# Patient Record
Sex: MALE | Race: WHITE | NOT HISPANIC OR LATINO | Employment: UNEMPLOYED | URBAN - METROPOLITAN AREA
[De-identification: names, ages, dates, MRNs, and addresses within clinical notes are randomized per-mention and may not be internally consistent; named-entity substitution may affect disease eponyms.]

---

## 2020-11-17 ENCOUNTER — HOSPITAL ENCOUNTER (OUTPATIENT)
Dept: RADIOLOGY | Facility: HOSPITAL | Age: 55
Discharge: HOME | End: 2020-11-17
Attending: FAMILY MEDICINE
Payer: MEDICAID

## 2020-11-17 ENCOUNTER — OFFICE VISIT (OUTPATIENT)
Dept: FAMILY MEDICINE | Facility: CLINIC | Age: 55
End: 2020-11-17
Payer: MEDICAID

## 2020-11-17 VITALS
SYSTOLIC BLOOD PRESSURE: 112 MMHG | HEIGHT: 71 IN | OXYGEN SATURATION: 97 % | WEIGHT: 315 LBS | HEART RATE: 80 BPM | BODY MASS INDEX: 44.1 KG/M2 | DIASTOLIC BLOOD PRESSURE: 70 MMHG | TEMPERATURE: 97 F

## 2020-11-17 DIAGNOSIS — M54.50 CHRONIC BILATERAL LOW BACK PAIN WITHOUT SCIATICA: Primary | ICD-10-CM

## 2020-11-17 DIAGNOSIS — Z72.0 CHEWS TOBACCO REGULARLY: ICD-10-CM

## 2020-11-17 DIAGNOSIS — G89.29 CHRONIC BILATERAL LOW BACK PAIN WITHOUT SCIATICA: Primary | ICD-10-CM

## 2020-11-17 DIAGNOSIS — M48.062 NEUROGENIC CLAUDICATION DUE TO LUMBAR SPINAL STENOSIS: ICD-10-CM

## 2020-11-17 DIAGNOSIS — Z12.11 COLON CANCER SCREENING: ICD-10-CM

## 2020-11-17 DIAGNOSIS — E66.01 MORBID OBESITY WITH BMI OF 50.0-59.9, ADULT: ICD-10-CM

## 2020-11-17 DIAGNOSIS — I10 ESSENTIAL HYPERTENSION: ICD-10-CM

## 2020-11-17 DIAGNOSIS — J32.9 CHRONIC CONGESTION OF PARANASAL SINUS: ICD-10-CM

## 2020-11-17 DIAGNOSIS — M48.07 SPINAL STENOSIS, LUMBOSACRAL REGION: ICD-10-CM

## 2020-11-17 DIAGNOSIS — M54.9 DORSALGIA, UNSPECIFIED: ICD-10-CM

## 2020-11-17 PROCEDURE — 99204 OFFICE O/P NEW MOD 45 MIN: CPT | Mod: PBBFAC,PO | Performed by: FAMILY MEDICINE

## 2020-11-17 PROCEDURE — 99999 PR PBB SHADOW E&M-NEW PATIENT-LVL IV: CPT | Mod: PBBFAC,,, | Performed by: FAMILY MEDICINE

## 2020-11-17 PROCEDURE — 72100 X-RAY EXAM L-S SPINE 2/3 VWS: CPT | Mod: TC,FY,PO

## 2020-11-17 PROCEDURE — 99999 PR PBB SHADOW E&M-NEW PATIENT-LVL IV: ICD-10-PCS | Mod: PBBFAC,,, | Performed by: FAMILY MEDICINE

## 2020-11-17 PROCEDURE — 72100 XR LUMBAR SPINE AP AND LATERAL: ICD-10-PCS | Mod: 26,,, | Performed by: RADIOLOGY

## 2020-11-17 PROCEDURE — 99204 PR OFFICE/OUTPT VISIT, NEW, LEVL IV, 45-59 MIN: ICD-10-PCS | Mod: S$PBB,,, | Performed by: FAMILY MEDICINE

## 2020-11-17 PROCEDURE — 72100 X-RAY EXAM L-S SPINE 2/3 VWS: CPT | Mod: 26,,, | Performed by: RADIOLOGY

## 2020-11-17 PROCEDURE — 99204 OFFICE O/P NEW MOD 45 MIN: CPT | Mod: S$PBB,,, | Performed by: FAMILY MEDICINE

## 2020-11-17 RX ORDER — LOSARTAN POTASSIUM AND HYDROCHLOROTHIAZIDE 12.5; 5 MG/1; MG/1
1 TABLET ORAL DAILY
COMMUNITY
End: 2021-05-19 | Stop reason: SDUPTHER

## 2020-11-17 RX ORDER — CARVEDILOL 25 MG/1
25 TABLET ORAL 2 TIMES DAILY
COMMUNITY
Start: 2020-09-10 | End: 2021-11-12 | Stop reason: SDUPTHER

## 2020-11-17 RX ORDER — BUPRENORPHINE HYDROCHLORIDE, NALOXONE HYDROCHLORIDE 12; 3 MG/1; MG/1
0.5 FILM, SOLUBLE BUCCAL; SUBLINGUAL 3 TIMES DAILY
COMMUNITY
Start: 2020-09-02

## 2020-11-17 NOTE — PROGRESS NOTES
Chief Complaint:    Chief Complaint   Patient presents with    [facility]       History of Present Illness:  Patient is new to the practice  He says he suffers with chronic back pain for a number years he describes the pain as in the low back and slight activity for example washing dishes or lawn mowing be backing or slight walking causes severe pain in the back and his legs get weak feels like he is about to fall down and gives out any strength in his legs.  Denies any tingling or numbness he describes the pain as does radiating to the legs specially with exertion.  He has not had any testing done other than just x-ray and has been sent for physical therapy at least few times which he says has not done him any good.  He says he is unable to work like this he that he wants his back fixed or he wants to go on disability.    He is also being treated for hypertension  Never had a colonoscopy      ROS:  Review of Systems   Constitutional: Negative for activity change, chills, fatigue, fever and unexpected weight change.   HENT: Positive for congestion. Negative for ear discharge, ear pain, hearing loss, postnasal drip and rhinorrhea.    Eyes: Negative for pain and visual disturbance.   Respiratory: Negative for cough, chest tightness and shortness of breath.    Cardiovascular: Negative for chest pain and palpitations.   Gastrointestinal: Negative for abdominal pain, diarrhea and vomiting.   Endocrine: Negative for heat intolerance.   Genitourinary: Negative for dysuria, flank pain, frequency and hematuria.   Musculoskeletal: Positive for back pain. Negative for gait problem and neck pain.   Skin: Negative for color change and rash.   Neurological: Negative for dizziness, tremors, seizures, numbness and headaches.   Psychiatric/Behavioral: Negative for agitation, hallucinations, self-injury, sleep disturbance and suicidal ideas. The patient is not nervous/anxious.        Past Medical History:   Diagnosis Date     "Hypertension        Social History:  Social History     Socioeconomic History    Marital status:      Spouse name: Not on file    Number of children: Not on file    Years of education: Not on file    Highest education level: Not on file   Occupational History    Not on file   Social Needs    Financial resource strain: Not on file    Food insecurity     Worry: Not on file     Inability: Not on file    Transportation needs     Medical: Not on file     Non-medical: Not on file   Tobacco Use    Smoking status: Never Smoker    Smokeless tobacco: Current User     Types: Snuff   Substance and Sexual Activity    Alcohol use: No    Drug use: No    Sexual activity: Not on file   Lifestyle    Physical activity     Days per week: Not on file     Minutes per session: Not on file    Stress: Not on file   Relationships    Social connections     Talks on phone: Not on file     Gets together: Not on file     Attends Judaism service: Not on file     Active member of club or organization: Not on file     Attends meetings of clubs or organizations: Not on file     Relationship status: Not on file   Other Topics Concern    Not on file   Social History Narrative    Not on file       Family History:   family history includes Anuerysm in his mother; Diabetes in his mother; Heart disease in his father.    Health Maintenance   Topic Date Due    Hepatitis C Screening  1965    Lipid Panel  1965    TETANUS VACCINE  04/13/1982       Physical Exam:    Vital Signs  Temp: 97.2 °F (36.2 °C)  Temp src: Temporal  Pulse: 80  SpO2: 97 %  BP: 112/70  BP Location: Left arm  Patient Position: Sitting  Pain Score:   3  Pain Loc: Back  Height and Weight  Height: 5' 11" (180.3 cm)  Weight: (!) 168.3 kg (371 lb 2.3 oz)  BSA (Calculated - sq m): 2.9 sq meters  BMI (Calculated): 51.8  Weight in (lb) to have BMI = 25: 178.9]    Body mass index is 51.76 kg/m².    Physical Exam  Constitutional:       Appearance: He is " well-developed.   HENT:      Right Ear: Tympanic membrane normal.      Left Ear: Tympanic membrane normal.   Eyes:      Conjunctiva/sclera: Conjunctivae normal.      Pupils: Pupils are equal, round, and reactive to light.   Neck:      Musculoskeletal: Normal range of motion and neck supple.   Cardiovascular:      Rate and Rhythm: Normal rate and regular rhythm.      Heart sounds: Normal heart sounds. No murmur.   Pulmonary:      Effort: Pulmonary effort is normal. No respiratory distress.      Breath sounds: Normal breath sounds. No wheezing or rales.   Chest:      Chest wall: No tenderness.   Abdominal:      General: There is no distension.      Palpations: Abdomen is soft. There is no mass.      Tenderness: There is no abdominal tenderness. There is no guarding.   Musculoskeletal:      Lumbar back: He exhibits tenderness.      Comments: Straight leg raising test is negative bilaterally  Bilateral lower extremity strength is intact and reflexes intact  There is pain on back flexion.  He is able to stand on his tiptoes   Lymphadenopathy:      Cervical: No cervical adenopathy.   Skin:     General: Skin is warm and dry.   Neurological:      Mental Status: He is alert and oriented to person, place, and time.      Deep Tendon Reflexes: Reflexes are normal and symmetric.   Psychiatric:         Behavior: Behavior normal.         Thought Content: Thought content normal.         Judgment: Judgment normal.           Assessment:      ICD-10-CM ICD-9-CM   1. Chronic bilateral low back pain without sciatica  M54.5 724.2    G89.29 338.29   2. Neurogenic claudication due to lumbar spinal stenosis  M48.062 724.03   3. Essential hypertension  I10 401.9   4. Morbid obesity with BMI of 50.0-59.9, adult  E66.01 278.01    Z68.43 V85.43   5. Chews tobacco regularly  Z72.0 305.1   6. Chronic congestion of paranasal sinus  J32.9 473.9   7. Colon cancer screening  Z12.11 V76.51   8. Spinal stenosis, lumbosacral region  M48.07 724.02   9.  Dorsalgia, unspecified  M54.9 724.5         Plan:        Appears to represent spinal stenosis will get x-ray of the lumbar spine and then possibly an MRI his insurance would authorize  He is due for screening colonoscopy will send in a request with the gastro department  Check labs as below  He has been using some over-the-counter nasal spray for congestion he will send me the name of the medication.  Follow-up in a month        Orders Placed This Encounter   Procedures    X-Ray Lumbar Spine AP And Lateral    MRI Lumbar Spine Without Contrast    CBC Auto Differential    Comprehensive Metabolic Panel    Hemoglobin A1C    Hepatitis C Antibody    HIV 1/2 Ag/Ab (4th Gen)    PSA, Screening       Current Outpatient Medications   Medication Sig Dispense Refill    carvediloL (COREG) 25 MG tablet Take 25 mg by mouth 2 (two) times a day.      losartan-hydrochlorothiazide 50-12.5 mg (HYZAAR) 50-12.5 mg per tablet Take 1 tablet by mouth once daily.      SUBOXONE 12-3 mg Film Place 0.5 Film under the tongue 3 (three) times daily.       No current facility-administered medications for this visit.        Medications Discontinued During This Encounter   Medication Reason    diclofenac (VOLTAREN) 50 MG EC tablet        Follow up for in 1 month.      Alisia Guardado MD

## 2020-11-27 ENCOUNTER — TELEPHONE (OUTPATIENT)
Dept: ENDOSCOPY | Facility: HOSPITAL | Age: 55
End: 2020-11-27

## 2020-11-27 ENCOUNTER — TELEPHONE (OUTPATIENT)
Dept: FAMILY MEDICINE | Facility: CLINIC | Age: 55
End: 2020-11-27

## 2020-11-27 ENCOUNTER — HOSPITAL ENCOUNTER (OUTPATIENT)
Dept: RADIOLOGY | Facility: HOSPITAL | Age: 55
Discharge: HOME | End: 2020-11-27
Attending: FAMILY MEDICINE
Payer: MEDICAID

## 2020-11-27 DIAGNOSIS — M54.9 DORSALGIA, UNSPECIFIED: ICD-10-CM

## 2020-11-27 PROCEDURE — 72148 MRI LUMBAR SPINE W/O DYE: CPT | Mod: 26,,, | Performed by: RADIOLOGY

## 2020-11-27 PROCEDURE — 72148 MRI LUMBAR SPINE W/O DYE: CPT | Mod: TC

## 2020-11-27 PROCEDURE — 72148 MRI LUMBAR SPINE WITHOUT CONTRAST: ICD-10-PCS | Mod: 26,,, | Performed by: RADIOLOGY

## 2020-11-27 NOTE — TELEPHONE ENCOUNTER
----- Message from Alisia Guardado MD sent at 11/18/2020 11:22 PM CST -----  A1c is high suggestive of prediabetes will discuss upon follow-up  Rest of the labs okay

## 2020-11-30 ENCOUNTER — TELEPHONE (OUTPATIENT)
Dept: FAMILY MEDICINE | Facility: CLINIC | Age: 55
End: 2020-11-30

## 2020-11-30 DIAGNOSIS — M48.062 NEUROGENIC CLAUDICATION DUE TO LUMBAR SPINAL STENOSIS: Primary | ICD-10-CM

## 2020-12-08 ENCOUNTER — HOSPITAL ENCOUNTER (EMERGENCY)
Facility: HOSPITAL | Age: 55
Discharge: HOME | End: 2020-12-08
Attending: EMERGENCY MEDICINE
Payer: MEDICAID

## 2020-12-08 VITALS
RESPIRATION RATE: 21 BRPM | HEART RATE: 82 BPM | WEIGHT: 315 LBS | TEMPERATURE: 99 F | SYSTOLIC BLOOD PRESSURE: 192 MMHG | BODY MASS INDEX: 52.55 KG/M2 | OXYGEN SATURATION: 96 % | DIASTOLIC BLOOD PRESSURE: 89 MMHG

## 2020-12-08 DIAGNOSIS — J31.0 RHINITIS MEDICAMENTOSA: Primary | ICD-10-CM

## 2020-12-08 DIAGNOSIS — R09.81 CHRONIC NASAL CONGESTION: ICD-10-CM

## 2020-12-08 DIAGNOSIS — Z87.898: ICD-10-CM

## 2020-12-08 DIAGNOSIS — T48.5X5A RHINITIS MEDICAMENTOSA: Primary | ICD-10-CM

## 2020-12-08 PROCEDURE — 99284 EMERGENCY DEPT VISIT MOD MDM: CPT

## 2020-12-08 RX ORDER — LORATADINE 10 MG/1
10 TABLET ORAL DAILY
Qty: 30 TABLET | Refills: 0 | Status: SHIPPED | OUTPATIENT
Start: 2020-12-08 | End: 2020-12-23 | Stop reason: ALTCHOICE

## 2020-12-08 RX ORDER — FLUTICASONE PROPIONATE 50 MCG
2 SPRAY, SUSPENSION (ML) NASAL DAILY
Qty: 15 G | Refills: 0 | Status: SHIPPED | OUTPATIENT
Start: 2020-12-08 | End: 2020-12-23 | Stop reason: ALTCHOICE

## 2020-12-08 NOTE — ED PROVIDER NOTES
"   History      Chief Complaint   Patient presents with    Facial Pain     +Sinus congestion x1 year. Pt states "I can't take it no more."        Review of patient's allergies indicates:  No Known Allergies     HPI   HPI    12/8/2020, 3:05 PM   History obtained from the patient      History of Present Illness: Robert Munoz is a 55 y.o. male patient who presents to the Emergency Department for nasal congestion for a year.  He admits to using afrin several times a day for year, did not know that short term use is recommended. Symptoms are moderate in severity.     No further complaints or concerns at this time.           PCP: Alisia Guardado MD       Past Medical History:  Past Medical History:   Diagnosis Date    Hypertension          Past Surgical History:  No past surgical history on file.        Family History:  Family History   Problem Relation Age of Onset    Diabetes Mother     Anuerysm Mother     Heart disease Father            Social History:  Social History     Tobacco Use    Smoking status: Never Smoker    Smokeless tobacco: Current User     Types: Snuff   Substance and Sexual Activity    Alcohol use: No    Drug use: No    Sexual activity: Not on file       ROS     Review of Systems   Constitutional: Negative for chills and fever.   HENT: Positive for congestion. Negative for facial swelling and trouble swallowing.    Eyes: Negative for pain and discharge.   Respiratory: Negative for chest tightness and shortness of breath.    Cardiovascular: Negative for palpitations and leg swelling.   Gastrointestinal: Negative for diarrhea and vomiting.   Endocrine: Negative for polydipsia and polyuria.   Genitourinary: Negative for decreased urine volume and flank pain.   Musculoskeletal: Negative for joint swelling and neck stiffness.   Skin: Negative for rash and wound.   Neurological: Negative for syncope and light-headedness.   All other systems reviewed and are negative.      Physical Exam      Initial " Vitals [12/08/20 0903]   BP Pulse Resp Temp SpO2   (!) 192/89 82 (!) 21 98.5 °F (36.9 °C) 96 %      MAP       --         Physical Exam  Vital signs and nursing notes reviewed.  Constitutional: Patient is in NAD. Awake and alert. Well-developed and well-nourished.  Head: Atraumatic. Normocephalic.  Eyes: PERRL. EOM intact. Conjunctivae nl. No scleral icterus.  ENT: Mucous membranes are moist. Oropharynx is clear. Nasal congestion.  Neck: Supple. No JVD. No lymphadenopathy.  No meningismus  Cardiovascular: Regular rate and rhythm. No murmurs, rubs, or gallops. Distal pulses are 2+ and symmetric.  Pulmonary/Chest: No respiratory distress. Clear to auscultation bilaterally. No wheezing, rales, or rhonchi.  Abdominal: Soft. Non-distended. No TTP. No rebound, guarding, or rigidity. Good bowel sounds.  Genitourinary: No CVA tenderness  Musculoskeletal: Moves all extremities. No edema.   Skin: Warm and dry.  Neurological: Awake and alert. No acute focal neurological deficits are appreciated.  Psychiatric: Normal affect. Good eye contact. Appropriate in content.      ED Course          Procedures  ED Vital Signs:  Vitals:    12/08/20 0903   BP: (!) 192/89   Pulse: 82   Resp: (!) 21   Temp: 98.5 °F (36.9 °C)   TempSrc: Oral   SpO2: 96%   Weight: (!) 170.9 kg (376 lb 12.3 oz)                 Imaging Results:  Imaging Results    None            The Emergency Provider reviewed the vital signs and test results, which are outlined above.    ED Discussion             Medication(s) given in the ER:  Medications - No data to display        Follow-up Information     Alisia Guardado MD In 2 days.    Specialty: Family Medicine  Contact information:  [address] [address] [address] [phone]                          Medication List      START taking these medications    fluticasone propionate 50 mcg/actuation nasal spray  Commonly known as: FLONASE  2 sprays (100 mcg total) by Each Nostril route once daily.     loratadine  10 mg tablet  Commonly known as: CLARITIN  Take 1 tablet (10 mg total) by mouth once daily.        ASK your doctor about these medications    carvediloL 25 MG tablet  Commonly known as: COREG     losartan-hydrochlorothiazide 50-12.5 mg 50-12.5 mg per tablet  Commonly known as: HYZAAR     SUBOXONE 12-3 mg Film  Generic drug: buprenorphine-naloxone           Where to Get Your Medications      You can get these medications from any pharmacy    Bring a paper prescription for each of these medications  · fluticasone propionate 50 mcg/actuation nasal spray  · loratadine 10 mg tablet             Medical Decision Making      Dc Afrin    All findings were reviewed with the patient/family in detail.   All remaining questions and concerns were addressed at that time.  Patient/family has been counseled regarding the need for follow-up as well as the indication to return to the emergency room should new or worrisome developments occur.        MDM               Clinical Impression:        ICD-10-CM ICD-9-CM   1. Rhinitis medicamentosa  J31.0 472.0    T48.5X5A    2. Chronic nasal congestion  R09.81 478.19   3. History of frequent nasal spray use  Z87.898 [id number]               Radha March PA-C  12/08/20 1508

## 2020-12-15 ENCOUNTER — TELEPHONE (OUTPATIENT)
Dept: ORTHOPEDICS | Facility: CLINIC | Age: 55
End: 2020-12-15

## 2020-12-23 ENCOUNTER — LAB VISIT (OUTPATIENT)
Dept: LAB | Facility: HOSPITAL | Age: 55
End: 2020-12-23
Attending: FAMILY MEDICINE
Payer: MEDICAID

## 2020-12-23 ENCOUNTER — OFFICE VISIT (OUTPATIENT)
Dept: FAMILY MEDICINE | Facility: CLINIC | Age: 55
End: 2020-12-23
Payer: MEDICAID

## 2020-12-23 ENCOUNTER — TELEPHONE (OUTPATIENT)
Dept: FAMILY MEDICINE | Facility: CLINIC | Age: 55
End: 2020-12-23

## 2020-12-23 VITALS
SYSTOLIC BLOOD PRESSURE: 118 MMHG | HEIGHT: 71 IN | TEMPERATURE: 99 F | WEIGHT: 315 LBS | OXYGEN SATURATION: 96 % | BODY MASS INDEX: 44.1 KG/M2 | HEART RATE: 89 BPM | DIASTOLIC BLOOD PRESSURE: 76 MMHG

## 2020-12-23 DIAGNOSIS — R09.81 CHRONIC NASAL CONGESTION: ICD-10-CM

## 2020-12-23 DIAGNOSIS — E66.01 MORBID OBESITY WITH BMI OF 50.0-59.9, ADULT: ICD-10-CM

## 2020-12-23 DIAGNOSIS — R73.03 PREDIABETES: ICD-10-CM

## 2020-12-23 DIAGNOSIS — R74.8 ELEVATED LIVER ENZYMES: ICD-10-CM

## 2020-12-23 DIAGNOSIS — D64.9 MILD ANEMIA: ICD-10-CM

## 2020-12-23 DIAGNOSIS — R73.03 PREDIABETES: Primary | ICD-10-CM

## 2020-12-23 LAB
ALBUMIN SERPL BCP-MCNC: 3.7 G/DL (ref 3.5–5.2)
ALP SERPL-CCNC: 95 U/L (ref 55–135)
ALT SERPL W/O P-5'-P-CCNC: 36 U/L (ref 10–44)
ANION GAP SERPL CALC-SCNC: 12 MMOL/L (ref 8–16)
AST SERPL-CCNC: 23 U/L (ref 10–40)
BASOPHILS # BLD AUTO: 0.07 K/UL (ref 0–0.2)
BASOPHILS NFR BLD: 0.5 % (ref 0–1.9)
BILIRUB SERPL-MCNC: 0.4 MG/DL (ref 0.1–1)
BUN SERPL-MCNC: 19 MG/DL (ref 6–20)
CALCIUM SERPL-MCNC: 9.2 MG/DL (ref 8.7–10.5)
CHLORIDE SERPL-SCNC: 101 MMOL/L (ref 95–110)
CHOLEST SERPL-MCNC: 125 MG/DL (ref 120–199)
CHOLEST/HDLC SERPL: 4 {RATIO} (ref 2–5)
CO2 SERPL-SCNC: 25 MMOL/L (ref 23–29)
CREAT SERPL-MCNC: 1 MG/DL (ref 0.5–1.4)
DIFFERENTIAL METHOD: ABNORMAL
EOSINOPHIL # BLD AUTO: 0.6 K/UL (ref 0–0.5)
EOSINOPHIL NFR BLD: 4.2 % (ref 0–8)
ERYTHROCYTE [DISTWIDTH] IN BLOOD BY AUTOMATED COUNT: 13.8 % (ref 11.5–14.5)
EST. GFR  (AFRICAN AMERICAN): >60 ML/MIN/1.73 M^2
EST. GFR  (NON AFRICAN AMERICAN): >60 ML/MIN/1.73 M^2
GLUCOSE SERPL-MCNC: 106 MG/DL (ref 70–110)
HCT VFR BLD AUTO: 39.3 % (ref 40–54)
HDLC SERPL-MCNC: 31 MG/DL (ref 40–75)
HDLC SERPL: 24.8 % (ref 20–50)
HGB BLD-MCNC: 12 G/DL (ref 14–18)
IMM GRANULOCYTES # BLD AUTO: 0.05 K/UL (ref 0–0.04)
IMM GRANULOCYTES NFR BLD AUTO: 0.4 % (ref 0–0.5)
LDLC SERPL CALC-MCNC: 64.4 MG/DL (ref 63–159)
LYMPHOCYTES # BLD AUTO: 2 K/UL (ref 1–4.8)
LYMPHOCYTES NFR BLD: 15.5 % (ref 18–48)
MCH RBC QN AUTO: 27.5 PG (ref 27–31)
MCHC RBC AUTO-ENTMCNC: 30.5 G/DL (ref 32–36)
MCV RBC AUTO: 90 FL (ref 82–98)
MONOCYTES # BLD AUTO: 0.9 K/UL (ref 0.3–1)
MONOCYTES NFR BLD: 6.8 % (ref 4–15)
NEUTROPHILS # BLD AUTO: 9.5 K/UL (ref 1.8–7.7)
NEUTROPHILS NFR BLD: 72.6 % (ref 38–73)
NONHDLC SERPL-MCNC: 94 MG/DL
NRBC BLD-RTO: 0 /100 WBC
PLATELET # BLD AUTO: 244 K/UL (ref 150–350)
PMV BLD AUTO: 11.9 FL (ref 9.2–12.9)
POTASSIUM SERPL-SCNC: 4.1 MMOL/L (ref 3.5–5.1)
PROT SERPL-MCNC: 7.8 G/DL (ref 6–8.4)
RBC # BLD AUTO: 4.37 M/UL (ref 4.6–6.2)
SODIUM SERPL-SCNC: 138 MMOL/L (ref 136–145)
TRIGL SERPL-MCNC: 148 MG/DL (ref 30–150)
TSH SERPL DL<=0.005 MIU/L-ACNC: 1.73 UIU/ML (ref 0.4–4)
WBC # BLD AUTO: 13.03 K/UL (ref 3.9–12.7)

## 2020-12-23 PROCEDURE — 99214 OFFICE O/P EST MOD 30 MIN: CPT | Mod: PBBFAC,PO | Performed by: FAMILY MEDICINE

## 2020-12-23 PROCEDURE — 85025 COMPLETE CBC W/AUTO DIFF WBC: CPT

## 2020-12-23 PROCEDURE — 99999 PR PBB SHADOW E&M-EST. PATIENT-LVL IV: ICD-10-PCS | Mod: PBBFAC,,, | Performed by: FAMILY MEDICINE

## 2020-12-23 PROCEDURE — 99999 PR PBB SHADOW E&M-EST. PATIENT-LVL IV: CPT | Mod: PBBFAC,,, | Performed by: FAMILY MEDICINE

## 2020-12-23 PROCEDURE — 99214 OFFICE O/P EST MOD 30 MIN: CPT | Mod: S$PBB,,, | Performed by: FAMILY MEDICINE

## 2020-12-23 PROCEDURE — 84443 ASSAY THYROID STIM HORMONE: CPT

## 2020-12-23 PROCEDURE — 80061 LIPID PANEL: CPT

## 2020-12-23 PROCEDURE — 80053 COMPREHEN METABOLIC PANEL: CPT

## 2020-12-23 PROCEDURE — 36415 COLL VENOUS BLD VENIPUNCTURE: CPT | Mod: PO

## 2020-12-23 PROCEDURE — 99214 PR OFFICE/OUTPT VISIT, EST, LEVL IV, 30-39 MIN: ICD-10-PCS | Mod: S$PBB,,, | Performed by: FAMILY MEDICINE

## 2020-12-23 NOTE — TELEPHONE ENCOUNTER
----- Message from Dickson Lopez LPN sent at 12/23/2020 11:12 AM CST -----  Regarding: RE: Appointment  Patient scheduled:) Thanks.     Thanks for choosing [facility],  Dept. Of Otorhinolaryngology  JIM Lopez LPN    ----- Message -----  From: Chantell Herndon MA  Sent: 12/23/2020   9:54 AM CST  To: [facility] Staff  Subject: Appointment                                      Please contact patient to schedule an appointment for Chronic nasal congestion

## 2020-12-23 NOTE — PROGRESS NOTES
Chief Complaint:    Chief Complaint   Patient presents with    Follow-up       History of Present Illness:  12/23/2020:-  He is here for follow-up he is waiting to get a nerve conduction study on his lower extremity  Labs reveal that he has prediabetic  Also revealed mild anemia and mildly elevated ALT denies alcohol use he is morbidly obese however.  He has a history abuse of decongestant nasally which he quit doing it but he is having difficulty with the use of Flonase and is complaining of constant congestion.    11/17/2020:-  Patient is new to the practice  He says he suffers with chronic back pain for a number years he describes the pain as in the low back and slight activity for example washing dishes or lawn mowing be backing or slight walking causes severe pain in the back and his legs get weak feels like he is about to fall down and gives out any strength in his legs.  Denies any tingling or numbness he describes the pain as does radiating to the legs specially with exertion.  He has not had any testing done other than just x-ray and has been sent for physical therapy at least few times which he says has not done him any good.  He says he is unable to work like this he that he wants his back fixed or he wants to go on disability.    He is also being treated for hypertension  Never had a colonoscopy      ROS:  Review of Systems   Constitutional: Negative for activity change, chills, fatigue, fever and unexpected weight change.   HENT: Positive for congestion. Negative for ear discharge, ear pain, hearing loss, postnasal drip and rhinorrhea.    Eyes: Negative for pain and visual disturbance.   Respiratory: Negative for cough, chest tightness and shortness of breath.    Cardiovascular: Negative for chest pain and palpitations.   Gastrointestinal: Negative for abdominal pain, diarrhea and vomiting.   Endocrine: Negative for heat intolerance.   Genitourinary: Negative for dysuria, flank pain, frequency and  hematuria.   Musculoskeletal: Positive for back pain. Negative for gait problem and neck pain.   Skin: Negative for color change and rash.   Neurological: Negative for dizziness, tremors, seizures, numbness and headaches.   Psychiatric/Behavioral: Negative for agitation, hallucinations, self-injury, sleep disturbance and suicidal ideas. The patient is not nervous/anxious.        Past Medical History:   Diagnosis Date    Hypertension        Social History:  Social History     Socioeconomic History    Marital status:      Spouse name: Not on file    Number of children: Not on file    Years of education: Not on file    Highest education level: Not on file   Occupational History    Not on file   Social Needs    Financial resource strain: Not on file    Food insecurity     Worry: Not on file     Inability: Not on file    Transportation needs     Medical: Not on file     Non-medical: Not on file   Tobacco Use    Smoking status: Never Smoker    Smokeless tobacco: Current User     Types: Snuff   Substance and Sexual Activity    Alcohol use: No    Drug use: No    Sexual activity: Not on file   Lifestyle    Physical activity     Days per week: Not on file     Minutes per session: Not on file    Stress: Not on file   Relationships    Social connections     Talks on phone: Not on file     Gets together: Not on file     Attends Gnosticist service: Not on file     Active member of club or organization: Not on file     Attends meetings of clubs or organizations: Not on file     Relationship status: Not on file   Other Topics Concern    Not on file   Social History Narrative    Not on file       Family History:   family history includes Anuerysm in his mother; Diabetes in his mother; Heart disease in his father.    Health Maintenance   Topic Date Due    TETANUS VACCINE  04/13/1982    Lipid Panel  09/17/2023    Hepatitis C Screening  Completed       Physical Exam:    Vital Signs  Temp: 98.6 °F (37  "°C)  Temp src: Temporal  Pulse: 89  SpO2: 96 %  BP: 118/76  BP Location: Left arm  Patient Position: Sitting  Pain Score:   4  Pain Loc: Back  Height and Weight  Height: 5' 11" (180.3 cm)  Weight: (!) 171.2 kg (377 lb 5.1 oz)  BSA (Calculated - sq m): 2.93 sq meters  BMI (Calculated): 52.6  Weight in (lb) to have BMI = 25: 178.9]    Body mass index is 52.63 kg/m².    Physical Exam  Constitutional:       Appearance: He is well-developed.   HENT:      Right Ear: Tympanic membrane normal.      Left Ear: Tympanic membrane normal.   Eyes:      Conjunctiva/sclera: Conjunctivae normal.      Pupils: Pupils are equal, round, and reactive to light.   Neck:      Musculoskeletal: Normal range of motion and neck supple.   Cardiovascular:      Rate and Rhythm: Normal rate and regular rhythm.      Heart sounds: Normal heart sounds. No murmur.   Pulmonary:      Effort: Pulmonary effort is normal. No respiratory distress.      Breath sounds: Normal breath sounds. No wheezing or rales.   Chest:      Chest wall: No tenderness.   Abdominal:      General: There is no distension.      Palpations: Abdomen is soft. There is no mass.      Tenderness: There is no abdominal tenderness. There is no guarding.   Musculoskeletal:      Lumbar back: He exhibits tenderness.      Comments: Straight leg raising test is negative bilaterally  Bilateral lower extremity strength is intact and reflexes intact  There is pain on back flexion.  He is able to stand on his tiptoes   Lymphadenopathy:      Cervical: No cervical adenopathy.   Skin:     General: Skin is warm and dry.   Neurological:      Mental Status: He is alert and oriented to person, place, and time.      Deep Tendon Reflexes: Reflexes are normal and symmetric.   Psychiatric:         Behavior: Behavior normal.         Thought Content: Thought content normal.         Judgment: Judgment normal.           Assessment:      ICD-10-CM ICD-9-CM   1. Prediabetes  R73.03 790.29   2. Mild anemia  D64.9 " 285.9   3. Elevated liver enzymes  R74.8 790.5   4. Morbid obesity with BMI of 50.0-59.9, adult  E66.01 278.01    Z68.43 V85.43   5. Chronic nasal congestion  R09.81 478.19         Plan:        This visit is  devoted to patient counseling and education.  Spent a about 20-25 minutes face time with the patient off which more than 50% of the time was spent counseling the patient and answering questions on this condition.  We discussed several things some of which are as under,    - Discussed implications of prediabetes, the risk of progression to diabetes type 2 and the complications from diabetes type 2.  -  Discussed weight loss in trying to get as close to a normal BMI of 25 or less.  This is one of the best proven strategy to decrease the risk of diabetes type 2.  Discuss adding exercise, combination of aerobic and weight training exercise.  -  Discussed elimination half junk food and simple carbohydrates.  -  Discussed placing the meals with complex carbohydrates with examples of complex carbohydrate explained to patient.  -  Discussed counting carbohydrates and measuring the carbohydrate.  Discussed portion control, plate sizes and the importance of frequent small meals.  -  Discussed the need for periodic monitoring of A1c preferably 6 months to one year.  Patient understood the discussion and agreed with the plan of action.  Follow up in 6 months with labs and a visit.    Will refer to ENT for chronic nasal congestion associated with abuse of his nasal decongestant long-term    Recheck a CBC and chemistry panel    Keep appointment for nerve conduction study      Orders Placed This Encounter   Procedures    Comprehensive Metabolic Panel    Lipid Panel    CBC Auto Differential    TSH    Ambulatory referral/consult to ENT       Current Outpatient Medications   Medication Sig Dispense Refill    carvediloL (COREG) 25 MG tablet Take 25 mg by mouth 2 (two) times a day.      losartan-hydrochlorothiazide 50-12.5  mg (HYZAAR) 50-12.5 mg per tablet Take 1 tablet by mouth once daily.      SUBOXONE 12-3 mg Film Place 0.5 Film under the tongue 3 (three) times daily.       No current facility-administered medications for this visit.        Medications Discontinued During This Encounter   Medication Reason    fluticasone propionate (FLONASE) 50 mcg/actuation nasal spray Therapy completed    loratadine (CLARITIN) 10 mg tablet Therapy completed       No follow-ups on file.      Alisia Guardado MD

## 2020-12-28 ENCOUNTER — PATIENT MESSAGE (OUTPATIENT)
Dept: ORTHOPEDICS | Facility: CLINIC | Age: 55
End: 2020-12-28

## 2020-12-28 ENCOUNTER — PATIENT OUTREACH (OUTPATIENT)
Dept: ADMINISTRATIVE | Facility: OTHER | Age: 55
End: 2020-12-28

## 2020-12-29 ENCOUNTER — OFFICE VISIT (OUTPATIENT)
Dept: ORTHOPEDICS | Facility: CLINIC | Age: 55
End: 2020-12-29
Payer: MEDICAID

## 2020-12-29 VITALS
HEIGHT: 71 IN | DIASTOLIC BLOOD PRESSURE: 82 MMHG | WEIGHT: 315 LBS | BODY MASS INDEX: 44.1 KG/M2 | SYSTOLIC BLOOD PRESSURE: 120 MMHG | HEART RATE: 79 BPM

## 2020-12-29 DIAGNOSIS — M54.41 CHRONIC RIGHT-SIDED LOW BACK PAIN WITH RIGHT-SIDED SCIATICA: Primary | ICD-10-CM

## 2020-12-29 DIAGNOSIS — G89.29 CHRONIC RIGHT-SIDED LOW BACK PAIN WITH RIGHT-SIDED SCIATICA: Primary | ICD-10-CM

## 2020-12-29 DIAGNOSIS — M48.062 NEUROGENIC CLAUDICATION DUE TO LUMBAR SPINAL STENOSIS: ICD-10-CM

## 2020-12-29 PROCEDURE — 95908 NRV CNDJ TST 3-4 STUDIES: CPT | Mod: 26,S$PBB,, | Performed by: PHYSICAL MEDICINE & REHABILITATION

## 2020-12-29 PROCEDURE — 99203 OFFICE O/P NEW LOW 30 MIN: CPT | Mod: 25,S$PBB,, | Performed by: PHYSICAL MEDICINE & REHABILITATION

## 2020-12-29 PROCEDURE — 95886 MUSC TEST DONE W/N TEST COMP: CPT | Mod: PBBFAC | Performed by: PHYSICAL MEDICINE & REHABILITATION

## 2020-12-29 PROCEDURE — 99203 PR OFFICE/OUTPT VISIT, NEW, LEVL III, 30-44 MIN: ICD-10-PCS | Mod: 25,S$PBB,, | Performed by: PHYSICAL MEDICINE & REHABILITATION

## 2020-12-29 PROCEDURE — 99999 PR PBB SHADOW E&M-EST. PATIENT-LVL III: ICD-10-PCS | Mod: PBBFAC,,, | Performed by: PHYSICAL MEDICINE & REHABILITATION

## 2020-12-29 PROCEDURE — 99999 PR PBB SHADOW E&M-EST. PATIENT-LVL III: CPT | Mod: PBBFAC,,, | Performed by: PHYSICAL MEDICINE & REHABILITATION

## 2020-12-29 PROCEDURE — 95886 PR EMG COMPLETE, W/ NERVE CONDUCTION STUDIES, 5+ MUSCLES: ICD-10-PCS | Mod: 26,S$PBB,RT, | Performed by: PHYSICAL MEDICINE & REHABILITATION

## 2020-12-29 PROCEDURE — 95908 NRV CNDJ TST 3-4 STUDIES: CPT | Mod: PBBFAC | Performed by: PHYSICAL MEDICINE & REHABILITATION

## 2020-12-29 PROCEDURE — 99213 OFFICE O/P EST LOW 20 MIN: CPT | Mod: PBBFAC,25 | Performed by: PHYSICAL MEDICINE & REHABILITATION

## 2020-12-29 PROCEDURE — 95886 MUSC TEST DONE W/N TEST COMP: CPT | Mod: 26,S$PBB,RT, | Performed by: PHYSICAL MEDICINE & REHABILITATION

## 2020-12-29 PROCEDURE — 95908 PR NERVE CONDUCTION STUDY; 3-4 STUDIES: ICD-10-PCS | Mod: 26,S$PBB,, | Performed by: PHYSICAL MEDICINE & REHABILITATION

## 2020-12-29 NOTE — LETTER
December 29, 2020      Alisia Guardado MD  [address] [address] [address]           [facility]  [address] [facility] [address]  Phone: [phone]  Fax: [phone]          Patient: Robert Munoz   MR Number: [id number]   YOB: 1965   Date of Visit: 12/29/2020       Dear Dr. Alisia Guardado:    Thank you for referring Robert Munoz to me for evaluation. Attached you will find relevant portions of my assessment and plan of care.    If you have questions, please do not hesitate to call me. I look forward to following Robert Munoz along with you.    Sincerely,    Pamela Yu MD    Enclosure  CC:  No Recipients    If you would like to receive this communication electronically, please contact [email or web address] or [phone] to request more information on [employer] access.    For providers and/or their staff who would like to refer a patient to [facility], please contact us through our one-stop-shop provider referral line, [facility], at [phone].    If you feel you have received this communication in error or would no longer like to receive these types of communications, please e-mail [email or web address]

## 2020-12-29 NOTE — PROGRESS NOTES
SPORTS MEDICINE / PM&R ELECTRODIAGNOSTIC HISTORY & PHYSICAL:    Full Name: Robert Munoz Gender: Male  Patient ID: [id number] YOB: 1965      Visit Date: 12/29/2020 13:02  Age: 55 Years 8 Months Old  Examining Physician: Pamela Yu M.D.  Referring Physician: Alisia Guardado MD  Height: 5 feet 11 inch  Reason for Referral: Lumbar spine pain    HPI: This is a 55 y.o.  male being seen in clinic today for evaluation of Pain of the Lower Back   He is seen as a consult from Dr. Alisia Guardado and they will receive these results electronically.     The problem began several years ago. He feels sharp, dull, throbbing, aching, sore, burning, stabbing, pressure, constant, pain at rest and pain with movement pain deep in his  his lower  back . The symptoms show no change. He has tried physical therapy, ice, heat, NSAIDS and rest without improvement. He has tried therapy with no relief. MRI showed several areas of foraminal stenosis.     History obtained from patient.    Past family, medical, social, surgical history, and vital signs reviewed in chart.    Review of Systems   Constitutional: Negative for chills, fever and weight loss.   HENT: Negative for hearing loss and sore throat.    Eyes: Negative for blurred vision, photophobia and pain.   Respiratory: Negative for shortness of breath.    Cardiovascular: Negative for chest pain.   Gastrointestinal: Negative for abdominal pain.   Genitourinary: Negative for dysuria.   Musculoskeletal: Positive for back pain and joint pain.   Skin: Negative for rash.   Neurological: Negative for tingling and headaches.   Endo/Heme/Allergies: Does not bruise/bleed easily.   Psychiatric/Behavioral: Negative for depression.       General    Nursing note and vitals reviewed.  Constitutional: He is oriented to person, place, and time. He appears well-developed and well-nourished.   HENT:   Head: Normocephalic and atraumatic.   Eyes: Conjunctivae and EOM are normal. Pupils are equal, round,  and reactive to light.   Neck: Neck supple.   Cardiovascular: Intact distal pulses.    Pulmonary/Chest: Effort normal. No respiratory distress.   Abdominal: He exhibits no distension.   Neurological: He is alert and oriented to person, place, and time. He has normal reflexes.   Psychiatric: He has a normal mood and affect.         Back (L-Spine & T-Spine) / Neck (C-Spine) Exam     Spinal Sensation   Right Side Sensation  L-Spine Level: decreasedRight lumbar spine sensation dermatome level: Ordered as decreased in all but the S1 dermatome.  Left Side Sensation  L-Spine Level: normal    Comments:  3+ bilateral lower extremity pitting edema      Muscle Strength   Right Lower Extremity   Hip Flexion: 5/5   Quadriceps:  5/5   Hamstrin/5   Anterior tibial:  5/5   Gastrocsoleus:  5/5   EHL:  5/5  Left Lower Extremity   Hip Flexion: 5/5   Quadriceps:  5/5   Hamstrin/5   Anterior tibial:  5/5   Gastrocsoleus:  5/5   EHL:  5/5    Reflexes     Left Side  Achilles:  2+  Quadriceps:  2+    Right Side   Achilles:  2+  Quadriceps:  2+        Findings:    SNC      Nerve / Sites Onset Lat Peak Lat Amp Segments Distance Velocity    ms ms µV  mm m/s   R Superficial peroneal - Ankle      1 NR NR NR 1 - G1 140 NR       MNC      Nerve / Sites Muscle Latency Amplitude Duration Rel Amp Segments Distance Lat Diff     ms mV ms %  mm ms   R Peroneal - EDB      Ankle EDB NR NR NR NR Ankle - EDB 80          Pop fossa - Ankle  NR   R Tibial - AH      Ankle AH 5.2 0.1 4.5 100 Ankle - AH 80        EMG         EMG Summary Table     Spontaneous MUAP Recruitment   Muscle IA Fib PSW Fasc Other Dur. Dur Amp Dur Polys Pattern Effort   R. Tibialis anterior N None None None . _NFT_ _NFT_ N N N N .   R. Gastrocnemius (Medial head) N None None None . _NFT_ _NFT_ N N N N .   R. Peroneus longus N None None None . _NFT_ _NFT_ N N N N .   R. Vastus lateralis N None None None . _NFT_ _NFT_ N N N N .   R. Biceps femoris (short head) N None None None .  _NFT_ _NFT_ N N N N .   R. Lumbar paraspinals (low) N None None None . _NFT_ _NFT_          Results:    - The right peroneal motor and sensory responses could not be obtained secondary to body habitus and edema.  - The right tibial motor responses was obtained and showed normal velocity and latency but very small amplitude. Again, this is almost certainly due to body habitus and significant edema.  - The right sural sensory study was not attempted.  - Given these limitations, studies were not attempted on the left leg.  - Needle EMG examination of the right lower extremity and lumbar paraspinals was normal.     Interpretation:    1. Today's study was limited by patient's large body habitus and pitting edema making it very difficult to obtain meaningful nerve conduction studies. Therefore peripheral nerve lesions and generalized neuropathy could have been missed.    2. The EMG examination was not limited by edema and produced meaningful results. No evidence of denervation was seen throughout the right lower extremity or lumbar paraspinals. This correlates to roughly a 93% chance that he does not have a right lumbar radiculopathy.       IMPRESSION/PLAN: Robert is a 55 y.o.  male with:    1. Neurogenic claudication due to lumbar spinal stenosis  - EMG W/ ULTRASOUND AND NERVE CONDUCTION TEST 2 Extremities       The findings were discussed with Robert in detail. It is still possible to have symptomatic spinal stenosis with normal EMG findings, but it is less likely he has an active radiculopathy. All of his questions were answered. He will follow-up with Dr. Alisia Guardado for further evaluation and management.    Disclaimer: This note was prepared using a voice recognition system and is likely to have sound alike errors within the text.     Pamela Yu M.D.  Sports Medicine / PM&R

## 2021-01-04 ENCOUNTER — PATIENT OUTREACH (OUTPATIENT)
Dept: ADMINISTRATIVE | Facility: HOSPITAL | Age: 56
End: 2021-01-04

## 2021-01-06 ENCOUNTER — TELEPHONE (OUTPATIENT)
Dept: FAMILY MEDICINE | Facility: CLINIC | Age: 56
End: 2021-01-06

## 2021-01-06 ENCOUNTER — TELEPHONE (OUTPATIENT)
Dept: INFECTIOUS DISEASES | Facility: HOSPITAL | Age: 56
End: 2021-01-06

## 2021-01-06 ENCOUNTER — PATIENT MESSAGE (OUTPATIENT)
Dept: INFECTIOUS DISEASES | Facility: HOSPITAL | Age: 56
End: 2021-01-06

## 2021-01-06 DIAGNOSIS — D64.9 MILD ANEMIA: Primary | ICD-10-CM

## 2021-01-29 ENCOUNTER — PATIENT MESSAGE (OUTPATIENT)
Dept: ADMINISTRATIVE | Facility: HOSPITAL | Age: 56
End: 2021-01-29

## 2021-02-03 ENCOUNTER — OFFICE VISIT (OUTPATIENT)
Dept: FAMILY MEDICINE | Facility: CLINIC | Age: 56
End: 2021-02-03
Payer: MEDICAID

## 2021-02-03 ENCOUNTER — HOSPITAL ENCOUNTER (OUTPATIENT)
Dept: RADIOLOGY | Facility: HOSPITAL | Age: 56
Discharge: HOME | End: 2021-02-03
Attending: FAMILY MEDICINE
Payer: MEDICARE

## 2021-02-03 ENCOUNTER — TELEPHONE (OUTPATIENT)
Dept: ORTHOPEDICS | Facility: CLINIC | Age: 56
End: 2021-02-03

## 2021-02-03 ENCOUNTER — HOSPITAL ENCOUNTER (OUTPATIENT)
Dept: RADIOLOGY | Facility: HOSPITAL | Age: 56
Discharge: HOME | End: 2021-02-03
Attending: FAMILY MEDICINE
Payer: MEDICAID

## 2021-02-03 VITALS
HEIGHT: 71 IN | HEART RATE: 102 BPM | SYSTOLIC BLOOD PRESSURE: 130 MMHG | TEMPERATURE: 98 F | WEIGHT: 315 LBS | DIASTOLIC BLOOD PRESSURE: 70 MMHG | OXYGEN SATURATION: 97 % | BODY MASS INDEX: 44.1 KG/M2

## 2021-02-03 DIAGNOSIS — M92.61 HAGLUND'S DEFORMITY OF RIGHT HEEL: Primary | ICD-10-CM

## 2021-02-03 DIAGNOSIS — M92.61 HAGLUND'S DEFORMITY OF RIGHT HEEL: ICD-10-CM

## 2021-02-03 DIAGNOSIS — M48.062 NEUROGENIC CLAUDICATION DUE TO LUMBAR SPINAL STENOSIS: ICD-10-CM

## 2021-02-03 PROCEDURE — 99214 OFFICE O/P EST MOD 30 MIN: CPT | Mod: S$GLB,,, | Performed by: FAMILY MEDICINE

## 2021-02-03 PROCEDURE — 73650 X-RAY EXAM OF HEEL: CPT | Mod: 26,RT,, | Performed by: RADIOLOGY

## 2021-02-03 PROCEDURE — 99214 OFFICE O/P EST MOD 30 MIN: CPT | Mod: PBBFAC,PO,25 | Performed by: FAMILY MEDICINE

## 2021-02-03 PROCEDURE — 99214 PR OFFICE/OUTPT VISIT, EST, LEVL IV, 30-39 MIN: ICD-10-PCS | Mod: S$GLB,,, | Performed by: FAMILY MEDICINE

## 2021-02-03 PROCEDURE — 99999 PR PBB SHADOW E&M-EST. PATIENT-LVL IV: CPT | Mod: PBBFAC,,, | Performed by: FAMILY MEDICINE

## 2021-02-03 PROCEDURE — 73650 X-RAY EXAM OF HEEL: CPT | Mod: TC,PO,RT

## 2021-02-03 PROCEDURE — 73650 XR CALCANEUS 2 VIEW RIGHT: ICD-10-PCS | Mod: 26,RT,, | Performed by: RADIOLOGY

## 2021-02-03 PROCEDURE — 99999 PR PBB SHADOW E&M-EST. PATIENT-LVL IV: ICD-10-PCS | Mod: PBBFAC,,, | Performed by: FAMILY MEDICINE

## 2021-02-04 ENCOUNTER — PATIENT OUTREACH (OUTPATIENT)
Dept: ADMINISTRATIVE | Facility: OTHER | Age: 56
End: 2021-02-04

## 2021-02-05 ENCOUNTER — TELEPHONE (OUTPATIENT)
Dept: NEUROSURGERY | Facility: CLINIC | Age: 56
End: 2021-02-05

## 2021-02-15 ENCOUNTER — TELEPHONE (OUTPATIENT)
Dept: NEUROSURGERY | Facility: CLINIC | Age: 56
End: 2021-02-15

## 2021-02-17 ENCOUNTER — TELEPHONE (OUTPATIENT)
Dept: NEUROSURGERY | Facility: CLINIC | Age: 56
End: 2021-02-17

## 2021-02-23 ENCOUNTER — TELEPHONE (OUTPATIENT)
Dept: NEUROSURGERY | Facility: CLINIC | Age: 56
End: 2021-02-23

## 2021-02-24 ENCOUNTER — OFFICE VISIT (OUTPATIENT)
Dept: NEUROSURGERY | Facility: CLINIC | Age: 56
End: 2021-02-24
Payer: MEDICAID

## 2021-02-24 VITALS
RESPIRATION RATE: 16 BRPM | BODY MASS INDEX: 44.1 KG/M2 | SYSTOLIC BLOOD PRESSURE: 170 MMHG | DIASTOLIC BLOOD PRESSURE: 90 MMHG | HEART RATE: 96 BPM | WEIGHT: 315 LBS | HEIGHT: 71 IN

## 2021-02-24 DIAGNOSIS — M51.36 DDD (DEGENERATIVE DISC DISEASE), LUMBAR: Primary | ICD-10-CM

## 2021-02-24 DIAGNOSIS — E66.01 MORBID OBESITY WITH BMI OF 50.0-59.9, ADULT: ICD-10-CM

## 2021-02-24 DIAGNOSIS — M47.816 FACET ARTHROPATHY, LUMBAR: ICD-10-CM

## 2021-02-24 DIAGNOSIS — M48.062 NEUROGENIC CLAUDICATION DUE TO LUMBAR SPINAL STENOSIS: ICD-10-CM

## 2021-02-24 PROCEDURE — 99999 PR PBB SHADOW E&M-EST. PATIENT-LVL III: CPT | Mod: PBBFAC,,, | Performed by: PHYSICIAN ASSISTANT

## 2021-02-24 PROCEDURE — 99999 PR PBB SHADOW E&M-EST. PATIENT-LVL III: ICD-10-PCS | Mod: PBBFAC,,, | Performed by: PHYSICIAN ASSISTANT

## 2021-02-24 PROCEDURE — 99213 OFFICE O/P EST LOW 20 MIN: CPT | Mod: PBBFAC | Performed by: PHYSICIAN ASSISTANT

## 2021-02-24 PROCEDURE — 99204 PR OFFICE/OUTPT VISIT, NEW, LEVL IV, 45-59 MIN: ICD-10-PCS | Mod: S$PBB,,, | Performed by: PHYSICIAN ASSISTANT

## 2021-02-24 PROCEDURE — 99204 OFFICE O/P NEW MOD 45 MIN: CPT | Mod: S$PBB,,, | Performed by: PHYSICIAN ASSISTANT

## 2021-02-25 ENCOUNTER — TELEPHONE (OUTPATIENT)
Dept: PAIN MEDICINE | Facility: CLINIC | Age: 56
End: 2021-02-25

## 2021-02-25 DIAGNOSIS — M47.816 LUMBAR SPONDYLOSIS: Primary | ICD-10-CM

## 2021-03-11 ENCOUNTER — TELEPHONE (OUTPATIENT)
Dept: PAIN MEDICINE | Facility: CLINIC | Age: 56
End: 2021-03-11

## 2021-03-12 ENCOUNTER — IMMUNIZATION (OUTPATIENT)
Dept: INTERNAL MEDICINE | Facility: CLINIC | Age: 56
End: 2021-03-12
Payer: MEDICAID

## 2021-03-12 DIAGNOSIS — Z23 NEED FOR VACCINATION: Primary | ICD-10-CM

## 2021-03-12 PROCEDURE — 91300 COVID-19, MRNA, LNP-S, PF, 30 MCG/0.3 ML DOSE VACCINE: CPT | Mod: PBBFAC | Performed by: FAMILY MEDICINE

## 2021-04-02 ENCOUNTER — IMMUNIZATION (OUTPATIENT)
Dept: INTERNAL MEDICINE | Facility: CLINIC | Age: 56
End: 2021-04-02
Payer: MEDICAID

## 2021-04-02 DIAGNOSIS — Z23 NEED FOR VACCINATION: Primary | ICD-10-CM

## 2021-04-02 PROCEDURE — 91300 COVID-19, MRNA, LNP-S, PF, 30 MCG/0.3 ML DOSE VACCINE: CPT | Mod: PBBFAC | Performed by: FAMILY MEDICINE

## 2021-04-02 PROCEDURE — 0002A COVID-19, MRNA, LNP-S, PF, 30 MCG/0.3 ML DOSE VACCINE: CPT | Mod: PBBFAC | Performed by: FAMILY MEDICINE

## 2021-04-17 ENCOUNTER — PATIENT MESSAGE (OUTPATIENT)
Dept: ENDOSCOPY | Facility: HOSPITAL | Age: 56
End: 2021-04-17

## 2021-05-06 ENCOUNTER — PATIENT MESSAGE (OUTPATIENT)
Dept: ENDOSCOPY | Facility: HOSPITAL | Age: 56
End: 2021-05-06

## 2021-05-06 DIAGNOSIS — Z01.818 PRE-OP TESTING: Primary | ICD-10-CM

## 2021-05-06 RX ORDER — SODIUM, POTASSIUM,MAG SULFATES 17.5-3.13G
1 SOLUTION, RECONSTITUTED, ORAL ORAL DAILY
Qty: 1 KIT | Refills: 0 | Status: SHIPPED | OUTPATIENT
Start: 2021-05-06 | End: 2021-05-08

## 2021-05-11 ENCOUNTER — TELEPHONE (OUTPATIENT)
Dept: FAMILY MEDICINE | Facility: CLINIC | Age: 56
End: 2021-05-11

## 2021-05-19 ENCOUNTER — OFFICE VISIT (OUTPATIENT)
Dept: FAMILY MEDICINE | Facility: CLINIC | Age: 56
End: 2021-05-19
Payer: MEDICAID

## 2021-05-19 VITALS
SYSTOLIC BLOOD PRESSURE: 130 MMHG | HEART RATE: 89 BPM | BODY MASS INDEX: 44.1 KG/M2 | HEIGHT: 71 IN | WEIGHT: 315 LBS | OXYGEN SATURATION: 98 % | DIASTOLIC BLOOD PRESSURE: 80 MMHG | TEMPERATURE: 97 F

## 2021-05-19 DIAGNOSIS — M92.61 HAGLUND'S DEFORMITY, RIGHT: Primary | ICD-10-CM

## 2021-05-19 PROCEDURE — 99213 PR OFFICE/OUTPT VISIT, EST, LEVL III, 20-29 MIN: ICD-10-PCS | Mod: S$PBB,,, | Performed by: FAMILY MEDICINE

## 2021-05-19 PROCEDURE — 99213 OFFICE O/P EST LOW 20 MIN: CPT | Mod: S$PBB,,, | Performed by: FAMILY MEDICINE

## 2021-05-19 PROCEDURE — 99214 OFFICE O/P EST MOD 30 MIN: CPT | Mod: PBBFAC,PO | Performed by: FAMILY MEDICINE

## 2021-05-19 PROCEDURE — 99999 PR PBB SHADOW E&M-EST. PATIENT-LVL IV: CPT | Mod: PBBFAC,,, | Performed by: FAMILY MEDICINE

## 2021-05-19 PROCEDURE — 99999 PR PBB SHADOW E&M-EST. PATIENT-LVL IV: ICD-10-PCS | Mod: PBBFAC,,, | Performed by: FAMILY MEDICINE

## 2021-05-19 RX ORDER — LOSARTAN POTASSIUM AND HYDROCHLOROTHIAZIDE 25; 100 MG/1; MG/1
1 TABLET ORAL DAILY
COMMUNITY
Start: 2021-05-17 | End: 2021-11-12 | Stop reason: SDUPTHER

## 2021-06-08 DIAGNOSIS — R09.81 CHRONIC NASAL CONGESTION: Primary | ICD-10-CM

## 2021-06-08 RX ORDER — FLUTICASONE PROPIONATE 50 MCG
1 SPRAY, SUSPENSION (ML) NASAL DAILY
Qty: 16 G | Refills: 0 | Status: SHIPPED | OUTPATIENT
Start: 2021-06-08 | End: 2022-04-19 | Stop reason: SDUPTHER

## 2021-06-10 ENCOUNTER — TELEPHONE (OUTPATIENT)
Dept: PAIN MEDICINE | Facility: CLINIC | Age: 56
End: 2021-06-10

## 2021-06-10 ENCOUNTER — TELEPHONE (OUTPATIENT)
Dept: FAMILY MEDICINE | Facility: CLINIC | Age: 56
End: 2021-06-10

## 2021-06-11 ENCOUNTER — TELEPHONE (OUTPATIENT)
Dept: FAMILY MEDICINE | Facility: CLINIC | Age: 56
End: 2021-06-11

## 2021-06-11 ENCOUNTER — TELEPHONE (OUTPATIENT)
Dept: PAIN MEDICINE | Facility: CLINIC | Age: 56
End: 2021-06-11

## 2021-06-11 DIAGNOSIS — M54.50 CHRONIC BILATERAL LOW BACK PAIN WITHOUT SCIATICA: Primary | ICD-10-CM

## 2021-06-11 DIAGNOSIS — G89.29 CHRONIC BILATERAL LOW BACK PAIN WITHOUT SCIATICA: Primary | ICD-10-CM

## 2021-06-14 ENCOUNTER — TELEPHONE (OUTPATIENT)
Dept: ENDOSCOPY | Facility: HOSPITAL | Age: 56
End: 2021-06-14

## 2021-06-14 ENCOUNTER — TELEPHONE (OUTPATIENT)
Dept: GASTROENTEROLOGY | Facility: CLINIC | Age: 56
End: 2021-06-14

## 2021-06-25 ENCOUNTER — TELEPHONE (OUTPATIENT)
Dept: GASTROENTEROLOGY | Facility: CLINIC | Age: 56
End: 2021-06-25

## 2021-06-25 ENCOUNTER — TELEPHONE (OUTPATIENT)
Dept: ENDOSCOPY | Facility: HOSPITAL | Age: 56
End: 2021-06-25

## 2021-06-25 DIAGNOSIS — Z12.11 COLON CANCER SCREENING: Primary | ICD-10-CM

## 2021-06-28 ENCOUNTER — PATIENT MESSAGE (OUTPATIENT)
Dept: FAMILY MEDICINE | Facility: CLINIC | Age: 56
End: 2021-06-28

## 2021-06-29 ENCOUNTER — PATIENT MESSAGE (OUTPATIENT)
Dept: ENDOSCOPY | Facility: HOSPITAL | Age: 56
End: 2021-06-29

## 2021-07-12 ENCOUNTER — PATIENT MESSAGE (OUTPATIENT)
Dept: ENDOSCOPY | Facility: HOSPITAL | Age: 56
End: 2021-07-12

## 2021-07-14 ENCOUNTER — PATIENT OUTREACH (OUTPATIENT)
Dept: ADMINISTRATIVE | Facility: OTHER | Age: 56
End: 2021-07-14

## 2021-07-15 ENCOUNTER — OFFICE VISIT (OUTPATIENT)
Dept: PODIATRY | Facility: CLINIC | Age: 56
End: 2021-07-15
Payer: MEDICAID

## 2021-07-15 VITALS — HEIGHT: 71 IN | BODY MASS INDEX: 44.1 KG/M2 | WEIGHT: 315 LBS

## 2021-07-15 DIAGNOSIS — M76.61 ACHILLES TENDINITIS OF RIGHT LOWER EXTREMITY: Primary | ICD-10-CM

## 2021-07-15 DIAGNOSIS — M92.61 HAGLUND'S DEFORMITY, RIGHT: ICD-10-CM

## 2021-07-15 DIAGNOSIS — E66.01 MORBID OBESITY: ICD-10-CM

## 2021-07-15 PROCEDURE — 99204 PR OFFICE/OUTPT VISIT, NEW, LEVL IV, 45-59 MIN: ICD-10-PCS | Mod: S$PBB,,, | Performed by: PODIATRIST

## 2021-07-15 PROCEDURE — 99204 OFFICE O/P NEW MOD 45 MIN: CPT | Mod: S$PBB,,, | Performed by: PODIATRIST

## 2021-07-15 PROCEDURE — 99213 OFFICE O/P EST LOW 20 MIN: CPT | Mod: PBBFAC | Performed by: PODIATRIST

## 2021-07-15 PROCEDURE — 99999 PR PBB SHADOW E&M-EST. PATIENT-LVL III: ICD-10-PCS | Mod: PBBFAC,,, | Performed by: PODIATRIST

## 2021-07-15 PROCEDURE — 99999 PR PBB SHADOW E&M-EST. PATIENT-LVL III: CPT | Mod: PBBFAC,,, | Performed by: PODIATRIST

## 2021-07-29 ENCOUNTER — CLINICAL SUPPORT (OUTPATIENT)
Dept: REHABILITATION | Facility: HOSPITAL | Age: 56
End: 2021-07-29
Payer: MEDICAID

## 2021-07-29 DIAGNOSIS — G89.29 CHRONIC BILATERAL LOW BACK PAIN WITH LEFT-SIDED SCIATICA: ICD-10-CM

## 2021-07-29 DIAGNOSIS — R26.9 GAIT DIFFICULTY: ICD-10-CM

## 2021-07-29 DIAGNOSIS — M79.671 RIGHT FOOT PAIN: ICD-10-CM

## 2021-07-29 DIAGNOSIS — M54.42 CHRONIC BILATERAL LOW BACK PAIN WITH LEFT-SIDED SCIATICA: ICD-10-CM

## 2021-07-29 PROCEDURE — 97162 PT EVAL MOD COMPLEX 30 MIN: CPT

## 2021-07-29 PROCEDURE — 97110 THERAPEUTIC EXERCISES: CPT

## 2021-07-30 PROBLEM — G89.29 CHRONIC BILATERAL LOW BACK PAIN WITH LEFT-SIDED SCIATICA: Status: ACTIVE | Noted: 2021-07-30

## 2021-07-30 PROBLEM — M54.42 CHRONIC BILATERAL LOW BACK PAIN WITH LEFT-SIDED SCIATICA: Status: ACTIVE | Noted: 2021-07-30

## 2021-08-13 ENCOUNTER — TELEPHONE (OUTPATIENT)
Dept: REHABILITATION | Facility: HOSPITAL | Age: 56
End: 2021-08-13

## 2021-08-16 ENCOUNTER — TELEPHONE (OUTPATIENT)
Dept: PAIN MEDICINE | Facility: CLINIC | Age: 56
End: 2021-08-16

## 2021-08-16 ENCOUNTER — PATIENT MESSAGE (OUTPATIENT)
Dept: PAIN MEDICINE | Facility: CLINIC | Age: 56
End: 2021-08-16

## 2021-08-17 ENCOUNTER — TELEPHONE (OUTPATIENT)
Dept: PAIN MEDICINE | Facility: CLINIC | Age: 56
End: 2021-08-17

## 2021-08-17 ENCOUNTER — PATIENT MESSAGE (OUTPATIENT)
Dept: PAIN MEDICINE | Facility: CLINIC | Age: 56
End: 2021-08-17

## 2021-08-17 ENCOUNTER — PATIENT OUTREACH (OUTPATIENT)
Dept: ADMINISTRATIVE | Facility: OTHER | Age: 56
End: 2021-08-17

## 2021-08-17 ENCOUNTER — DOCUMENTATION ONLY (OUTPATIENT)
Dept: REHABILITATION | Facility: HOSPITAL | Age: 56
End: 2021-08-17

## 2021-08-17 PROBLEM — R26.9 GAIT DIFFICULTY: Status: RESOLVED | Noted: 2021-07-29 | Resolved: 2021-08-17

## 2021-08-17 PROBLEM — G89.29 CHRONIC BILATERAL LOW BACK PAIN WITH LEFT-SIDED SCIATICA: Status: RESOLVED | Noted: 2021-07-30 | Resolved: 2021-08-17

## 2021-08-17 PROBLEM — M54.42 CHRONIC BILATERAL LOW BACK PAIN WITH LEFT-SIDED SCIATICA: Status: RESOLVED | Noted: 2021-07-30 | Resolved: 2021-08-17

## 2021-08-17 PROBLEM — M79.671 RIGHT FOOT PAIN: Status: RESOLVED | Noted: 2021-07-29 | Resolved: 2021-08-17

## 2021-08-19 ENCOUNTER — OFFICE VISIT (OUTPATIENT)
Dept: PAIN MEDICINE | Facility: CLINIC | Age: 56
End: 2021-08-19
Payer: MEDICAID

## 2021-08-19 VITALS
HEIGHT: 71 IN | WEIGHT: 315 LBS | SYSTOLIC BLOOD PRESSURE: 148 MMHG | BODY MASS INDEX: 44.1 KG/M2 | HEART RATE: 88 BPM | DIASTOLIC BLOOD PRESSURE: 88 MMHG

## 2021-08-19 DIAGNOSIS — E66.01 MORBID OBESITY WITH BMI OF 50.0-59.9, ADULT: ICD-10-CM

## 2021-08-19 DIAGNOSIS — G89.29 CHRONIC BILATERAL LOW BACK PAIN WITHOUT SCIATICA: ICD-10-CM

## 2021-08-19 DIAGNOSIS — M48.061 SPINAL STENOSIS OF LUMBAR REGION WITHOUT NEUROGENIC CLAUDICATION: ICD-10-CM

## 2021-08-19 DIAGNOSIS — M47.816 LUMBAR SPONDYLOSIS: ICD-10-CM

## 2021-08-19 DIAGNOSIS — M54.50 CHRONIC BILATERAL LOW BACK PAIN WITHOUT SCIATICA: ICD-10-CM

## 2021-08-19 DIAGNOSIS — M51.36 DDD (DEGENERATIVE DISC DISEASE), LUMBAR: Primary | ICD-10-CM

## 2021-08-19 PROCEDURE — 99999 PR PBB SHADOW E&M-EST. PATIENT-LVL III: ICD-10-PCS | Mod: PBBFAC,,, | Performed by: PHYSICAL MEDICINE & REHABILITATION

## 2021-08-19 PROCEDURE — 99204 OFFICE O/P NEW MOD 45 MIN: CPT | Mod: S$PBB,,, | Performed by: PHYSICAL MEDICINE & REHABILITATION

## 2021-08-19 PROCEDURE — 99213 OFFICE O/P EST LOW 20 MIN: CPT | Mod: PBBFAC | Performed by: PHYSICAL MEDICINE & REHABILITATION

## 2021-08-19 PROCEDURE — 99999 PR PBB SHADOW E&M-EST. PATIENT-LVL III: CPT | Mod: PBBFAC,,, | Performed by: PHYSICAL MEDICINE & REHABILITATION

## 2021-08-19 PROCEDURE — 99204 PR OFFICE/OUTPT VISIT, NEW, LEVL IV, 45-59 MIN: ICD-10-PCS | Mod: S$PBB,,, | Performed by: PHYSICAL MEDICINE & REHABILITATION

## 2021-08-19 RX ORDER — GABAPENTIN 300 MG/1
CAPSULE ORAL
Qty: 90 CAPSULE | Refills: 11 | Status: SHIPPED | OUTPATIENT
Start: 2021-08-19 | End: 2021-10-04

## 2021-09-20 ENCOUNTER — TELEPHONE (OUTPATIENT)
Dept: ENDOSCOPY | Facility: HOSPITAL | Age: 56
End: 2021-09-20

## 2021-09-20 DIAGNOSIS — Z12.11 ENCOUNTER FOR SCREENING COLONOSCOPY: Primary | ICD-10-CM

## 2021-09-29 ENCOUNTER — IMMUNIZATION (OUTPATIENT)
Dept: PRIMARY CARE CLINIC | Facility: CLINIC | Age: 56
End: 2021-09-29
Payer: MEDICAID

## 2021-09-29 DIAGNOSIS — Z23 NEED FOR VACCINATION: Primary | ICD-10-CM

## 2021-09-29 PROCEDURE — 91300 COVID-19, MRNA, LNP-S, PF, 30 MCG/0.3 ML DOSE VACCINE: CPT | Mod: PBBFAC | Performed by: FAMILY MEDICINE

## 2021-09-29 PROCEDURE — 0003A COVID-19, MRNA, LNP-S, PF, 30 MCG/0.3 ML DOSE VACCINE: CPT | Mod: CV19,PBBFAC | Performed by: FAMILY MEDICINE

## 2021-10-04 ENCOUNTER — OFFICE VISIT (OUTPATIENT)
Dept: FAMILY MEDICINE | Facility: CLINIC | Age: 56
End: 2021-10-04
Payer: MEDICAID

## 2021-10-04 VITALS
OXYGEN SATURATION: 98 % | HEIGHT: 71 IN | BODY MASS INDEX: 44.1 KG/M2 | HEART RATE: 88 BPM | WEIGHT: 315 LBS | TEMPERATURE: 97 F

## 2021-10-04 DIAGNOSIS — G89.29 CHRONIC BILATERAL LOW BACK PAIN WITHOUT SCIATICA: ICD-10-CM

## 2021-10-04 DIAGNOSIS — I10 HTN, GOAL BELOW 140/90: ICD-10-CM

## 2021-10-04 DIAGNOSIS — M92.61 HAGLUND'S DEFORMITY, RIGHT: ICD-10-CM

## 2021-10-04 DIAGNOSIS — L72.3 SEBACEOUS CYST: Primary | ICD-10-CM

## 2021-10-04 DIAGNOSIS — M54.50 CHRONIC BILATERAL LOW BACK PAIN WITHOUT SCIATICA: ICD-10-CM

## 2021-10-04 DIAGNOSIS — E66.01 MORBID OBESITY WITH BMI OF 50.0-59.9, ADULT: ICD-10-CM

## 2021-10-04 PROCEDURE — 90750 HZV VACC RECOMBINANT IM: CPT | Mod: PBBFAC,PO

## 2021-10-04 PROCEDURE — 99999 PR PBB SHADOW E&M-EST. PATIENT-LVL IV: CPT | Mod: PBBFAC,,, | Performed by: FAMILY MEDICINE

## 2021-10-04 PROCEDURE — 90472 IMMUNIZATION ADMIN EACH ADD: CPT | Mod: PBBFAC,PO

## 2021-10-04 PROCEDURE — 99999 PR PBB SHADOW E&M-EST. PATIENT-LVL IV: ICD-10-PCS | Mod: PBBFAC,,, | Performed by: FAMILY MEDICINE

## 2021-10-04 PROCEDURE — 99214 OFFICE O/P EST MOD 30 MIN: CPT | Mod: PBBFAC,PO | Performed by: FAMILY MEDICINE

## 2021-10-04 PROCEDURE — 99214 PR OFFICE/OUTPT VISIT, EST, LEVL IV, 30-39 MIN: ICD-10-PCS | Mod: S$PBB,,, | Performed by: FAMILY MEDICINE

## 2021-10-04 PROCEDURE — 99214 OFFICE O/P EST MOD 30 MIN: CPT | Mod: S$PBB,,, | Performed by: FAMILY MEDICINE

## 2021-10-04 PROCEDURE — 90471 IMMUNIZATION ADMIN: CPT | Mod: PBBFAC,PO

## 2021-10-04 RX ORDER — NIFEDIPINE 30 MG/1
30 TABLET, EXTENDED RELEASE ORAL NIGHTLY
Qty: 30 TABLET | Refills: 11 | Status: SHIPPED | OUTPATIENT
Start: 2021-10-04 | End: 2022-10-13 | Stop reason: SDUPTHER

## 2021-10-11 ENCOUNTER — LAB VISIT (OUTPATIENT)
Dept: PRIMARY CARE CLINIC | Facility: CLINIC | Age: 56
End: 2021-10-11
Payer: MEDICAID

## 2021-10-11 DIAGNOSIS — Z12.11 ENCOUNTER FOR SCREENING COLONOSCOPY: ICD-10-CM

## 2021-10-11 PROCEDURE — U0005 INFEC AGEN DETEC AMPLI PROBE: HCPCS | Performed by: INTERNAL MEDICINE

## 2021-10-11 PROCEDURE — U0003 INFECTIOUS AGENT DETECTION BY NUCLEIC ACID (DNA OR RNA); SEVERE ACUTE RESPIRATORY SYNDROME CORONAVIRUS 2 (SARS-COV-2) (CORONAVIRUS DISEASE [COVID-19]), AMPLIFIED PROBE TECHNIQUE, MAKING USE OF HIGH THROUGHPUT TECHNOLOGIES AS DESCRIBED BY CMS-2020-01-R: HCPCS | Performed by: INTERNAL MEDICINE

## 2021-10-12 PROBLEM — Z12.11 ENCOUNTER FOR SCREENING COLONOSCOPY: Status: ACTIVE | Noted: 2021-10-12

## 2021-10-12 LAB
SARS-COV-2 RNA RESP QL NAA+PROBE: NOT DETECTED
SARS-COV-2- CYCLE NUMBER: NORMAL

## 2021-10-13 ENCOUNTER — ANESTHESIA EVENT (OUTPATIENT)
Dept: ENDOSCOPY | Facility: HOSPITAL | Age: 56
End: 2021-10-13
Payer: MEDICAID

## 2021-10-13 ENCOUNTER — ANESTHESIA (OUTPATIENT)
Dept: ENDOSCOPY | Facility: HOSPITAL | Age: 56
End: 2021-10-13
Payer: MEDICAID

## 2021-10-13 ENCOUNTER — HOSPITAL ENCOUNTER (OUTPATIENT)
Facility: HOSPITAL | Age: 56
Discharge: HOME | End: 2021-10-13
Attending: INTERNAL MEDICINE | Admitting: INTERNAL MEDICINE
Payer: MEDICAID

## 2021-10-13 VITALS
SYSTOLIC BLOOD PRESSURE: 130 MMHG | HEIGHT: 71 IN | RESPIRATION RATE: 17 BRPM | BODY MASS INDEX: 51.56 KG/M2 | DIASTOLIC BLOOD PRESSURE: 78 MMHG | HEART RATE: 81 BPM | OXYGEN SATURATION: 95 % | TEMPERATURE: 98 F

## 2021-10-13 DIAGNOSIS — Z12.11 ENCOUNTER FOR SCREENING COLONOSCOPY: Primary | ICD-10-CM

## 2021-10-13 PROCEDURE — 37000009 HC ANESTHESIA EA ADD 15 MINS: Performed by: INTERNAL MEDICINE

## 2021-10-13 PROCEDURE — G0121 COLON CA SCRN NOT HI RSK IND: HCPCS | Performed by: INTERNAL MEDICINE

## 2021-10-13 PROCEDURE — 45378 DIAGNOSTIC COLONOSCOPY: CPT | Mod: ,,, | Performed by: INTERNAL MEDICINE

## 2021-10-13 PROCEDURE — 45378 PR COLONOSCOPY,DIAGNOSTIC: ICD-10-PCS | Mod: ,,, | Performed by: INTERNAL MEDICINE

## 2021-10-13 PROCEDURE — 25000003 PHARM REV CODE 250: Performed by: NURSE ANESTHETIST, CERTIFIED REGISTERED

## 2021-10-13 PROCEDURE — 37000008 HC ANESTHESIA 1ST 15 MINUTES: Performed by: INTERNAL MEDICINE

## 2021-10-13 PROCEDURE — 00812 ANES LWR INTST SCR COLSC: CPT | Performed by: INTERNAL MEDICINE

## 2021-10-13 PROCEDURE — 45378 DIAGNOSTIC COLONOSCOPY: CPT | Performed by: INTERNAL MEDICINE

## 2021-10-13 PROCEDURE — 63600175 PHARM REV CODE 636 W HCPCS: Performed by: NURSE ANESTHETIST, CERTIFIED REGISTERED

## 2021-10-13 RX ORDER — SODIUM CHLORIDE, SODIUM LACTATE, POTASSIUM CHLORIDE, CALCIUM CHLORIDE 600; 310; 30; 20 MG/100ML; MG/100ML; MG/100ML; MG/100ML
INJECTION, SOLUTION INTRAVENOUS CONTINUOUS PRN
Status: DISCONTINUED | OUTPATIENT
Start: 2021-10-13 | End: 2021-10-13

## 2021-10-13 RX ORDER — SODIUM CHLORIDE, SODIUM LACTATE, POTASSIUM CHLORIDE, CALCIUM CHLORIDE 600; 310; 30; 20 MG/100ML; MG/100ML; MG/100ML; MG/100ML
INJECTION, SOLUTION INTRAVENOUS CONTINUOUS
Status: DISCONTINUED | OUTPATIENT
Start: 2021-10-13 | End: 2021-10-13 | Stop reason: HOSPADM

## 2021-10-13 RX ORDER — PROPOFOL 10 MG/ML
VIAL (ML) INTRAVENOUS
Status: DISCONTINUED | OUTPATIENT
Start: 2021-10-13 | End: 2021-10-13

## 2021-10-13 RX ORDER — LIDOCAINE HYDROCHLORIDE 10 MG/ML
INJECTION, SOLUTION EPIDURAL; INFILTRATION; INTRACAUDAL; PERINEURAL
Status: DISCONTINUED | OUTPATIENT
Start: 2021-10-13 | End: 2021-10-13

## 2021-10-13 RX ORDER — SODIUM CHLORIDE 0.9 % (FLUSH) 0.9 %
10 SYRINGE (ML) INJECTION
Status: DISCONTINUED | OUTPATIENT
Start: 2021-10-13 | End: 2021-10-13 | Stop reason: HOSPADM

## 2021-10-13 RX ADMIN — PROPOFOL 50 MG: 10 INJECTION, EMULSION INTRAVENOUS at 08:10

## 2021-10-13 RX ADMIN — LIDOCAINE HYDROCHLORIDE 50 MG: 10 INJECTION, SOLUTION EPIDURAL; INFILTRATION; INTRACAUDAL; PERINEURAL at 08:10

## 2021-10-13 RX ADMIN — SODIUM CHLORIDE, SODIUM LACTATE, POTASSIUM CHLORIDE, AND CALCIUM CHLORIDE: 600; 310; 30; 20 INJECTION, SOLUTION INTRAVENOUS at 07:10

## 2021-10-13 RX ADMIN — PROPOFOL 150 MG: 10 INJECTION, EMULSION INTRAVENOUS at 08:10

## 2021-10-18 ENCOUNTER — OFFICE VISIT (OUTPATIENT)
Dept: FAMILY MEDICINE | Facility: CLINIC | Age: 56
End: 2021-10-18
Payer: MEDICAID

## 2021-10-18 VITALS
HEIGHT: 71 IN | WEIGHT: 315 LBS | BODY MASS INDEX: 44.1 KG/M2 | HEART RATE: 92 BPM | DIASTOLIC BLOOD PRESSURE: 72 MMHG | SYSTOLIC BLOOD PRESSURE: 124 MMHG | OXYGEN SATURATION: 96 % | TEMPERATURE: 98 F

## 2021-10-18 DIAGNOSIS — D64.9 MILD ANEMIA: ICD-10-CM

## 2021-10-18 DIAGNOSIS — E66.01 MORBID OBESITY WITH BMI OF 50.0-59.9, ADULT: ICD-10-CM

## 2021-10-18 DIAGNOSIS — I10 PRIMARY HYPERTENSION: Primary | ICD-10-CM

## 2021-10-18 PROCEDURE — 99213 OFFICE O/P EST LOW 20 MIN: CPT | Mod: PBBFAC,PO | Performed by: FAMILY MEDICINE

## 2021-10-18 PROCEDURE — 99214 PR OFFICE/OUTPT VISIT, EST, LEVL IV, 30-39 MIN: ICD-10-PCS | Mod: S$PBB,,, | Performed by: FAMILY MEDICINE

## 2021-10-18 PROCEDURE — 99999 PR PBB SHADOW E&M-EST. PATIENT-LVL III: CPT | Mod: PBBFAC,,, | Performed by: FAMILY MEDICINE

## 2021-10-18 PROCEDURE — 99999 PR PBB SHADOW E&M-EST. PATIENT-LVL III: ICD-10-PCS | Mod: PBBFAC,,, | Performed by: FAMILY MEDICINE

## 2021-10-18 PROCEDURE — 99214 OFFICE O/P EST MOD 30 MIN: CPT | Mod: S$PBB,,, | Performed by: FAMILY MEDICINE

## 2021-10-19 ENCOUNTER — LAB VISIT (OUTPATIENT)
Dept: LAB | Facility: HOSPITAL | Age: 56
End: 2021-10-19
Attending: FAMILY MEDICINE
Payer: MEDICAID

## 2021-10-19 DIAGNOSIS — D64.9 MILD ANEMIA: ICD-10-CM

## 2021-10-19 LAB
BASOPHILS # BLD AUTO: 0.07 K/UL (ref 0–0.2)
BASOPHILS NFR BLD: 0.6 % (ref 0–1.9)
DIFFERENTIAL METHOD: ABNORMAL
EOSINOPHIL # BLD AUTO: 0.3 K/UL (ref 0–0.5)
EOSINOPHIL NFR BLD: 2.8 % (ref 0–8)
ERYTHROCYTE [DISTWIDTH] IN BLOOD BY AUTOMATED COUNT: 13.8 % (ref 11.5–14.5)
FERRITIN SERPL-MCNC: 155 NG/ML (ref 20–300)
HCT VFR BLD AUTO: 41.6 % (ref 40–54)
HGB BLD-MCNC: 12.9 G/DL (ref 14–18)
IMM GRANULOCYTES # BLD AUTO: 0.05 K/UL (ref 0–0.04)
IMM GRANULOCYTES NFR BLD AUTO: 0.4 % (ref 0–0.5)
IRON SERPL-MCNC: 49 UG/DL (ref 45–160)
IRON SERPL-MCNC: 49 UG/DL (ref 45–160)
LYMPHOCYTES # BLD AUTO: 2.1 K/UL (ref 1–4.8)
LYMPHOCYTES NFR BLD: 17.8 % (ref 18–48)
MCH RBC QN AUTO: 27.3 PG (ref 27–31)
MCHC RBC AUTO-ENTMCNC: 31 G/DL (ref 32–36)
MCV RBC AUTO: 88 FL (ref 82–98)
MONOCYTES # BLD AUTO: 0.7 K/UL (ref 0.3–1)
MONOCYTES NFR BLD: 5.7 % (ref 4–15)
NEUTROPHILS # BLD AUTO: 8.7 K/UL (ref 1.8–7.7)
NEUTROPHILS NFR BLD: 72.7 % (ref 38–73)
NRBC BLD-RTO: 0 /100 WBC
PLATELET # BLD AUTO: 270 K/UL (ref 150–450)
PMV BLD AUTO: 11.9 FL (ref 9.2–12.9)
RBC # BLD AUTO: 4.72 M/UL (ref 4.6–6.2)
RETICS/RBC NFR AUTO: 1.9 % (ref 0.4–2)
SATURATED IRON: 15 % (ref 20–50)
TOTAL IRON BINDING CAPACITY: 326 UG/DL (ref 250–450)
TRANSFERRIN SERPL-MCNC: 220 MG/DL (ref 200–375)
WBC # BLD AUTO: 12.01 K/UL (ref 3.9–12.7)

## 2021-10-19 PROCEDURE — 85045 AUTOMATED RETICULOCYTE COUNT: CPT | Performed by: FAMILY MEDICINE

## 2021-10-19 PROCEDURE — 36415 COLL VENOUS BLD VENIPUNCTURE: CPT | Mod: PO | Performed by: FAMILY MEDICINE

## 2021-10-19 PROCEDURE — 84466 ASSAY OF TRANSFERRIN: CPT | Performed by: FAMILY MEDICINE

## 2021-10-19 PROCEDURE — 82728 ASSAY OF FERRITIN: CPT | Performed by: FAMILY MEDICINE

## 2021-10-19 PROCEDURE — 85025 COMPLETE CBC W/AUTO DIFF WBC: CPT | Performed by: FAMILY MEDICINE

## 2021-10-22 ENCOUNTER — TELEPHONE (OUTPATIENT)
Dept: FAMILY MEDICINE | Facility: CLINIC | Age: 56
End: 2021-10-22
Payer: MEDICAID

## 2021-10-22 DIAGNOSIS — D50.9 IRON DEFICIENCY ANEMIA, UNSPECIFIED IRON DEFICIENCY ANEMIA TYPE: Primary | ICD-10-CM

## 2021-10-29 ENCOUNTER — PROCEDURE VISIT (OUTPATIENT)
Dept: FAMILY MEDICINE | Facility: CLINIC | Age: 56
End: 2021-10-29
Payer: MEDICAID

## 2021-10-29 DIAGNOSIS — L72.3 SEBACEOUS CYST: Primary | ICD-10-CM

## 2021-10-29 PROCEDURE — 21011 EXCISION OF LESION: ICD-10-PCS | Mod: S$PBB,,, | Performed by: FAMILY MEDICINE

## 2021-10-29 PROCEDURE — 21011 EXC FACE LES SC <2 CM: CPT | Mod: PBBFAC,PO | Performed by: FAMILY MEDICINE

## 2021-11-02 ENCOUNTER — OFFICE VISIT (OUTPATIENT)
Dept: GASTROENTEROLOGY | Facility: CLINIC | Age: 56
End: 2021-11-02
Payer: MEDICAID

## 2021-11-02 VITALS
HEIGHT: 71 IN | BODY MASS INDEX: 44.1 KG/M2 | DIASTOLIC BLOOD PRESSURE: 82 MMHG | HEART RATE: 78 BPM | WEIGHT: 315 LBS | SYSTOLIC BLOOD PRESSURE: 118 MMHG

## 2021-11-02 DIAGNOSIS — D50.9 IRON DEFICIENCY ANEMIA, UNSPECIFIED IRON DEFICIENCY ANEMIA TYPE: ICD-10-CM

## 2021-11-02 DIAGNOSIS — D63.8 ANEMIA IN OTHER CHRONIC DISEASES CLASSIFIED ELSEWHERE: ICD-10-CM

## 2021-11-02 PROBLEM — D64.89 OTHER SPECIFIED ANEMIAS: Status: ACTIVE | Noted: 2021-11-02

## 2021-11-02 PROCEDURE — 99204 OFFICE O/P NEW MOD 45 MIN: CPT | Mod: S$PBB,,, | Performed by: INTERNAL MEDICINE

## 2021-11-02 PROCEDURE — 99204 PR OFFICE/OUTPT VISIT, NEW, LEVL IV, 45-59 MIN: ICD-10-PCS | Mod: S$PBB,,, | Performed by: INTERNAL MEDICINE

## 2021-11-02 PROCEDURE — 99213 OFFICE O/P EST LOW 20 MIN: CPT | Mod: PBBFAC | Performed by: INTERNAL MEDICINE

## 2021-11-02 PROCEDURE — 99999 PR PBB SHADOW E&M-EST. PATIENT-LVL III: CPT | Mod: PBBFAC,,, | Performed by: INTERNAL MEDICINE

## 2021-11-02 PROCEDURE — 99999 PR PBB SHADOW E&M-EST. PATIENT-LVL III: ICD-10-PCS | Mod: PBBFAC,,, | Performed by: INTERNAL MEDICINE

## 2021-11-12 ENCOUNTER — CLINICAL SUPPORT (OUTPATIENT)
Dept: FAMILY MEDICINE | Facility: CLINIC | Age: 56
End: 2021-11-12
Payer: MEDICAID

## 2021-11-12 DIAGNOSIS — Z48.02 VISIT FOR SUTURE REMOVAL: Primary | ICD-10-CM

## 2021-11-12 RX ORDER — CARVEDILOL 25 MG/1
25 TABLET ORAL 2 TIMES DAILY
Qty: 180 TABLET | Refills: 1 | Status: SHIPPED | OUTPATIENT
Start: 2021-11-12 | End: 2022-10-13

## 2021-11-12 RX ORDER — LOSARTAN POTASSIUM AND HYDROCHLOROTHIAZIDE 25; 100 MG/1; MG/1
1 TABLET ORAL DAILY
Qty: 90 TABLET | Refills: 1 | Status: SHIPPED | OUTPATIENT
Start: 2021-11-12 | End: 2022-03-30

## 2022-01-24 ENCOUNTER — TELEPHONE (OUTPATIENT)
Dept: FAMILY MEDICINE | Facility: CLINIC | Age: 57
End: 2022-01-24
Payer: MEDICAID

## 2022-01-24 DIAGNOSIS — M79.671 RIGHT FOOT PAIN: ICD-10-CM

## 2022-01-24 DIAGNOSIS — M76.61 ACHILLES TENDINITIS OF RIGHT LOWER EXTREMITY: Primary | ICD-10-CM

## 2022-01-24 NOTE — TELEPHONE ENCOUNTER
----- Message from Iqra Senior sent at 1/24/2022  3:06 PM CST -----  Contact: pt  The pt request a call concerning his physical therapy orders / referral due to foot pain, no additional info given and can be reached at [phone]///[id number]

## 2022-02-02 ENCOUNTER — CLINICAL SUPPORT (OUTPATIENT)
Dept: REHABILITATION | Facility: HOSPITAL | Age: 57
End: 2022-02-02
Attending: FAMILY MEDICINE
Payer: MEDICAID

## 2022-02-02 DIAGNOSIS — M79.671 RIGHT FOOT PAIN: ICD-10-CM

## 2022-02-02 DIAGNOSIS — R29.898 WEAKNESS OF RIGHT LOWER EXTREMITY: ICD-10-CM

## 2022-02-02 DIAGNOSIS — M76.61 ACHILLES TENDINITIS OF RIGHT LOWER EXTREMITY: ICD-10-CM

## 2022-02-02 PROCEDURE — 97161 PT EVAL LOW COMPLEX 20 MIN: CPT

## 2022-02-02 PROCEDURE — 97116 GAIT TRAINING THERAPY: CPT

## 2022-02-02 PROCEDURE — 97110 THERAPEUTIC EXERCISES: CPT

## 2022-02-02 NOTE — PLAN OF CARE
"[facility]  Physical Therapy Initial Evaluation    Name: Robert Munoz  Clinic Number: [id number]    Therapy Diagnosis:   Encounter Diagnoses   Name Primary?    Achilles tendinitis of right lower extremity     Right foot pain     Weakness of right lower extremity      Physician: Alisia Guardado MD    Physician Orders: PT Eval and Treat   Medical Diagnosis from Referral:   M76.61 (ICD-10-CM) - Achilles tendinitis of right lower extremity   M79.671 (ICD-10-CM) - Right foot pain   Evaluation Date: 2/2/2022  Authorization Period Expiration: 1/24/2023  Plan of Care Expiration: 3/30/22  Visit # / Visits authorized: 1/ 1    Time In: 2:50  Time Out: 3:35  Total Billable Time: 45 minutes    Precautions: Standard    Subjective   Date of onset: 1/24/22  History of current condition - Robert reports: having gradual increase in pain roughly 4 years ago. Patient reports imagining that there was a huge spur on his heel "looks like a big ole fishing hook." Patient reports having a possible injury when he was a kid but never bothered him "because the blood flow was good while he was always working." He states that he also has low back pain that limits his mobility and now unable to work. He is treating his back with injections, however is currently awaiting a possible back procedure. He states that he is now on disability, however wants to return to work.     Pain:  Current 5/10, worst 10/10, best 2/10   Location: right ankles  Description: Aching and Sharp  Aggravating Factors: Standing and Walking without support   Easing Factors: lying down and moving his foot while offloading LE    Prior Therapy: none for his heel  Social History:  lives with their spouse  Occupation: on disability  Prior Level of Function: independent with all activities  Current Level of Function: unable to work, able to walk for roughly a couple minutes without support    Imaging, x-ray: There is a prominent enthesophyte seen at the " insertion of the right Achilles tendon with associated adjacent soft tissue swelling.  In addition, the posterosuperior aspect of the calcaneus is mildly prominent consistent with a small Ally deformity.  Small plantar calcaneal spur is noted.    Medical History:   Past Medical History:   Diagnosis Date    Hypertension        Surgical History:   Robert Munoz  has a past surgical history that includes Colonoscopy (N/A, 10/13/2021).    Medications:   Robert has a current medication list which includes the following prescription(s): carvedilol, fluticasone propionate, losartan-hydrochlorothiazide 100-25 mg, nifedipine, and suboxone.    Allergies:   Review of patient's allergies indicates:  No Known Allergies     Pts goals: walk without pain and return to work    Objective       CMS Impairment/Limitation/Restriction for FOTO Ankle Survey    Therapist reviewed FOTO scores for Robert Munoz on 2/2/2022.   FOTO documents entered into EPIC - see Media section.    Limitation Score: 77%     Gait/Balance:antalgic gait pattern with decreased stance phase of RLE    Squat:   Double leg:weight shift to the left       Structure/observation: minimal swelling compared to contralateral LE           Strength:                  R  L  L2: hip flexion     4+/5  4+/5  L3: knee extension    4-/5  5/5  S2: knee flexion    4-/5  4/5     Glut Med     4/5  4/5     Glut Max     3+/5  3+/5         R   L    DF     4+   5  PF    4+   5   Inversion    4   5   Eversion   4+   5   Toe Flex   5   5   Toe ext    5   5  AROM:  Ankle AROM:  WFL compared to contralateral LE  Knee AROM:  WFL compared to contralateral LE   Hip AROM:  WFL compared to contralateral LE; BLE limited in IR       Joint mobility:  Hypomobility noted    Palpation:  Tender to touch along achilles tendon and heel.    TREATMENT   Treatment Time In: 3:15  Treatment Time Out: 3:40  Total Treatment time separate from Evaluation: 25 minutes    Robert received therapeutic exercises to  develop strength, endurance, ROM, flexibility, posture and core stabilization for 15 minutes including:  Ankle ABC  Bridges   Gastroc stretch    Robert participated in gait training to improve functional mobility and safety for 8 minutes, including:  Ambulation with s.c. 150 feet with step through gait pattern. Education on offloading LE to reduce pain.    Home Exercises and Patient Education Provided    Education provided:   -Education on condition, HEP, and ambulation with s.c    Written Home Exercises Provided: yes.  Exercises were reviewed and Robetr was able to demonstrate them prior to the end of the session.  Robert demonstrated good  understanding of the education provided.     See EMR under Patient Instructions for exercises provided 2/2/2022.    Assessment   Robert is a 56 y.o. male referred to outpatient Physical Therapy with a medical diagnosis of achilles tendonitis. The patient presents with signs and symptoms consistent with diagnosis along with low back pain and impairments which include decreased ROM, decreased strength, decreased joint mobility, decreased muscle length, impaired balance, postural abnormalities, gait abnormalities and decreased overall function.  These impairments are limiting patient's ability to ambulate without pain, unable to work.     Pt prognosis is Good.   Pt will benefit from skilled outpatient Physical Therapy to address the deficits stated above and in the chart below, provide pt/family education, and to maximize pt's level of independence.     Plan of care discussed with patient: Yes  Pt's spiritual, cultural and educational needs considered and patient is agreeable to the plan of care and goals as stated below:     Anticipated Barriers for therapy: none    Medical Necessity is demonstrated by the following  History  Co-morbidities and personal factors that may impact the plan of care Co-morbidities:   HTN    Personal Factors:   no deficits     low   Examination  Body Structures  and Functions, activity limitations and participation restrictions that may impact the plan of care Body Regions:   back  lower extremities    Body Systems:    ROM  strength  balance  gait  transfers  motor control    Participation Restrictions:   none    Activity limitations:   Learning and applying knowledge  no deficits    General Tasks and Commands  no deficits    Communication  no deficits    Mobility  lifting and carrying objects  walking    Self care  no deficits    Domestic Life  shopping  doing house work (cleaning house, washing dishes, laundry)    Interactions/Relationships  no deficits    Life Areas  employment    Community and Social Life  community life  recreation and leisure         moderate   Clinical Presentation stable and uncomplicated low   Decision Making/ Complexity Score: low     Goals:  Short Term Goals:    1.Pt to be educated on HEP.  2.Patient to demo increased strength R LE by 1/2 grade to improve functional tasks.  3.Patient to have decreased pain to 5/10 to improve quality of movement.  4.Patient to improve score on the FOTO by 10%.    Long Term Goals:  1. Patient to perform daily activities including prolonged walking without limitation.  2. Patient to demonstrate increased R LE and core/hip/pelvis strength to 5/5 to improve functional tasks.  4. Patient to have decreased pain to 2/10 to improve quality of movement.  5. Patient to improve score on the FOTO by 20%.      Plan   Plan of care Certification: 2/2/2022 to 3/30/2022.    Outpatient Physical Therapy 2 times weekly for 8 weeks to include the following interventions: Cervical/Lumbar Traction, Electrical Stimulation TENS, Manual Therapy, Moist Heat/ Ice, Neuromuscular Re-ed, Patient Education, Self Care, Therapeutic Activities and Therapeutic Exercise.     Isamar Helton, PT    Thank you for this referral.    These services are reasonable and necessary for the conditions set forth above while under my care.

## 2022-02-03 DIAGNOSIS — I10 PRIMARY HYPERTENSION: ICD-10-CM

## 2022-02-11 ENCOUNTER — CLINICAL SUPPORT (OUTPATIENT)
Dept: REHABILITATION | Facility: HOSPITAL | Age: 57
End: 2022-02-11
Attending: FAMILY MEDICINE
Payer: MEDICAID

## 2022-02-11 DIAGNOSIS — R29.898 WEAKNESS OF RIGHT LOWER EXTREMITY: ICD-10-CM

## 2022-02-11 PROCEDURE — 97110 THERAPEUTIC EXERCISES: CPT | Mod: CQ

## 2022-02-11 NOTE — PROGRESS NOTES
[facility]   Physical Therapy Assistant Treatment Note     Name: Robert Munoz  Clinic Number: [id number]    Therapy Diagnosis:   Encounter Diagnosis   Name Primary?    Weakness of right lower extremity      Physician: Alisia Guardado MD    Visit Date: 2/11/2022      Physician Orders: PT Eval and Treat   Medical Diagnosis from Referral:   M76.61 (ICD-10-CM) - Achilles tendinitis of right lower extremity   M79.671 (ICD-10-CM) - Right foot pain   Evaluation Date: 2/2/2022  Authorization Period Expiration: 5/12/2022  Plan of Care Expiration: 3/30/22  Visit # / Visits authorized: 1/ 12 + eval      PTA Visit #: 1/5     Time In: 3:10  Time Out: 4:05  Total Billable Time: 45 minutes    SUBJECTIVE     Pt reports: going to get bone spur cut out of foot after therapy if it doesn't get better. Patient has back pain and they also want to do procedure for back. His back is the reason why he isnt working at the moment because he can't tolerate standing   He was compliant with home exercise program.  Response to previous treatment: no complaints  Functional change: nothing significant    Pain: 4/10  Location: right ankles     OBJECTIVE     Objective Measures updated at progress report unless specified.     Treatment     Robert received the treatments listed below:      Robert received therapeutic exercises to develop strength, endurance, ROM, flexibility, posture and core stabilization for 45 minutes including:    Nustep 5' for mobility and LE ROM  Ankle ABC  Ankle pumps  Wobble board seated  Bridges 2x10  Gastroc stretch  Ankle 4 way RTB  SLR 2x10  Side lying hip abd 2x10    cold pack for 10 minutes to right ankle.        Patient Education and Home Exercises     Home Exercises and Patient Education Provided     Education provided:   -Education on condition, HEP, and ambulation with s.c     Written Home Exercises Provided: yes.  Exercises were reviewed and Robert was able to demonstrate them prior to the end  of the session.  Robert demonstrated good  understanding of the education provided.      See EMR under Patient Instructions for exercises provided 2/2/2022.    ASSESSMENT     Patient presents to therapy amb with straight cane. Patient has increased swelling in bilateral lower legs and right ankle. Educated patient on use of compression socks to decrease swelling. He is unable to put shoes on fully because of low back pain. He performed exercises for ankle and hip strengthening to decrease load on ankle when standing and walking. Patient will benefit from continued skilled therapy to improve lower extremity strength and functional mobility    Robert Is progressing well towards his goals.   Pt prognosis is Good.     Pt will continue to benefit from skilled outpatient physical therapy to address the deficits listed in the problem list box on initial evaluation, provide pt/family education and to maximize pt's level of independence in the home and community environment.     Pt's spiritual, cultural and educational needs considered and pt agreeable to plan of care and goals.     Anticipated barriers to physical therapy: none    Goals:  Short Term Goals:    1.Pt to be educated on HEP.  2.Patient to demo increased strength R LE by 1/2 grade to improve functional tasks.  3.Patient to have decreased pain to 5/10 to improve quality of movement.  4.Patient to improve score on the FOTO by 10%.     Long Term Goals:  1. Patient to perform daily activities including prolonged walking without limitation.  2. Patient to demonstrate increased R LE and core/hip/pelvis strength to 5/5 to improve functional tasks.  4. Patient to have decreased pain to 2/10 to improve quality of movement.  5. Patient to improve score on the FOTO by 20%.        Plan   Plan of care Certification: 2/2/2022 to 3/30/2022.     Outpatient Physical Therapy 2 times weekly for 8 weeks to include the following interventions: Cervical/Lumbar Traction, Electrical  Stimulation TENS, Manual Therapy, Moist Heat/ Ice, Neuromuscular Re-ed, Patient Education, Self Care, Therapeutic Activities and Therapeutic Exercise.     Clair Corcoran, PTA

## 2022-02-11 NOTE — PROGRESS NOTES
"[facility]   Physical Therapy Treatment Note     Name: Robert Munoz  Clinic Number: [id number]    Therapy Diagnosis: No diagnosis found.  Physician: Alisia Guardado MD    Visit Date: 2/11/2022    [copy and paste header from tank here]    PTA Visit #: ***/5     Time In: ***  Time Out: ***  Total Billable Time: *** minutes    SUBJECTIVE     Pt reports: ***.  He {Actions; was/was not:97643} compliant with home exercise program.  Response to previous treatment: ***  Functional change: ***    Pain: {0-10:12098::"0"}/10  Location: {RIGHT/LEFT/BILATERAL:18308} {LOCATION ON BODY:40702}     OBJECTIVE     Objective Measures updated at progress report unless specified.     Treatment     Robert received the treatments listed below:      therapeutic exercises to develop {AMB PT PROGRESS OBJECTIVE:51296} for *** minutes including:  ***    manual therapy techniques: {AMB PT PROGRESS MANUAL THERAPY:70395} were applied to the: *** for *** minutes, including:  ***    neuromuscular re-education activities to improve: {AMB PT PROGRESS NEURO RE-ED:49566} for *** minutes. The following activities were included:  ***    therapeutic activities to improve functional performance for ***  minutes, including:  ***    gait training to improve functional mobility and safety for ***  minutes, including:  ***    direct contact modalities after being cleared for contraindications: {AMB PT PROGRESS DIRECT CONTACT MODES:27394}    supervised modalities after being cleared for contradictions: {AMB PT SUPERVISED MODES:60585}    hot pack for *** minutes to ***.    cold pack for *** minutes to ***.        Patient Education and Home Exercises     Home Exercises Provided and Patient Education Provided     Education provided:   - ***    Written Home Exercises Provided: {Blank single:48798::"yes","Patient instructed to cont prior HEP"}. Exercises were reviewed and Robert was able to demonstrate them prior to the end of the session.  " Robert demonstrated {Desc; good/fair/poor:65086} understanding of the education provided. See EMR under Patient Instructions for exercises provided during therapy sessions    ASSESSMENT     ***    Robert {IS/IS NOT:02404} progressing well towards his goals.   Pt prognosis is {REHAB PROGNOSIS OHS:04799}.     Pt will continue to benefit from skilled outpatient physical therapy to address the deficits listed in the problem list box on initial evaluation, provide pt/family education and to maximize pt's level of independence in the home and community environment.     Pt's spiritual, cultural and educational needs considered and pt agreeable to plan of care and goals.     Anticipated barriers to physical therapy: ***    Goals: ***    PLAN     ***    Sneha Lott, PTA

## 2022-02-22 ENCOUNTER — CLINICAL SUPPORT (OUTPATIENT)
Dept: REHABILITATION | Facility: HOSPITAL | Age: 57
End: 2022-02-22
Attending: FAMILY MEDICINE
Payer: MEDICAID

## 2022-02-22 DIAGNOSIS — M79.671 RIGHT FOOT PAIN: ICD-10-CM

## 2022-02-22 DIAGNOSIS — R29.898 WEAKNESS OF RIGHT LOWER EXTREMITY: Primary | ICD-10-CM

## 2022-02-22 PROCEDURE — 97140 MANUAL THERAPY 1/> REGIONS: CPT | Mod: CQ

## 2022-02-22 PROCEDURE — 97110 THERAPEUTIC EXERCISES: CPT | Mod: CQ

## 2022-02-22 NOTE — PROGRESS NOTES
"[facility]   Physical Therapy Assistant Treatment Note     Name: Robert Munoz  Clinic Number: [id number]    Therapy Diagnosis:   Encounter Diagnoses   Name Primary?    Weakness of right lower extremity Yes    Right foot pain      Physician: Alisia Guardado MD    Visit Date: 2/22/2022      Physician Orders: PT Eval and Treat   Medical Diagnosis from Referral:   M76.61 (ICD-10-CM) - Achilles tendinitis of right lower extremity   M79.671 (ICD-10-CM) - Right foot pain   Evaluation Date: 2/2/2022  Authorization Period Expiration: 5/12/2022  Plan of Care Expiration: 3/30/22  Visit # / Visits authorized: 2/ 12 + eval      PTA Visit #: 2/5     Time In: 2:55  Time Out: 3:50  Total Billable Time: 45 minutes    SUBJECTIVE     Pt reports: has not been coming due to transportation; wants the spur "cut out"  He was not compliant with home exercise program.  Response to previous treatment: no complaints  Functional change: na at this time    Pain: 7/10  Location: right ankle    OBJECTIVE     Objective Measures updated at progress report unless specified.     Treatment     Robert received the treatments listed below:      Robert received therapeutic exercises to develop strength, endurance, ROM, flexibility, posture and core stabilization for 35 minutes including:    No shoes:  Prone ankle DF/PF x20  Seated toe yoga 2x10 ea  Seated gastroc stretch strap 30" x5  Ankle ABC 1 set  Bridges 2x10  Side lying hip abduction hold 4 sec 2x10  Ankle circles CW/CCW x20 ea  Standing 10# KB dead lifts x15 (increase to 15# next)  Standing 1/2 roll stretch (foot would slip--may need to do on wedge incline next)    Manual therapy x10 min to include  IASTM to R plantar surface, heel, calf, posterior ankle  PROM        Patient Education and Home Exercises     Home Exercises and Patient Education Provided     Education provided:   -Education on condition, HEP     Written Home Exercises Provided: Robert was instructed to continue " prior exercises.Exercises were reviewed and Robert was able to demonstrate them prior to the end of the session.  Robert demonstrated good  understanding of the education provided.      See EMR under Patient Instructions for exercises provided 2/2/2022.    ASSESSMENT     Patient presented to therapy ambulating with a straight cane with an antalgic gait pattern due to pain and weight. He was very tender to manual but able to tolerate as needed.  He performed exercises for ankle, foot and hip to decrease load on ankle when standing and walking. Education regarding importance of attending therapy however patient reports transportation difficulties.  Patient will benefit from continued skilled therapy to improve lower extremity strength and functional mobility. Robert demonstrated a positive response to today's session as evidenced with activity tolerance and reported feeling more mobility end of session.     Robert Is progressing well towards his goals.   Pt prognosis is Good.     Pt will continue to benefit from skilled outpatient physical therapy to address the deficits listed in the problem list box on initial evaluation, provide pt/family education and to maximize pt's level of independence in the home and community environment.     Pt's spiritual, cultural and educational needs considered and pt agreeable to plan of care and goals.     Anticipated barriers to physical therapy:   Transportation and HEP compliance    Goals:  Short Term Goals:    1.Pt to be educated on HEP.  2.Patient to demo increased strength R LE by 1/2 grade to improve functional tasks.  3.Patient to have decreased pain to 5/10 to improve quality of movement.  4.Patient to improve score on the FOTO by 10%.     Long Term Goals:  1. Patient to perform daily activities including prolonged walking without limitation.  2. Patient to demonstrate increased R LE and core/hip/pelvis strength to 5/5 to improve functional tasks.  4. Patient to have decreased pain  to 2/10 to improve quality of movement.  5. Patient to improve score on the FOTO by 20%.        Plan   Plan of care Certification: 2/2/2022 to 3/30/2022.     Outpatient Physical Therapy 2 times weekly for 8 weeks to include the following interventions: Cervical/Lumbar Traction, Electrical Stimulation TENS, Manual Therapy, Moist Heat/ Ice, Neuromuscular Re-ed, Patient Education, Self Care, Therapeutic Activities and Therapeutic Exercise.     Sneha Lott, PTA

## 2022-02-25 ENCOUNTER — CLINICAL SUPPORT (OUTPATIENT)
Dept: REHABILITATION | Facility: HOSPITAL | Age: 57
End: 2022-02-25
Attending: FAMILY MEDICINE
Payer: MEDICAID

## 2022-02-25 DIAGNOSIS — R29.898 WEAKNESS OF RIGHT LOWER EXTREMITY: Primary | ICD-10-CM

## 2022-02-25 PROCEDURE — 97140 MANUAL THERAPY 1/> REGIONS: CPT

## 2022-02-25 PROCEDURE — 97110 THERAPEUTIC EXERCISES: CPT

## 2022-02-25 NOTE — PROGRESS NOTES
"[facility]   Physical Therapy Treatment Note     Name: Robert Munoz  Clinic Number: [id number]    Therapy Diagnosis:   Encounter Diagnosis   Name Primary?    Weakness of right lower extremity Yes     Physician: Alisia Guardado MD    Visit Date: 2/25/2022      Physician Orders: PT Eval and Treat   Medical Diagnosis from Referral:   M76.61 (ICD-10-CM) - Achilles tendinitis of right lower extremity   M79.671 (ICD-10-CM) - Right foot pain   Evaluation Date: 2/2/2022  Authorization Period Expiration: 5/12/2022  Plan of Care Expiration: 3/30/22  Visit # / Visits authorized: 2/ 12 + eval      PTA Visit #: 2/5     Time In: 2:55  Time Out: 3:50  Total Billable Time: 45 minutes    SUBJECTIVE     Pt reports: has not been coming due to transportation; wants the spur "cut out"  He was not compliant with home exercise program.  Response to previous treatment: no complaints  Functional change: na at this time    Pain: 7/10  Location: right ankle    OBJECTIVE     Objective Measures updated at progress report unless specified.     Treatment     Robert received the treatments listed below:      Robert received therapeutic exercises to develop strength, endurance, ROM, flexibility, posture and core stabilization for 35 minutes including:    No shoes:  Nustep   Ankle 4 way RTB 2 x 15  PPT with proper breathing  Seated toe yoga 2x10 ea  Seated gastroc stretch strap 30" x5  Ankle ABC 2 set  Bridges 2x10  Side lying hip abduction hold 4 sec 2x15      Manual therapy x10 min to include  IASTM to R plantar surface, heel, calf, posterior ankle  PROM        Patient Education and Home Exercises     Home Exercises and Patient Education Provided     Education provided:   -Education on condition, HEP     Written Home Exercises Provided: Robert was instructed to continue prior exercises.Exercises were reviewed and Robert was able to demonstrate them prior to the end of the session.  Robert demonstrated good  understanding of the " education provided.      See EMR under Patient Instructions for exercises provided 2/2/2022.    ASSESSMENT     Patient presented to therapy ambulating with a straight cane with an antalgic gait pattern.  He performed exercises for ankle as well as hip/core to decrease load on ankle when standing and walking. Increased tenderness with STM with tool.  Robert demonstrated a positive response to today's session as evidenced with activity tolerance and reported feeling more mobility end of session.     Robert Is progressing well towards his goals.   Pt prognosis is Good.     Pt will continue to benefit from skilled outpatient physical therapy to address the deficits listed in the problem list box on initial evaluation, provide pt/family education and to maximize pt's level of independence in the home and community environment.     Pt's spiritual, cultural and educational needs considered and pt agreeable to plan of care and goals.     Anticipated barriers to physical therapy:   Transportation and HEP compliance    Goals:  Short Term Goals:    1.Pt to be educated on HEP.  2.Patient to demo increased strength R LE by 1/2 grade to improve functional tasks.  3.Patient to have decreased pain to 5/10 to improve quality of movement.  4.Patient to improve score on the FOTO by 10%.     Long Term Goals:  1. Patient to perform daily activities including prolonged walking without limitation.  2. Patient to demonstrate increased R LE and core/hip/pelvis strength to 5/5 to improve functional tasks.  4. Patient to have decreased pain to 2/10 to improve quality of movement.  5. Patient to improve score on the FOTO by 20%.        Plan   Plan of care Certification: 2/2/2022 to 3/30/2022.     Outpatient Physical Therapy 2 times weekly for 8 weeks to include the following interventions: Cervical/Lumbar Traction, Electrical Stimulation TENS, Manual Therapy, Moist Heat/ Ice, Neuromuscular Re-ed, Patient Education, Self Care, Therapeutic  Activities and Therapeutic Exercise.     Isamar Helton, PT

## 2022-03-02 ENCOUNTER — PATIENT MESSAGE (OUTPATIENT)
Dept: ENDOSCOPY | Facility: HOSPITAL | Age: 57
End: 2022-03-02
Payer: MEDICAID

## 2022-03-04 ENCOUNTER — CLINICAL SUPPORT (OUTPATIENT)
Dept: REHABILITATION | Facility: HOSPITAL | Age: 57
End: 2022-03-04
Attending: FAMILY MEDICINE
Payer: MEDICAID

## 2022-03-04 DIAGNOSIS — R29.898 WEAKNESS OF RIGHT LOWER EXTREMITY: Primary | ICD-10-CM

## 2022-03-04 PROCEDURE — 97110 THERAPEUTIC EXERCISES: CPT | Mod: CQ

## 2022-03-04 PROCEDURE — 97140 MANUAL THERAPY 1/> REGIONS: CPT | Mod: CQ

## 2022-03-04 NOTE — PROGRESS NOTES
"[facility]   Physical Therapist Assistant Treatment Note     Name: Robert Munoz  Clinic Number: [id number]    Therapy Diagnosis:   Encounter Diagnosis   Name Primary?    Weakness of right lower extremity Yes     Physician: Alisia Guardado MD    Visit Date: 3/4/2022      Physician Orders: PT Eval and Treat   Medical Diagnosis from Referral:   M76.61 (ICD-10-CM) - Achilles tendinitis of right lower extremity   M79.671 (ICD-10-CM) - Right foot pain   Evaluation Date: 2/2/2022  Authorization Period Expiration: 5/12/2022  Plan of Care Expiration: 3/30/22  Visit # / Visits authorized: 4/ 12 + eval     FOTO next visit  FOTO 1/3    PTA Visit #: 1/5     Time In: 3:00  Time Out: 3:55  Total Billable Time: 55 minutes    SUBJECTIVE     Pt reports:just bought a car and will be able to attend regularly now; wants the spur "cut out" and will try to set up the surgery  He was compliant with home exercise program.  Response to previous treatment: no complaints  Functional change: na at this time    Pain: 0/10  Location: right ankle    OBJECTIVE     Objective Measures updated at progress report unless specified.     Treatment     Robert received the treatments listed below:      Robert received therapeutic exercises to develop strength, endurance, ROM, flexibility, posture and core stabilization for 45 minutes including:    No shoes:  Nustep for mobility and cardio pulmonary endurance 8 min  Ankle 4 way GTB 2 x 15  Seated toe yoga 2x10 ea  Seated towel scrunches x15  Ankle ABC 2 set  PPT 2x15  Bridges on the ball 2x10  B Side lying hip abduction  2x15  Standing 15# KB dead lifts 2x15   Standing incline gastroc stretch 1min    Manual therapy x10 min to include  IASTM to R plantar surface, heel, calf, posterior ankle  PROM        Patient Education and Home Exercises     Home Exercises and Patient Education Provided     Education provided:   -Education on condition, HEP     Written Home Exercises Provided: Robert " was instructed to continue prior exercises.Exercises were reviewed and Robert was able to demonstrate them prior to the end of the session.  Robert demonstrated good  understanding of the education provided.      See EMR under Patient Instructions for exercises provided 2/2/2022.    ASSESSMENT     Patient presented to therapy ambulating with a straight cane . He is reporting he now has periods of time when he does not have pain; he has pain with lack of activity or at rest. Moving/exercise helps relieve the pain. He also reports he is ready of surgery but it has not been scheduled. He continues to be tender to palpation around the bone spur. Today, he continued with mobility and strengthening which included his glutes for improved LE support.  Robert demonstrated a positive response to today's session as evidenced with activity tolerance and reported feeling more mobility end of session.     Robert Is progressing well towards his goals.   Pt prognosis is Good.     Pt will continue to benefit from skilled outpatient physical therapy to address the deficits listed in the problem list box on initial evaluation, provide pt/family education and to maximize pt's level of independence in the home and community environment.     Pt's spiritual, cultural and educational needs considered and pt agreeable to plan of care and goals.     Anticipated barriers to physical therapy:   Transportation and HEP compliance    Goals:  Short Term Goals:    1.Pt to be educated on HEP.  2.Patient to demo increased strength R LE by 1/2 grade to improve functional tasks.  3.Patient to have decreased pain to 5/10 to improve quality of movement.  4.Patient to improve score on the FOTO by 10%.     Long Term Goals:  1. Patient to perform daily activities including prolonged walking without limitation.  2. Patient to demonstrate increased R LE and core/hip/pelvis strength to 5/5 to improve functional tasks.  4. Patient to have decreased pain to 2/10 to  improve quality of movement.  5. Patient to improve score on the FOTO by 20%.        Plan   Plan of care Certification: 2/2/2022 to 3/30/2022.     Outpatient Physical Therapy 2 times weekly for 8 weeks to include the following interventions: Cervical/Lumbar Traction, Electrical Stimulation TENS, Manual Therapy, Moist Heat/ Ice, Neuromuscular Re-ed, Patient Education, Self Care, Therapeutic Activities and Therapeutic Exercise.     Sneha Lott, PTA

## 2022-03-23 ENCOUNTER — TELEPHONE (OUTPATIENT)
Dept: INTERNAL MEDICINE | Facility: CLINIC | Age: 57
End: 2022-03-23
Payer: MEDICAID

## 2022-03-23 NOTE — TELEPHONE ENCOUNTER
Spoke with Verónica. Needed to schedule an appt for leg pain.  Gave next available appt.  Verbally understands.

## 2022-03-23 NOTE — TELEPHONE ENCOUNTER
----- Message from Nichelle Benitez sent at 3/23/2022  1:07 PM CDT -----  Please call pt back regarding scheduling an appt. Pt stated that his sinuses are causing him issues and he has recurring water blisters.   [phone]

## 2022-03-25 ENCOUNTER — CLINICAL SUPPORT (OUTPATIENT)
Dept: REHABILITATION | Facility: HOSPITAL | Age: 57
End: 2022-03-25
Payer: MEDICAID

## 2022-03-25 DIAGNOSIS — R29.898 WEAKNESS OF RIGHT LOWER EXTREMITY: Primary | ICD-10-CM

## 2022-03-25 PROCEDURE — 97110 THERAPEUTIC EXERCISES: CPT

## 2022-03-25 PROCEDURE — 97140 MANUAL THERAPY 1/> REGIONS: CPT

## 2022-03-25 NOTE — PROGRESS NOTES
[facility]   Physical Therapist Assistant Treatment Note     Name: Robert Munoz  Clinic Number: [id number]    Therapy Diagnosis:   Encounter Diagnosis   Name Primary?    Weakness of right lower extremity Yes     Physician: Alisia Guardado MD    Visit Date: 3/25/2022    Physician Orders: PT Eval and Treat   Medical Diagnosis from Referral:   M76.61 (ICD-10-CM) - Achilles tendinitis of right lower extremity   M79.671 (ICD-10-CM) - Right foot pain   Evaluation Date: 2/2/2022  Authorization Period Expiration: 5/12/2022  Plan of Care Expiration: 3/30/22  Visit # / Visits authorized: 5/12 + eval     FOTO 1/3    PTA Visit #: -/5     Time In: 3:00 pm  Time Out: 3:45 pm  Total Billable Time: 30 minutes    SUBJECTIVE     Pt reports: that he has been doing his exercises, but still having some sharp pains. Reports that he did not take his second dose of BP yet- usually takes it around 4:00.   He was compliant with home exercise program.  Response to previous treatment: no complaints  Functional change: na at this time    Pain: 0/10  Location: right ankle    OBJECTIVE     Objective Measures updated at progress report unless specified.     B LE measurements:  All measurements starting from inferior patellar pole      R  L   5 inches below:  20.75 in 20.25 in  8 inches below:  18.25 in 19 in  11 inches below:  15.25 in 16 in      Treatment     Robert received the treatments listed below:      Robert received therapeutic exercises to develop strength, endurance, ROM, flexibility, posture and core stabilization for 10 minutes including:    Nustep for mobility and cardio pulmonary endurance 10 min  Seated ankle ABCs    Manual therapy x 20 min to include:  Assessment of B LE fluid    Patient Education and Home Exercises     Home Exercises and Patient Education Provided     Education provided:   -Education on condition, HEP     Written Home Exercises Provided: Robert was instructed to continue prior  exercises.Exercises were reviewed and Robert was able to demonstrate them prior to the end of the session.  Robert demonstrated good  understanding of the education provided.      See EMR under Patient Instructions for exercises provided 2/2/2022.    ASSESSMENT     Noted patient to have excessive B LE swelling and reported he usually does have swelling, but it seems to be more. Also, he initially thought his leg irritation was due to chemical burn but educated that it seems to be high BP related. Took his BP two different times 15 min apart: 165/95 and 155/100. Educated patient to contact MD to try and get an appt earlier to make sure all of his levels look okay due to the high BP and increased in swelling.    Robert Is progressing well towards his goals.   Pt prognosis is Good.     Pt will continue to benefit from skilled outpatient physical therapy to address the deficits listed in the problem list box on initial evaluation, provide pt/family education and to maximize pt's level of independence in the home and community environment.     Pt's spiritual, cultural and educational needs considered and pt agreeable to plan of care and goals.     Anticipated barriers to physical therapy: none    Goals:  Short Term Goals:    1.Pt to be educated on HEP. MET  2.Patient to demo increased strength R LE by 1/2 grade to improve functional tasks. PROGRESSING, NOT MET  3.Patient to have decreased pain to 5/10 to improve quality of movement. PROGRESSING, NOT MET  4.Patient to improve score on the FOTO by 10%. PROGRESSING, NOT MET     Long Term Goals:  1. Patient to perform daily activities including prolonged walking without limitation. PROGRESSING, NOT MET  2. Patient to demonstrate increased R LE and core/hip/pelvis strength to 5/5 to improve functional tasks. PROGRESSING, NOT MET  4. Patient to have decreased pain to 2/10 to improve quality of movement. PROGRESSING, NOT MET  5. Patient to improve score on the FOTO by 20%.  PROGRESSING, NOT MET      Plan   Plan of care Certification: 2/2/2022 to 3/30/2022.     Outpatient Physical Therapy 2 times weekly for 8 weeks to include the following interventions: Cervical/Lumbar Traction, Electrical Stimulation TENS, Manual Therapy, Moist Heat/ Ice, Neuromuscular Re-ed, Patient Education, Self Care, Therapeutic Activities and Therapeutic Exercise.     Ralph Whitlock, PT

## 2022-03-30 ENCOUNTER — OFFICE VISIT (OUTPATIENT)
Dept: FAMILY MEDICINE | Facility: CLINIC | Age: 57
End: 2022-03-30
Payer: MEDICAID

## 2022-03-30 VITALS
HEIGHT: 71 IN | WEIGHT: 315 LBS | DIASTOLIC BLOOD PRESSURE: 89 MMHG | BODY MASS INDEX: 44.1 KG/M2 | SYSTOLIC BLOOD PRESSURE: 139 MMHG | OXYGEN SATURATION: 96 % | HEART RATE: 89 BPM | TEMPERATURE: 99 F

## 2022-03-30 DIAGNOSIS — I87.2 ULCER OF EXTREMITY DUE TO CHRONIC VENOUS INSUFFICIENCY: ICD-10-CM

## 2022-03-30 DIAGNOSIS — L98.499 ULCER OF EXTREMITY DUE TO CHRONIC VENOUS INSUFFICIENCY: ICD-10-CM

## 2022-03-30 DIAGNOSIS — J30.89 ALLERGIC RHINITIS DUE TO OTHER ALLERGIC TRIGGER, UNSPECIFIED SEASONALITY: Primary | ICD-10-CM

## 2022-03-30 DIAGNOSIS — I10 PRIMARY HYPERTENSION: ICD-10-CM

## 2022-03-30 PROCEDURE — 99999 PR PBB SHADOW E&M-EST. PATIENT-LVL III: ICD-10-PCS | Mod: PBBFAC,,, | Performed by: FAMILY MEDICINE

## 2022-03-30 PROCEDURE — 3079F DIAST BP 80-89 MM HG: CPT | Mod: CPTII,,, | Performed by: FAMILY MEDICINE

## 2022-03-30 PROCEDURE — 99213 OFFICE O/P EST LOW 20 MIN: CPT | Mod: PBBFAC,PO | Performed by: FAMILY MEDICINE

## 2022-03-30 PROCEDURE — 1160F PR REVIEW ALL MEDS BY PRESCRIBER/CLIN PHARMACIST DOCUMENTED: ICD-10-PCS | Mod: CPTII,,, | Performed by: FAMILY MEDICINE

## 2022-03-30 PROCEDURE — 99214 OFFICE O/P EST MOD 30 MIN: CPT | Mod: S$PBB,,, | Performed by: FAMILY MEDICINE

## 2022-03-30 PROCEDURE — 3075F SYST BP GE 130 - 139MM HG: CPT | Mod: CPTII,,, | Performed by: FAMILY MEDICINE

## 2022-03-30 PROCEDURE — 4010F ACE/ARB THERAPY RXD/TAKEN: CPT | Mod: CPTII,,, | Performed by: FAMILY MEDICINE

## 2022-03-30 PROCEDURE — 1159F MED LIST DOCD IN RCRD: CPT | Mod: CPTII,,, | Performed by: FAMILY MEDICINE

## 2022-03-30 PROCEDURE — 3079F PR MOST RECENT DIASTOLIC BLOOD PRESSURE 80-89 MM HG: ICD-10-PCS | Mod: CPTII,,, | Performed by: FAMILY MEDICINE

## 2022-03-30 PROCEDURE — 1159F PR MEDICATION LIST DOCUMENTED IN MEDICAL RECORD: ICD-10-PCS | Mod: CPTII,,, | Performed by: FAMILY MEDICINE

## 2022-03-30 PROCEDURE — 99999 PR PBB SHADOW E&M-EST. PATIENT-LVL III: CPT | Mod: PBBFAC,,, | Performed by: FAMILY MEDICINE

## 2022-03-30 PROCEDURE — 3008F PR BODY MASS INDEX (BMI) DOCUMENTED: ICD-10-PCS | Mod: CPTII,,, | Performed by: FAMILY MEDICINE

## 2022-03-30 PROCEDURE — 3075F PR MOST RECENT SYSTOLIC BLOOD PRESS GE 130-139MM HG: ICD-10-PCS | Mod: CPTII,,, | Performed by: FAMILY MEDICINE

## 2022-03-30 PROCEDURE — 4010F PR ACE/ARB THEARPY RXD/TAKEN: ICD-10-PCS | Mod: CPTII,,, | Performed by: FAMILY MEDICINE

## 2022-03-30 PROCEDURE — 99214 PR OFFICE/OUTPT VISIT, EST, LEVL IV, 30-39 MIN: ICD-10-PCS | Mod: S$PBB,,, | Performed by: FAMILY MEDICINE

## 2022-03-30 PROCEDURE — 1160F RVW MEDS BY RX/DR IN RCRD: CPT | Mod: CPTII,,, | Performed by: FAMILY MEDICINE

## 2022-03-30 PROCEDURE — 3008F BODY MASS INDEX DOCD: CPT | Mod: CPTII,,, | Performed by: FAMILY MEDICINE

## 2022-03-30 RX ORDER — VALSARTAN AND HYDROCHLOROTHIAZIDE 320; 12.5 MG/1; MG/1
1 TABLET, FILM COATED ORAL DAILY
Qty: 90 TABLET | Refills: 3 | Status: SHIPPED | OUTPATIENT
Start: 2022-03-30 | End: 2023-02-20

## 2022-03-30 RX ORDER — AZELASTINE 1 MG/ML
1 SPRAY, METERED NASAL 2 TIMES DAILY
Qty: 30 ML | Refills: 4 | Status: SHIPPED | OUTPATIENT
Start: 2022-03-30 | End: 2022-04-19 | Stop reason: SDUPTHER

## 2022-03-30 RX ORDER — LEVOCETIRIZINE DIHYDROCHLORIDE 5 MG/1
5 TABLET, FILM COATED ORAL NIGHTLY
Qty: 30 TABLET | Refills: 11 | Status: SHIPPED | OUTPATIENT
Start: 2022-03-30 | End: 2023-05-25

## 2022-03-30 NOTE — PROGRESS NOTES
Chief Complaint:    Chief Complaint   Patient presents with    Hypertension       History of Present Illness:  03/30/2022:-  Patient with a hx of right Ally's deformity, weakness of right lower extremity, tobacco use, and gait abnormality presents today for hypertension     Seeing podiatry for his right heel.   Reports congestion. Denies sinus pain. Needs refill on Flonase. Wants to see ENT. Sneezes a lot.   Systolic can get up to 160 at home. Didn't take his medicine this morning.   Reports leg swelling. Get blisters on his legs. Using Noxzema on his legs.       10/18/2021:-  He is here for a 2 week follow-up for high blood pressure. Weight is down 4 lbs. Patient has gotten colonoscopy done. He has been watching diet.    He would also like to schedule removal cyst on the back of scalp.      10/04/2021:-  He is here for follow-up he says his blood pressure been running high daily ever since he had a 2nd COVID shot.  He has been seeing physical therapy for his heel pain and did a few sessions that made his back pain worse so he could go anymore but he says he might be doing going there again.  He has a lump on the back of his scalp which seems to be growing in size although it is not tender.  He will be going back to Physical Medicine for injection of the back.      05/19/2021:-  He is having lot of pain in his heel he has done some exercises but is not helping it hurts initially and then starts to get better as he walks.      02/03/2021:-  Complaining of right heel pain he said this is a chronic problem hurts to walk injury other than some injury as a child  Still has back pain recently nerve conduction study which was possibly not suggestive of lumbar radiculopathy but the test was limited by body habitus.      12/23/2020:-  He is here for follow-up he is waiting to get a nerve conduction study on his lower extremity  Labs reveal that he has prediabetic  Also revealed mild anemia and mildly elevated ALT  denies alcohol use he is morbidly obese however.  He has a history abuse of decongestant nasally which he quit doing it but he is having difficulty with the use of Flonase and is complaining of constant congestion.    11/17/2020:-  Patient is new to the practice  He says he suffers with chronic back pain for a number years he describes the pain as in the low back and slight activity for example washing dishes or lawn mowing be backing or slight walking causes severe pain in the back and his legs get weak feels like he is about to fall down and gives out any strength in his legs.  Denies any tingling or numbness he describes the pain as does radiating to the legs specially with exertion.  He has not had any testing done other than just x-ray and has been sent for physical therapy at least few times which he says has not done him any good.  He says he is unable to work like this he that he wants his back fixed or he wants to go on disability.    He is also being treated for hypertension  Never had a colonoscopy      ROS:  Review of Systems   Constitutional: Negative for activity change, chills, fatigue, fever and unexpected weight change.   HENT: Positive for congestion and sneezing. Negative for ear discharge, ear pain, hearing loss, postnasal drip, rhinorrhea and sinus pain.    Eyes: Negative for pain and visual disturbance.   Respiratory: Negative for cough, chest tightness and shortness of breath.    Cardiovascular: Positive for leg swelling. Negative for chest pain and palpitations.   Gastrointestinal: Negative for abdominal pain, diarrhea and vomiting.   Endocrine: Negative for heat intolerance.   Genitourinary: Negative for dysuria, flank pain, frequency and hematuria.   Musculoskeletal: Negative for gait problem and neck pain.   Skin: Negative for color change and rash.   Neurological: Negative for dizziness, tremors, seizures, numbness and headaches.   Psychiatric/Behavioral: Negative for agitation,  "hallucinations, self-injury, sleep disturbance and suicidal ideas. The patient is not nervous/anxious.    All other systems reviewed and are negative.      Past Medical History:   Diagnosis Date    Hypertension        Social History:  Social History     Socioeconomic History    Marital status:    Tobacco Use    Smoking status: Never Smoker    Smokeless tobacco: Current User     Types: Snuff   Substance and Sexual Activity    Alcohol use: No    Drug use: No    Sexual activity: Yes       Family History:   family history includes Anuerysm in his mother; Diabetes in his mother; Heart disease in his father.    Health Maintenance   Topic Date Due    Lipid Panel  12/23/2025    TETANUS VACCINE  10/04/2031    Hepatitis C Screening  Completed       Physical Exam:    Vital Signs  Temp: 98.8 °F (37.1 °C)  Pulse: 89  SpO2: 96 %  BP: (!) 160/93  Height and Weight  Height: 5' 11" (180.3 cm)  Weight: (!) 166.1 kg (366 lb 2.9 oz)  BSA (Calculated - sq m): 2.88 sq meters  BMI (Calculated): 51.1  Weight in (lb) to have BMI = 25: 178.9]    Body mass index is 51.07 kg/m².    Physical Exam  Vitals and nursing note reviewed.   Constitutional:       Appearance: Normal appearance. He is obese.   HENT:      Head: Normocephalic and atraumatic.      Right Ear: Tympanic membrane normal.      Left Ear: Tympanic membrane normal.   Eyes:      Extraocular Movements: Extraocular movements intact.      Pupils: Pupils are equal, round, and reactive to light.   Cardiovascular:      Rate and Rhythm: Normal rate and regular rhythm.      Pulses: Normal pulses.      Heart sounds: Normal heart sounds. No murmur heard.    No gallop.   Pulmonary:      Effort: Pulmonary effort is normal. No respiratory distress.      Breath sounds: Normal breath sounds. No wheezing, rhonchi or rales.   Abdominal:      General: There is no distension.      Palpations: Abdomen is soft.      Tenderness: There is no abdominal tenderness.   Musculoskeletal:         " General: No swelling, deformity or signs of injury. Normal range of motion.      Cervical back: Normal range of motion.      Right lower leg: Edema present.      Left lower leg: Edema present.   Skin:     General: Skin is warm and dry.      Capillary Refill: Capillary refill takes less than 2 seconds.      Coloration: Skin is not jaundiced or pale.          Neurological:      General: No focal deficit present.      Mental Status: He is alert and oriented to person, place, and time.   Psychiatric:         Mood and Affect: Mood normal.         Behavior: Behavior normal.           Assessment:      ICD-10-CM ICD-9-CM   1. Allergic rhinitis due to other allergic trigger, unspecified seasonality  J30.89 477.8   2. Primary hypertension  I10 401.9   3. Ulcer of extremity due to chronic venous insufficiency  L98.499 459.81    I87.2 707.9         Plan:       Refer to Allergy for allergic rhinitis. Recommend Xyzal Astelin nasal spray  Continue monitoring blood pressure. Keep < 140/90. Recommend Diovan-HCT.  Discontinue losartan  Recommend lymphedema wrap. Wear compression stockings daily.   Continue diet plan and working on weight loss  Follow up with labs    Orders Placed This Encounter   Procedures    Ambulatory referral/consult to Allergy       Current Outpatient Medications   Medication Sig Dispense Refill    azelastine (ASTELIN) 137 mcg (0.1 %) nasal spray 1 spray (137 mcg total) by Nasal route 2 (two) times daily. 30 mL 4    carvediloL (COREG) 25 MG tablet Take 1 tablet (25 mg total) by mouth 2 (two) times a day. 180 tablet 1    fluticasone propionate (FLONASE) 50 mcg/actuation nasal spray 1 spray (50 mcg total) by Each Nostril route once daily. 16 g 0    levocetirizine (XYZAL) 5 MG tablet Take 1 tablet (5 mg total) by mouth every evening. 30 tablet 11    NIFEdipine (PROCARDIA-XL) 30 MG (OSM) 24 hr tablet Take 1 tablet (30 mg total) by mouth every evening. 30 tablet 11    SUBOXONE 12-3 mg Film Place 0.5 Film under  the tongue 3 (three) times daily.      valsartan-hydrochlorothiazide (DIOVAN-HCT) 320-12.5 mg per tablet Take 1 tablet by mouth once daily. 90 tablet 3     No current facility-administered medications for this visit.       Medications Discontinued During This Encounter   Medication Reason    losartan-hydrochlorothiazide 100-25 mg (HYZAAR) 100-25 mg per tablet        No follow-ups on file.      Alisia Guardado MD  Scribe Attestation:   I, Venkatesh Perez, am scribing for, and in the presence of, Dr.Arif Guardado I performed the above scribed service and the documentation accurately describes the services I performed. I attest to the accuracy of the note.    I, Dr. Alisia Guardado, reviewed documentation as scribed above. I personally performed the services described in this documentation.  I agree that the record reflects my personal performance and is accurate and complete. Alisia Guardado MD.  10/04/2021

## 2022-03-31 ENCOUNTER — TELEPHONE (OUTPATIENT)
Dept: FAMILY MEDICINE | Facility: CLINIC | Age: 57
End: 2022-03-31
Payer: MEDICAID

## 2022-04-07 ENCOUNTER — CLINICAL SUPPORT (OUTPATIENT)
Dept: REHABILITATION | Facility: HOSPITAL | Age: 57
End: 2022-04-07
Payer: MEDICAID

## 2022-04-07 DIAGNOSIS — R29.898 WEAKNESS OF RIGHT LOWER EXTREMITY: Primary | ICD-10-CM

## 2022-04-07 PROCEDURE — 97110 THERAPEUTIC EXERCISES: CPT

## 2022-04-07 NOTE — PLAN OF CARE
Outpatient Therapy Updated Plan of Care     Visit Date: 4/7/2022  Name: Robert Munoz  Clinic Number: [id number]    Therapy Diagnosis:   Encounter Diagnosis   Name Primary?    Weakness of right lower extremity Yes     Physician: Alisia Guardado MD    Physician Orders: PT Eval and Treat   Medical Diagnosis from Referral:   M76.61 (ICD-10-CM) - Achilles tendinitis of right lower extremity   M79.671 (ICD-10-CM) - Right foot pain   Evaluation Date: 2/2/2022    Total Visits Received: 6  Cancelled Visits: 2  No Show Visits: 3    Current Certification Period:  4/7/22 to 6/7/22  Precautions:  HTN  Visits from Evaluation Date:  6  Functional Level Prior to Evaluation:  IND    Subjective     Update: Patient reports that the visit with his MD went well and they adjusted his BP medication and he reports having more energy as well as overall feeling better. Still having some occasional heel pain.    Objective     Update:     Gait/Balance: antalgic gait pattern with decreased stance phase of RLE     Squat:                         Double leg:weight shift to the left                                        Structure/observation:  a lot of swelling to both legs- checked out by MD and is heart related                                                                                        Strength:                                                                                                                                                     R                      L  L2: hip flexion                                                  4+/5                 4+/5  L3: knee extension                                          4-/5                  5/5  S2: knee flexion                                              4-/5                  4/5                                      Glut Med                                                         4/5                   4/5                                      Glut Max                                                           3+/5                 3+/5                                                                                      R                                  L                          DF                                           4+                                5  PF                                            4+                                5              Inversion                                 4                                  5              Eversion                                  4+                                5              Toe Flex                                  5                                  5              Toe ext                                    5                                  5  AROM:  Ankle AROM:             WFL compared to contralateral LE  Knee AROM:              WFL compared to contralateral LE   Hip AROM:                 WFL compared to contralateral LE; BLE limited in IR                             Joint mobility:  Hypomobility noted     Palpation:  Tender to touch along achilles tendon and heel.    Assessment     Update: Patient has demonstrated improvement in quality of gait, improvement in tolerance to more upright activities, and improvement in swelling since initial evaluation.     Short Term Goals:    1.Pt to be educated on HEP. MET  2.Patient to demo increased strength R LE by 1/2 grade to improve functional tasks. PROGRESSING, NOT MET  3.Patient to have decreased pain to 5/10 to improve quality of movement. PROGRESSING, NOT MET  4.Patient to improve score on the FOTO by 10%. PROGRESSING, NOT MET     Long Term Goals: 8 weeks  1. Patient to perform daily activities including prolonged walking without limitation. PROGRESSING, NOT MET  2. Patient to demonstrate increased R LE and core/hip/pelvis strength to 5/5 to improve functional tasks. PROGRESSING, NOT MET  4. Patient to have decreased pain to 2/10 to improve quality of movement. PROGRESSING, NOT MET  5. Patient  to improve score on the FOTO by 20%. PROGRESSING, NOT MET    Reasons for Recertification of Therapy:   Patient's progress in therapy has been limited due to transportation issues as well as medical issues with high BP and a lot of B LE swelling. I referred him back to the MD and they were able to adjust his medications and the last visit was his first back after having stable BP. Patient needs continued skilled therapy in order to improve ankle ROM, decrease pain/adverse symptoms, improve strength, improve quality of gait, and improve tolerance to activity in order to return to PLOF and improve quality of life.    Plan     Updated Certification Period: 4/7/2022 to 6/7/22  Recommended Treatment Plan: 2 times per week for 8 weeks: Manual Therapy, Moist Heat/ Ice, Neuromuscular Re-ed, Patient Education and Therapeutic Exercise  Other Recommendations: SHERRELL Whitlock, PT  4/7/2022      I CERTIFY THE NEED FOR THESE SERVICES FURNISHED UNDER THIS PLAN OF TREATMENT AND WHILE UNDER MY CARE    Physician's comments:        Physician's Signature: ___________________________________________________

## 2022-04-07 NOTE — PROGRESS NOTES
[facility]   Physical Therapist Treatment Note     Name: Robert Munoz  Clinic Number: [id number]    Therapy Diagnosis:   Encounter Diagnosis   Name Primary?    Weakness of right lower extremity Yes     Physician: Alisia Guardado MD    Visit Date: 4/7/2022    Physician Orders: PT Eval and Treat   Medical Diagnosis from Referral:   M76.61 (ICD-10-CM) - Achilles tendinitis of right lower extremity   M79.671 (ICD-10-CM) - Right foot pain   Evaluation Date: 2/2/2022  Authorization Period Expiration: 5/12/2022  Plan of Care Expiration: 6/7/22  Visit # / Visits authorized: 6/12 + eval     PTA Visit #: -/5     Time In: 4:05 pm  Time Out: 4:50 pm  Total Billable Time: 45 minutes    SUBJECTIVE     Pt reports: that they changed his BP medication and he is feeling a lot better.  He was compliant with home exercise program.  Response to previous treatment: no complaints  Functional change: improvement in tolerance to walking    Pain: 1/10  Location: right ankle    OBJECTIVE     Objective Measures updated at progress report unless specified.     Treatment     Robert received the treatments listed below:      Robert received therapeutic exercises to develop strength, endurance, ROM, flexibility, posture and core stabilization for 45 minutes including:    Nustep for mobility and cardio pulmonary endurance 7 min  Resisted seated ankle DF against TB x 20 reps 3 second holds  Seated B EV against TB x 20 reps   Standing UE bike 3 min F/3 min B for standing task  Bridges 2 x 10  Standing incline gastroc stretch 1 min  DF stretch onto stairs x 15 reps  TRX band squats 2 x 10  Standing heel raises with ankle ball squeeze 2 x 10    Manual therapy 0 min to include  NA today    Patient Education and Home Exercises     Home Exercises and Patient Education Provided     Education provided:   -Education on condition, HEP     Written Home Exercises Provided: Robert was instructed to continue prior exercises.Exercises were  reviewed and Robert was able to demonstrate them prior to the end of the session.  Robert demonstrated good  understanding of the education provided.      See EMR under Patient Instructions for exercises provided prior visit.    ASSESSMENT     See in [facility]    Robert Is progressing well towards his goals.   Pt prognosis is Good.     Pt will continue to benefit from skilled outpatient physical therapy to address the deficits listed in the problem list box on initial evaluation, provide pt/family education and to maximize pt's level of independence in the home and community environment.     Pt's spiritual, cultural and educational needs considered and pt agreeable to plan of care and goals.     Anticipated barriers to physical therapy:   Transportation and HEP compliance    Goals:  Short Term Goals:    1.Pt to be educated on HEP. MET  2.Patient to demo increased strength R LE by 1/2 grade to improve functional tasks. PROGRESSING, NOT MET  3.Patient to have decreased pain to 5/10 to improve quality of movement. PROGRESSING, NOT MET  4.Patient to improve score on the FOTO by 10%. PROGRESSING, NOT MET     Long Term Goals:  1. Patient to perform daily activities including prolonged walking without limitation. PROGRESSING, NOT MET  2. Patient to demonstrate increased R LE and core/hip/pelvis strength to 5/5 to improve functional tasks. PROGRESSING, NOT MET  4. Patient to have decreased pain to 2/10 to improve quality of movement. PROGRESSING, NOT MET  5. Patient to improve score on the FOTO by 20%. PROGRESSING, NOT MET       Plan   Plan of care Certification: 4/7/22 to 6/7/22     Outpatient Physical Therapy 2 times weekly for 8 weeks to include the following interventions: Cervical/Lumbar Traction, Electrical Stimulation TENS, Manual Therapy, Moist Heat/ Ice, Neuromuscular Re-ed, Patient Education, Self Care, Therapeutic Activities and Therapeutic Exercise.     Ralph Whitlock, PT

## 2022-04-11 ENCOUNTER — CLINICAL SUPPORT (OUTPATIENT)
Dept: REHABILITATION | Facility: HOSPITAL | Age: 57
End: 2022-04-11
Payer: MEDICAID

## 2022-04-11 DIAGNOSIS — R29.898 WEAKNESS OF RIGHT LOWER EXTREMITY: Primary | ICD-10-CM

## 2022-04-11 PROCEDURE — 97110 THERAPEUTIC EXERCISES: CPT | Mod: CQ

## 2022-04-11 NOTE — PROGRESS NOTES
[facility]   Physical Therapist Assistant Treatment Note     Name: Robert Munoz  Clinic Number: [id number]    Therapy Diagnosis:   Encounter Diagnosis   Name Primary?    Weakness of right lower extremity Yes     Physician: Alisia Guardado MD    Visit Date: 4/11/2022    Physician Orders: PT Eval and Treat   Medical Diagnosis from Referral:   M76.61 (ICD-10-CM) - Achilles tendinitis of right lower extremity   M79.671 (ICD-10-CM) - Right foot pain   Evaluation Date: 2/2/2022  Authorization Period Expiration: 5/12/2022  Plan of Care Expiration: 6/7/22  Visit # / Visits authorized: 7/12 + eval     PTA Visit #: 1/5     Time In: 4:05 pm  Time Out: 5:00 pm  Total Billable Time: 53 minutes    SUBJECTIVE     Pt reports: doing ok; exercises help ease some of his pain  He was compliant with home exercise program.  Response to previous treatment: no issues  Functional change: improvement in tolerance to walking    Pain: 3/10  Location: right heel     OBJECTIVE     Objective Measures updated at progress report unless specified.     Treatment     Robert received the treatments listed below:      Robert received therapeutic exercises to develop strength, endurance, ROM, flexibility, posture and core stabilization for 53 minutes including:    Shuttle 5 bands 3 min (D/C after today due to low back discomfort getting on/off the shuttle)  Shuttle heel raises into calf stretches 3 bands 2x10 (D/C after today due to low back discomfort getting on/off the shuttle)  Resisted ankle DF against GTB 2x15 reps 3 second holds  Resisted ankle EV against GTB 2x15  Bridges w/ball squeeze 2 x 10  Standing incline static stretch x 1min  DF stretch on stairs x 20 reps  TRX band squats 2 x 10  Standing UE bike 5 min alternating between heel raise and squat every 15 revolutions  Nustep for joint mobility and cardio pulmonary endurance 7 min      Manual therapy 0 min to include  NA today    Patient Education and Home Exercises      Home Exercises and Patient Education Provided     Education provided:   -Education on condition, HEP     Written Home Exercises Provided: Robert was instructed to continue prior exercises.Exercises were reviewed and Robert was able to demonstrate them prior to the end of the session.  Robert demonstrated good  understanding of the education provided.      See EMR under Patient Instructions for exercises provided prior visit.    ASSESSMENT     Robert presented today ambulating with his standard cane with an antalgic gait pattern due to his right heel and low back pain. Today we continued with mobility and strengthening activities with cueing for form. Plantarflexion of the ankle most painful/limited due to compression of the joint space on the spur at his heel. Overall, Robert demonstrated a positive response to today's session as evidenced with his activity tolerance. He had one episode of some mild burning pain with stretching which resolved.     Robert Is progressing well towards his goals.   Pt prognosis is Good.     Pt will continue to benefit from skilled outpatient physical therapy to address the deficits listed in the problem list box on initial evaluation, provide pt/family education and to maximize pt's level of independence in the home and community environment.     Pt's spiritual, cultural and educational needs considered and pt agreeable to plan of care and goals.     Anticipated barriers to physical therapy:   Transportation and HEP compliance    Goals:  Short Term Goals:    1.Pt to be educated on HEP. MET  2.Patient to demo increased strength R LE by 1/2 grade to improve functional tasks. PROGRESSING, NOT MET  3.Patient to have decreased pain to 5/10 to improve quality of movement. PROGRESSING, NOT MET  4.Patient to improve score on the FOTO by 10%. PROGRESSING, NOT MET     Long Term Goals:  1. Patient to perform daily activities including prolonged walking without limitation. PROGRESSING, NOT MET  2. Patient  to demonstrate increased R LE and core/hip/pelvis strength to 5/5 to improve functional tasks. PROGRESSING, NOT MET  4. Patient to have decreased pain to 2/10 to improve quality of movement. PROGRESSING, NOT MET  5. Patient to improve score on the FOTO by 20%. PROGRESSING, NOT MET       Plan   Plan of care Certification: 4/7/22 to 6/7/22     Outpatient Physical Therapy 2 times weekly for 8 weeks to include the following interventions: Cervical/Lumbar Traction, Electrical Stimulation TENS, Manual Therapy, Moist Heat/ Ice, Neuromuscular Re-ed, Patient Education, Self Care, Therapeutic Activities and Therapeutic Exercise.     Sneha Lott, PTA

## 2022-04-14 ENCOUNTER — PATIENT MESSAGE (OUTPATIENT)
Dept: FAMILY MEDICINE | Facility: CLINIC | Age: 57
End: 2022-04-14
Payer: MEDICAID

## 2022-04-18 ENCOUNTER — CLINICAL SUPPORT (OUTPATIENT)
Dept: REHABILITATION | Facility: HOSPITAL | Age: 57
End: 2022-04-18
Payer: MEDICAID

## 2022-04-18 DIAGNOSIS — R29.898 WEAKNESS OF RIGHT LOWER EXTREMITY: Primary | ICD-10-CM

## 2022-04-18 PROCEDURE — 97110 THERAPEUTIC EXERCISES: CPT | Mod: CQ

## 2022-04-18 NOTE — PROGRESS NOTES
[facility]   Physical Therapist Assistant Treatment Note     Name: Robert Munoz  Clinic Number: [id number]    Therapy Diagnosis:   Encounter Diagnosis   Name Primary?    Weakness of right lower extremity Yes     Physician: Alisia Guardado MD    Visit Date: 4/18/2022    Physician Orders: PT Eval and Treat   Medical Diagnosis from Referral:   M76.61 (ICD-10-CM) - Achilles tendinitis of right lower extremity   M79.671 (ICD-10-CM) - Right foot pain   Evaluation Date: 2/2/2022  Authorization Period Expiration: 5/12/2022  Plan of Care Expiration: 6/7/22  Visit # / Visits authorized: 8/12 + eval     FOTO 2/3    PTA Visit #: 2/5     Time In: 5:44 pm  (patient 14 min late)  Time Out: 6:23  Total Billable Time: 38 minutes    SUBJECTIVE     Pt reports: doing ok; exercises help ease some of his pain  He was compliant with home exercise program.  Response to previous treatment: no issues  Functional change: improvement in tolerance to walking; walking without cane    Pain: 0/10 in right heel  (having low back pain)    OBJECTIVE     Objective Measures updated at progress report unless specified.     Treatment     Robert received the treatments listed below:      Robert received therapeutic exercises to develop strength, endurance, ROM, flexibility, posture and core stabilization for 38 minutes including:  Seated R heel raise w/20# DB on thigh 2x15  Resisted ankle DF against BTB 2x15   Resisted ankle EV against BTB 2x15  Standing incline static stretch x 1min  DF stretch on stairs x 15 reps  TRX band squats 2 x 10  Standing glute med x10 ea leg  Standing UE bike 5 min alternating between heel raise and squat every 15 revolutions  Nustep for joint mobility and cardio pulmonary endurance 5 min      Deferred:  Shuttle 5 bands 3 min (D/C after today due to low back discomfort getting on/off the shuttle)  Shuttle heel raises into calf stretches 3 bands 2x10 (D/C after today due to low back discomfort getting  on/off the shuttle)  Bridges w/ball squeeze 2 x 10    Manual therapy 0 min to include  NA today    Patient Education and Home Exercises     Home Exercises and Patient Education Provided     Education provided:   -Education on condition, HEP     Written Home Exercises Provided: Robert was instructed to continue prior exercises.Exercises were reviewed and Robert was able to demonstrate them prior to the end of the session.  Robert demonstrated good  understanding of the education provided.      See EMR under Patient Instructions for exercises provided prior visit.    ASSESSMENT     Robert presented today ambulating without his standard cane with an antalgic gait pattern due to his left knee low back pain; his right heel is doing well today. Today we continued with mobility and strengthening activities with cueing for form. Plantarflexion of the ankle most painful/limited due to compression of the joint space on the spur at his heel. Supine activities deferred today as Robert demonstrated decreased tolerance to this position.  Overall, Robert demonstrated a positive response to today's session as he did not have any right heel pain with his activities.   Robert Is progressing well towards his goals.   Pt prognosis is Good.     Pt will continue to benefit from skilled outpatient physical therapy to address the deficits listed in the problem list box on initial evaluation, provide pt/family education and to maximize pt's level of independence in the home and community environment.     Pt's spiritual, cultural and educational needs considered and pt agreeable to plan of care and goals.     Anticipated barriers to physical therapy:   Transportation and HEP compliance    Goals:  Short Term Goals:    1.Pt to be educated on HEP. MET  2.Patient to demo increased strength R LE by 1/2 grade to improve functional tasks. PROGRESSING, NOT MET  3.Patient to have decreased pain to 5/10 to improve quality of movement. PROGRESSING, NOT  MET  4.Patient to improve score on the FOTO by 10%. PROGRESSING, NOT MET     Long Term Goals:  1. Patient to perform daily activities including prolonged walking without limitation. PROGRESSING, NOT MET  2. Patient to demonstrate increased R LE and core/hip/pelvis strength to 5/5 to improve functional tasks. PROGRESSING, NOT MET  4. Patient to have decreased pain to 2/10 to improve quality of movement. PROGRESSING, NOT MET  5. Patient to improve score on the FOTO by 20%. PROGRESSING, NOT MET       Plan   Plan of care Certification: 4/7/22 to 6/7/22     Outpatient Physical Therapy 2 times weekly for 8 weeks to include the following interventions: Cervical/Lumbar Traction, Electrical Stimulation TENS, Manual Therapy, Moist Heat/ Ice, Neuromuscular Re-ed, Patient Education, Self Care, Therapeutic Activities and Therapeutic Exercise.     Sneha Lott, PTA

## 2022-04-19 ENCOUNTER — OFFICE VISIT (OUTPATIENT)
Dept: FAMILY MEDICINE | Facility: CLINIC | Age: 57
End: 2022-04-19
Payer: MEDICAID

## 2022-04-19 VITALS
DIASTOLIC BLOOD PRESSURE: 56 MMHG | SYSTOLIC BLOOD PRESSURE: 108 MMHG | HEART RATE: 86 BPM | BODY MASS INDEX: 44.1 KG/M2 | OXYGEN SATURATION: 96 % | HEIGHT: 71 IN | WEIGHT: 315 LBS | TEMPERATURE: 98 F

## 2022-04-19 DIAGNOSIS — D50.9 IRON DEFICIENCY ANEMIA, UNSPECIFIED IRON DEFICIENCY ANEMIA TYPE: Primary | ICD-10-CM

## 2022-04-19 DIAGNOSIS — R09.81 CHRONIC NASAL CONGESTION: ICD-10-CM

## 2022-04-19 PROCEDURE — 3078F PR MOST RECENT DIASTOLIC BLOOD PRESSURE < 80 MM HG: ICD-10-PCS | Mod: CPTII,,, | Performed by: FAMILY MEDICINE

## 2022-04-19 PROCEDURE — 99214 OFFICE O/P EST MOD 30 MIN: CPT | Mod: PBBFAC,PO | Performed by: FAMILY MEDICINE

## 2022-04-19 PROCEDURE — 4010F ACE/ARB THERAPY RXD/TAKEN: CPT | Mod: CPTII,,, | Performed by: FAMILY MEDICINE

## 2022-04-19 PROCEDURE — 99999 PR PBB SHADOW E&M-EST. PATIENT-LVL IV: CPT | Mod: PBBFAC,,, | Performed by: FAMILY MEDICINE

## 2022-04-19 PROCEDURE — 1159F PR MEDICATION LIST DOCUMENTED IN MEDICAL RECORD: ICD-10-PCS | Mod: CPTII,,, | Performed by: FAMILY MEDICINE

## 2022-04-19 PROCEDURE — 99213 PR OFFICE/OUTPT VISIT, EST, LEVL III, 20-29 MIN: ICD-10-PCS | Mod: S$PBB,,, | Performed by: FAMILY MEDICINE

## 2022-04-19 PROCEDURE — 3074F SYST BP LT 130 MM HG: CPT | Mod: CPTII,,, | Performed by: FAMILY MEDICINE

## 2022-04-19 PROCEDURE — 1159F MED LIST DOCD IN RCRD: CPT | Mod: CPTII,,, | Performed by: FAMILY MEDICINE

## 2022-04-19 PROCEDURE — 3008F BODY MASS INDEX DOCD: CPT | Mod: CPTII,,, | Performed by: FAMILY MEDICINE

## 2022-04-19 PROCEDURE — 99213 OFFICE O/P EST LOW 20 MIN: CPT | Mod: S$PBB,,, | Performed by: FAMILY MEDICINE

## 2022-04-19 PROCEDURE — 3074F PR MOST RECENT SYSTOLIC BLOOD PRESSURE < 130 MM HG: ICD-10-PCS | Mod: CPTII,,, | Performed by: FAMILY MEDICINE

## 2022-04-19 PROCEDURE — 4010F PR ACE/ARB THEARPY RXD/TAKEN: ICD-10-PCS | Mod: CPTII,,, | Performed by: FAMILY MEDICINE

## 2022-04-19 PROCEDURE — 3078F DIAST BP <80 MM HG: CPT | Mod: CPTII,,, | Performed by: FAMILY MEDICINE

## 2022-04-19 PROCEDURE — 3008F PR BODY MASS INDEX (BMI) DOCUMENTED: ICD-10-PCS | Mod: CPTII,,, | Performed by: FAMILY MEDICINE

## 2022-04-19 PROCEDURE — 99999 PR PBB SHADOW E&M-EST. PATIENT-LVL IV: ICD-10-PCS | Mod: PBBFAC,,, | Performed by: FAMILY MEDICINE

## 2022-04-19 RX ORDER — AZELASTINE 1 MG/ML
1 SPRAY, METERED NASAL 2 TIMES DAILY
Qty: 30 ML | Refills: 4 | Status: SHIPPED | OUTPATIENT
Start: 2022-04-19 | End: 2022-06-13 | Stop reason: SDUPTHER

## 2022-04-19 RX ORDER — FLUTICASONE PROPIONATE 50 MCG
1 SPRAY, SUSPENSION (ML) NASAL DAILY
Qty: 16 G | Refills: 0 | Status: SHIPPED | OUTPATIENT
Start: 2022-04-19 | End: 2022-06-05 | Stop reason: SDUPTHER

## 2022-04-19 NOTE — PROGRESS NOTES
Chief Complaint:    Chief Complaint   Patient presents with    Follow-up       History of Present Illness:  04/19/2022:-  Patient with a hx of right Ally's deformity, weakness of right lower extremity, tobacco use, and gait abnormality for a follow up.     Reports sinus congestion. Has been having sinus problems for over a year. No relief with flonase and astelin. Has appt with Dr. Eliana Balderas on 05/25.  His leg swelling has gone down. Started Diovan-HCT. Discontinued losartan.       03/30/2022:-  Patient with a hx of right Ally's deformity, weakness of right lower extremity, tobacco use, and gait abnormality presents today for hypertension     Seeing podiatry for his right heel.   Reports congestion. Denies sinus pain. Needs refill on Flonase. Wants to see ENT. Sneezes a lot.   Systolic can get up to 160 at home. Didn't take his medicine this morning.   Reports leg swelling. Get blisters on his legs. Using Noxzema on his legs.       10/18/2021:-  He is here for a 2 week follow-up for high blood pressure. Weight is down 4 lbs. Patient has gotten colonoscopy done. He has been watching diet.    He would also like to schedule removal cyst on the back of scalp.      10/04/2021:-  He is here for follow-up he says his blood pressure been running high daily ever since he had a 2nd COVID shot.  He has been seeing physical therapy for his heel pain and did a few sessions that made his back pain worse so he could go anymore but he says he might be doing going there again.  He has a lump on the back of his scalp which seems to be growing in size although it is not tender.  He will be going back to Physical Medicine for injection of the back.      05/19/2021:-  He is having lot of pain in his heel he has done some exercises but is not helping it hurts initially and then starts to get better as he walks.      02/03/2021:-  Complaining of right heel pain he said this is a chronic problem hurts to walk injury other than some  injury as a child  Still has back pain recently nerve conduction study which was possibly not suggestive of lumbar radiculopathy but the test was limited by body habitus.      12/23/2020:-  He is here for follow-up he is waiting to get a nerve conduction study on his lower extremity  Labs reveal that he has prediabetic  Also revealed mild anemia and mildly elevated ALT denies alcohol use he is morbidly obese however.  He has a history abuse of decongestant nasally which he quit doing it but he is having difficulty with the use of Flonase and is complaining of constant congestion.    11/17/2020:-  Patient is new to the practice  He says he suffers with chronic back pain for a number years he describes the pain as in the low back and slight activity for example washing dishes or lawn mowing be backing or slight walking causes severe pain in the back and his legs get weak feels like he is about to fall down and gives out any strength in his legs.  Denies any tingling or numbness he describes the pain as does radiating to the legs specially with exertion.  He has not had any testing done other than just x-ray and has been sent for physical therapy at least few times which he says has not done him any good.  He says he is unable to work like this he that he wants his back fixed or he wants to go on disability.    He is also being treated for hypertension  Never had a colonoscopy      ROS:  Review of Systems   Constitutional: Negative for appetite change, chills and fever.   HENT: Positive for congestion. Negative for ear pain, postnasal drip, rhinorrhea, sinus pressure and sinus pain.    Eyes: Negative for pain.   Respiratory: Negative for cough, chest tightness and shortness of breath.    Cardiovascular: Negative for chest pain, palpitations and leg swelling.   Gastrointestinal: Negative for abdominal pain, blood in stool, constipation, diarrhea and nausea.   Genitourinary: Negative for difficulty urinating, dysuria,  "flank pain and hematuria.   Musculoskeletal: Negative for arthralgias and myalgias.   Skin: Negative for pallor and wound.   Neurological: Negative for dizziness, tremors, speech difficulty, light-headedness and headaches.   Psychiatric/Behavioral: Negative for behavioral problems, dysphoric mood and sleep disturbance. The patient is not nervous/anxious.    All other systems reviewed and are negative.      Past Medical History:   Diagnosis Date    Hypertension        Social History:  Social History     Socioeconomic History    Marital status:    Tobacco Use    Smoking status: Never Smoker    Smokeless tobacco: Current User     Types: Snuff   Substance and Sexual Activity    Alcohol use: No    Drug use: No    Sexual activity: Yes       Family History:   family history includes Anuerysm in his mother; Diabetes in his mother; Heart disease in his father.    Health Maintenance   Topic Date Due    Hemoglobin A1c  05/17/2021    Lipid Panel  12/23/2025    TETANUS VACCINE  10/04/2031    Hepatitis C Screening  Completed       Physical Exam:    Vital Signs  Temp: 97.9 °F (36.6 °C)  Pulse: 86  SpO2: 96 %  BP: (!) 108/56  Height and Weight  Height: 5' 11" (180.3 cm)  Weight: (!) 164.4 kg (362 lb 7 oz)  BSA (Calculated - sq m): 2.87 sq meters  BMI (Calculated): 50.6  Weight in (lb) to have BMI = 25: 178.9]    Body mass index is 50.55 kg/m².    Physical Exam  Vitals and nursing note reviewed.   Constitutional:       Appearance: Normal appearance. He is morbidly obese.   HENT:      Head: Normocephalic and atraumatic.      Right Ear: Tympanic membrane normal.      Left Ear: Tympanic membrane normal.      Nose: Congestion present.   Eyes:      Extraocular Movements: Extraocular movements intact.      Pupils: Pupils are equal, round, and reactive to light.   Cardiovascular:      Rate and Rhythm: Normal rate and regular rhythm.      Pulses: Normal pulses.      Heart sounds: Normal heart sounds. No murmur heard.    No " gallop.   Pulmonary:      Effort: Pulmonary effort is normal. No respiratory distress.      Breath sounds: Normal breath sounds. No wheezing, rhonchi or rales.   Abdominal:      General: There is no distension.      Palpations: Abdomen is soft.      Tenderness: There is no abdominal tenderness.   Musculoskeletal:         General: No swelling, deformity or signs of injury. Normal range of motion.      Cervical back: Normal range of motion.   Skin:     General: Skin is warm and dry.      Capillary Refill: Capillary refill takes less than 2 seconds.      Coloration: Skin is not jaundiced or pale.   Neurological:      General: No focal deficit present.      Mental Status: He is alert and oriented to person, place, and time.   Psychiatric:         Mood and Affect: Mood normal.         Behavior: Behavior normal.           Assessment:      ICD-10-CM ICD-9-CM   1. Chronic nasal congestion  R09.81 478.19         Plan:       Follow up with Dr. Eliana Balderas on 05/25 at 1:00 PM  Refer to ENT for chronic nasal congestion.     Orders Placed This Encounter   Procedures    Ambulatory referral/consult to ENT       Current Outpatient Medications   Medication Sig Dispense Refill    carvediloL (COREG) 25 MG tablet Take 1 tablet (25 mg total) by mouth 2 (two) times a day. 180 tablet 1    levocetirizine (XYZAL) 5 MG tablet Take 1 tablet (5 mg total) by mouth every evening. 30 tablet 11    SUBOXONE 12-3 mg Film Place 0.5 Film under the tongue 3 (three) times daily.      valsartan-hydrochlorothiazide (DIOVAN-HCT) 320-12.5 mg per tablet Take 1 tablet by mouth once daily. 90 tablet 3    azelastine (ASTELIN) 137 mcg (0.1 %) nasal spray 1 spray (137 mcg total) by Nasal route 2 (two) times daily. 30 mL 4    fluticasone propionate (FLONASE) 50 mcg/actuation nasal spray 1 spray (50 mcg total) by Each Nostril route once daily. 16 g 0    NIFEdipine (PROCARDIA-XL) 30 MG (OSM) 24 hr tablet Take 1 tablet (30 mg total) by mouth every evening. 30  tablet 11     No current facility-administered medications for this visit.       Medications Discontinued During This Encounter   Medication Reason    fluticasone propionate (FLONASE) 50 mcg/actuation nasal spray Reorder    azelastine (ASTELIN) 137 mcg (0.1 %) nasal spray Reorder       No follow-ups on file.      Alisia Guardado MD  Scribe Attestation:   I, Jessica Cruz, am scribing for, and in the presence of, Dr.Arif Guardado I performed the above scribed service and the documentation accurately describes the services I performed. I attest to the accuracy of the note.    I, Dr. Alisia Guardado, reviewed documentation as scribed above. I performed the services described in this documentation.  I agree that the record reflects my personal performance and is accurate and complete. Alisia Guardado MD.  04/19/2022

## 2022-04-21 ENCOUNTER — CLINICAL SUPPORT (OUTPATIENT)
Dept: REHABILITATION | Facility: HOSPITAL | Age: 57
End: 2022-04-21
Payer: MEDICAID

## 2022-04-21 DIAGNOSIS — R29.898 WEAKNESS OF RIGHT LOWER EXTREMITY: Primary | ICD-10-CM

## 2022-04-21 PROCEDURE — 97110 THERAPEUTIC EXERCISES: CPT | Mod: CQ

## 2022-04-21 NOTE — PROGRESS NOTES
[facility]   Physical Therapist Assistant Treatment Note     Name: Robert Munoz  Clinic Number: [id number]    Therapy Diagnosis:   Encounter Diagnosis   Name Primary?    Weakness of right lower extremity Yes     Physician: Alisia Guardado MD    Visit Date: 4/21/2022    Physician Orders: PT Eval and Treat   Medical Diagnosis from Referral:   M76.61 (ICD-10-CM) - Achilles tendinitis of right lower extremity   M79.671 (ICD-10-CM) - Right foot pain   Evaluation Date: 2/2/2022  Authorization Period Expiration: 5/12/2022  Plan of Care Expiration: 6/7/22  Visit # / Visits authorized: 9/12 + eval     FOTO 2/3    PTA Visit #: 3/5     Time In: 4:50  Time Out: 5:34  Total Billable Time: 40 minutes    SUBJECTIVE     Pt reports: not feeling well today and having some pain  He was compliant with home exercise program.  Response to previous treatment: no issues  Functional change: improvement in tolerance to walking; walking without cane    Pain: 4/10 in right heel  Post ex pain 2/10    OBJECTIVE     Objective Measures updated at progress report unless specified.     Treatment     Robert received the treatments listed below:      Robert received therapeutic exercises to develop strength, endurance, ROM, flexibility, posture and core stabilization for 40 minutes including:  Seated R heel raise w/25# DB on thigh 2x15  Resisted ankle DF against BTB 2x15   Resisted ankle EV against BTB 2x15  Bridges 2x10  Side lying hip abduction B 2x10  Standing incline static stretch x 1min  DF stretch on stairs x 15 reps  TRX band squats 2 x 10  Standing glute med x10 ea leg  Standing UE bike 5 min alternating between heel raise and squat every 15 revolutions  Nustep for joint mobility and cardio pulmonary endurance 8 min      Deferred:  Shuttle 5 bands 3 min (hold after today due to low back discomfort getting on/off the shuttle)  Shuttle heel raises into calf stretches 3 bands 2x10 (hold after today due to low back  discomfort getting on/off the shuttle)      Manual therapy 0 min to include  NA today    Patient Education and Home Exercises     Home Exercises and Patient Education Provided     Education provided:   -Education on condition, HEP     Written Home Exercises Provided: Robert was instructed to continue prior exercises.Exercises were reviewed and Robert was able to demonstrate them prior to the end of the session.  Robert demonstrated good  understanding of the education provided.      See EMR under Patient Instructions for exercises provided prior visit.    ASSESSMENT     Robert presented today ambulating without his standard cane with an antalgic gait pattern due to his left knee and low back pain; his right heel is doing well but more painful today than previously. Today we continued with mobility and strengthening activities with cueing for form. Plantarflexion of the ankle most painful/limited due to compression of the joint space on the spur at his heel.  Overall, Robert demonstrated a positive response to today's session as he reported pain relief in his heel and was able to tolerate supine ex today on the mat.    Robert Is progressing well towards his goals.   Pt prognosis is Good.     Pt will continue to benefit from skilled outpatient physical therapy to address the deficits listed in the problem list box on initial evaluation, provide pt/family education and to maximize pt's level of independence in the home and community environment.     Pt's spiritual, cultural and educational needs considered and pt agreeable to plan of care and goals.     Anticipated barriers to physical therapy:   Transportation and HEP compliance    Goals:  Short Term Goals:    1.Pt to be educated on HEP. MET  2.Patient to demo increased strength R LE by 1/2 grade to improve functional tasks. PROGRESSING, NOT MET  3.Patient to have decreased pain to 5/10 to improve quality of movement. PROGRESSING, NOT MET  4.Patient to improve score on the FOTO  by 10%. PROGRESSING, NOT MET     Long Term Goals:  1. Patient to perform daily activities including prolonged walking without limitation. PROGRESSING, NOT MET  2. Patient to demonstrate increased R LE and core/hip/pelvis strength to 5/5 to improve functional tasks. PROGRESSING, NOT MET  4. Patient to have decreased pain to 2/10 to improve quality of movement. PROGRESSING, NOT MET  5. Patient to improve score on the FOTO by 20%. PROGRESSING, NOT MET       Plan   Plan of care Certification: 4/7/22 to 6/7/22     Outpatient Physical Therapy 2 times weekly for 8 weeks to include the following interventions: Cervical/Lumbar Traction, Electrical Stimulation TENS, Manual Therapy, Moist Heat/ Ice, Neuromuscular Re-ed, Patient Education, Self Care, Therapeutic Activities and Therapeutic Exercise.     Sneha Lott, PTA

## 2022-04-26 ENCOUNTER — PATIENT MESSAGE (OUTPATIENT)
Dept: FAMILY MEDICINE | Facility: CLINIC | Age: 57
End: 2022-04-26
Payer: MEDICAID

## 2022-04-27 ENCOUNTER — PATIENT MESSAGE (OUTPATIENT)
Dept: OTOLARYNGOLOGY | Facility: CLINIC | Age: 57
End: 2022-04-27
Payer: MEDICAID

## 2022-05-08 ENCOUNTER — PATIENT MESSAGE (OUTPATIENT)
Dept: OTOLARYNGOLOGY | Facility: CLINIC | Age: 57
End: 2022-05-08
Payer: MEDICAID

## 2022-05-11 ENCOUNTER — HOSPITAL ENCOUNTER (OUTPATIENT)
Dept: PREADMISSION TESTING | Facility: HOSPITAL | Age: 57
Discharge: HOME | End: 2022-05-11
Attending: INTERNAL MEDICINE
Payer: MEDICAID

## 2022-05-11 DIAGNOSIS — D50.9 IRON DEFICIENCY ANEMIA, UNSPECIFIED IRON DEFICIENCY ANEMIA TYPE: ICD-10-CM

## 2022-05-23 ENCOUNTER — PATIENT MESSAGE (OUTPATIENT)
Dept: OTOLARYNGOLOGY | Facility: CLINIC | Age: 57
End: 2022-05-23
Payer: MEDICAID

## 2022-05-23 ENCOUNTER — PATIENT MESSAGE (OUTPATIENT)
Dept: PREADMISSION TESTING | Facility: HOSPITAL | Age: 57
End: 2022-05-23
Payer: MEDICAID

## 2022-05-23 ENCOUNTER — PATIENT MESSAGE (OUTPATIENT)
Dept: FAMILY MEDICINE | Facility: CLINIC | Age: 57
End: 2022-05-23
Payer: MEDICAID

## 2022-05-24 ENCOUNTER — TELEPHONE (OUTPATIENT)
Dept: FAMILY MEDICINE | Facility: CLINIC | Age: 57
End: 2022-05-24
Payer: MEDICAID

## 2022-05-24 DIAGNOSIS — E66.01 MORBID OBESITY WITH BMI OF 50.0-59.9, ADULT: ICD-10-CM

## 2022-05-24 DIAGNOSIS — I10 PRIMARY HYPERTENSION: Primary | ICD-10-CM

## 2022-05-24 DIAGNOSIS — D50.9 IRON DEFICIENCY ANEMIA, UNSPECIFIED IRON DEFICIENCY ANEMIA TYPE: ICD-10-CM

## 2022-06-03 ENCOUNTER — LAB VISIT (OUTPATIENT)
Dept: LAB | Facility: HOSPITAL | Age: 57
End: 2022-06-03
Payer: MEDICAID

## 2022-06-03 ENCOUNTER — LAB VISIT (OUTPATIENT)
Dept: LAB | Facility: HOSPITAL | Age: 57
End: 2022-06-03
Attending: FAMILY MEDICINE
Payer: MEDICAID

## 2022-06-03 DIAGNOSIS — E66.01 MORBID OBESITY WITH BMI OF 50.0-59.9, ADULT: ICD-10-CM

## 2022-06-03 DIAGNOSIS — D50.9 IRON DEFICIENCY ANEMIA, UNSPECIFIED IRON DEFICIENCY ANEMIA TYPE: ICD-10-CM

## 2022-06-03 DIAGNOSIS — I10 PRIMARY HYPERTENSION: ICD-10-CM

## 2022-06-03 LAB
ALBUMIN/CREAT UR: 3.3 UG/MG (ref 0–30)
CHOLEST SERPL-MCNC: 119 MG/DL (ref 120–199)
CHOLEST/HDLC SERPL: 4.1 {RATIO} (ref 2–5)
CREAT UR-MCNC: 212 MG/DL (ref 23–375)
HDLC SERPL-MCNC: 29 MG/DL (ref 40–75)
HDLC SERPL: 24.4 % (ref 20–50)
LDLC SERPL CALC-MCNC: 71.4 MG/DL (ref 63–159)
MICROALBUMIN UR DL<=1MG/L-MCNC: 7 UG/ML
NONHDLC SERPL-MCNC: 90 MG/DL
TRIGL SERPL-MCNC: 93 MG/DL (ref 30–150)

## 2022-06-03 PROCEDURE — 36415 COLL VENOUS BLD VENIPUNCTURE: CPT | Mod: PO | Performed by: FAMILY MEDICINE

## 2022-06-03 PROCEDURE — 82043 UR ALBUMIN QUANTITATIVE: CPT | Performed by: FAMILY MEDICINE

## 2022-06-03 PROCEDURE — 80061 LIPID PANEL: CPT | Performed by: FAMILY MEDICINE

## 2022-06-03 PROCEDURE — 82570 ASSAY OF URINE CREATININE: CPT | Performed by: FAMILY MEDICINE

## 2022-06-05 ENCOUNTER — PATIENT MESSAGE (OUTPATIENT)
Dept: FAMILY MEDICINE | Facility: CLINIC | Age: 57
End: 2022-06-05
Payer: MEDICAID

## 2022-06-05 DIAGNOSIS — R09.81 CHRONIC NASAL CONGESTION: ICD-10-CM

## 2022-06-09 ENCOUNTER — HOSPITAL ENCOUNTER (OUTPATIENT)
Dept: RADIOLOGY | Facility: HOSPITAL | Age: 57
Discharge: HOME | End: 2022-06-09
Attending: PODIATRIST
Payer: MEDICAID

## 2022-06-09 ENCOUNTER — OFFICE VISIT (OUTPATIENT)
Dept: PODIATRY | Facility: CLINIC | Age: 57
End: 2022-06-09
Payer: MEDICAID

## 2022-06-09 ENCOUNTER — PATIENT MESSAGE (OUTPATIENT)
Dept: FAMILY MEDICINE | Facility: CLINIC | Age: 57
End: 2022-06-09
Payer: MEDICAID

## 2022-06-09 DIAGNOSIS — M77.31 BONE SPUR OF POSTERIOR PORTION OF RIGHT CALCANEUS: ICD-10-CM

## 2022-06-09 DIAGNOSIS — R60.0 BILATERAL EDEMA OF LOWER EXTREMITY: Primary | ICD-10-CM

## 2022-06-09 DIAGNOSIS — M76.61 ACHILLES TENDINITIS OF RIGHT LOWER EXTREMITY: ICD-10-CM

## 2022-06-09 DIAGNOSIS — M92.61 HAGLUND'S DEFORMITY, RIGHT: ICD-10-CM

## 2022-06-09 DIAGNOSIS — E66.01 MORBID OBESITY WITH BODY MASS INDEX (BMI) GREATER THAN OR EQUAL TO 50: ICD-10-CM

## 2022-06-09 DIAGNOSIS — M92.61 HAGLUND'S DEFORMITY, RIGHT: Primary | ICD-10-CM

## 2022-06-09 PROCEDURE — 99212 OFFICE O/P EST SF 10 MIN: CPT | Mod: PBBFAC | Performed by: PODIATRIST

## 2022-06-09 PROCEDURE — 3066F NEPHROPATHY DOC TX: CPT | Mod: CPTII,,, | Performed by: PODIATRIST

## 2022-06-09 PROCEDURE — 99999 PR PBB SHADOW E&M-EST. PATIENT-LVL II: ICD-10-PCS | Mod: PBBFAC,,, | Performed by: PODIATRIST

## 2022-06-09 PROCEDURE — 1160F RVW MEDS BY RX/DR IN RCRD: CPT | Mod: CPTII,,, | Performed by: PODIATRIST

## 2022-06-09 PROCEDURE — 3044F HG A1C LEVEL LT 7.0%: CPT | Mod: CPTII,,, | Performed by: PODIATRIST

## 2022-06-09 PROCEDURE — 4010F PR ACE/ARB THEARPY RXD/TAKEN: ICD-10-PCS | Mod: CPTII,,, | Performed by: PODIATRIST

## 2022-06-09 PROCEDURE — 73630 X-RAY EXAM OF FOOT: CPT | Mod: TC,RT

## 2022-06-09 PROCEDURE — 99999 PR PBB SHADOW E&M-EST. PATIENT-LVL II: CPT | Mod: PBBFAC,,, | Performed by: PODIATRIST

## 2022-06-09 PROCEDURE — 4010F ACE/ARB THERAPY RXD/TAKEN: CPT | Mod: CPTII,,, | Performed by: PODIATRIST

## 2022-06-09 PROCEDURE — 1159F MED LIST DOCD IN RCRD: CPT | Mod: CPTII,,, | Performed by: PODIATRIST

## 2022-06-09 PROCEDURE — 3061F NEG MICROALBUMINURIA REV: CPT | Mod: CPTII,,, | Performed by: PODIATRIST

## 2022-06-09 PROCEDURE — 73630 XR FOOT COMPLETE 3 VIEW RIGHT: ICD-10-PCS | Mod: 26,RT,, | Performed by: RADIOLOGY

## 2022-06-09 PROCEDURE — 1160F PR REVIEW ALL MEDS BY PRESCRIBER/CLIN PHARMACIST DOCUMENTED: ICD-10-PCS | Mod: CPTII,,, | Performed by: PODIATRIST

## 2022-06-09 PROCEDURE — 99214 OFFICE O/P EST MOD 30 MIN: CPT | Mod: S$PBB,,, | Performed by: PODIATRIST

## 2022-06-09 PROCEDURE — 73630 X-RAY EXAM OF FOOT: CPT | Mod: 26,RT,, | Performed by: RADIOLOGY

## 2022-06-09 PROCEDURE — 3061F PR NEG MICROALBUMINURIA RESULT DOCUMENTED/REVIEW: ICD-10-PCS | Mod: CPTII,,, | Performed by: PODIATRIST

## 2022-06-09 PROCEDURE — 3066F PR DOCUMENTATION OF TREATMENT FOR NEPHROPATHY: ICD-10-PCS | Mod: CPTII,,, | Performed by: PODIATRIST

## 2022-06-09 PROCEDURE — 3044F PR MOST RECENT HEMOGLOBIN A1C LEVEL <7.0%: ICD-10-PCS | Mod: CPTII,,, | Performed by: PODIATRIST

## 2022-06-09 PROCEDURE — 1159F PR MEDICATION LIST DOCUMENTED IN MEDICAL RECORD: ICD-10-PCS | Mod: CPTII,,, | Performed by: PODIATRIST

## 2022-06-09 PROCEDURE — 99214 PR OFFICE/OUTPT VISIT, EST, LEVL IV, 30-39 MIN: ICD-10-PCS | Mod: S$PBB,,, | Performed by: PODIATRIST

## 2022-06-09 RX ORDER — FERROUS GLUCONATE 324(38)MG
324 TABLET ORAL 2 TIMES DAILY WITH MEALS
Qty: 180 TABLET | Refills: 5 | Status: SHIPPED | OUTPATIENT
Start: 2022-06-09 | End: 2022-11-09 | Stop reason: SDUPTHER

## 2022-06-09 NOTE — PROGRESS NOTES
Subjective:       Patient ID: Robert Munoz is a 57 y.o. male.    Chief Complaint: Tendonitis (Patient complains of 8/10 pain to right AT. He states the pain is constant and never decreases. )      HPI: Robert Munoz presents to the office today, for follow-up concerning right posterior heel/foot. Today, pains are approx. 8/10. States continuation post-static dyskinesia to this area and the plantar heel as well. Does continue 2 limp with gait.States walking and standing causes and/or exacerbates the symptoms.  States having increased pain followed therapy.  States this is constant in nature.  Relates that with increased activity ambulation, he does have some relief.  Continues to use tobacco daily.  Patient's Primary Care Provider is Alisia Guardado MD.     Review of patient's allergies indicates:  No Known Allergies    Past Medical History:   Diagnosis Date    Hypertension        Family History   Problem Relation Age of Onset    Diabetes Mother     Anuerysm Mother     Heart disease Father        Social History     Socioeconomic History    Marital status:    Tobacco Use    Smoking status: Never Smoker    Smokeless tobacco: Current User     Types: Snuff   Substance and Sexual Activity    Alcohol use: No    Drug use: No    Sexual activity: Yes       Past Surgical History:   Procedure Laterality Date    COLONOSCOPY N/A 10/13/2021    Procedure: COLONOSCOPY;  Surgeon: Eula Dominguez MD;  Location: [facility];  Service: Endoscopy;  Laterality: N/A;       Review of Systems       Objective:   There were no vitals taken for this visit.    X-Ray Foot Complete Right  Narrative: EXAMINATION:  XR FOOT COMPLETE 3 VIEW RIGHT    CLINICAL HISTORY:  . Juvenile osteochondrosis of tarsus, right ankle    TECHNIQUE:  AP, lateral, and oblique views of the right foot were performed.    COMPARISON:  Right calcaneus radiographs from February 3, 2021    FINDINGS:  Moderate hallux valgus deformity with degenerative  changes at the 1st MTP joint.  Moderate multi articular degenerative changes involving the IP joints.  No fracture or dislocation.  Large enthesophyte at the insertion of the right Achilles tendon with adjacent soft tissue swelling noted, unchanged from prior.  Small plantar calcaneal bone spur.  Soft tissue swelling about the right forefoot.  Impression: See above    Electronically signed by: Carlin Colon MD  Date:    06/09/2022  Time:    08:17       Physical Exam  LOWER EXTREMITY PHYSICAL EXAMINATION    ORTHOPEDIC: There is a large palpable retro-calcaneal spur and/or a Laly's deformity noted on the right foot. There is moderate to severely tenderness to palpation of the achilles tendon insertion on to the posterior superior calcaneus. Upon medial to lateral compression of the heel bone at the distal most insertion of the achilles tendon, there is severe discomfort. There is no tenderness to palpation of the plantar medial calcaneal tubercle at the origin of the plantar fascia. Upon palpation of the achilles tendon, there are no defects and/or fusiform swelling noted. There is moderate tenderness to palpation along the course of the achilles insertion to its insertion on the calcaneus. MMT in the sagittal plane with dorsiflexion and plantarflexion is 5/5, as compared to prior. Ankle ROM is not painful and/or creptiant. Equinus is noted. Gait pattern is improved at present.     DERMATOLOGY: There is no noted erythema or cellulitis noted.  Significant in severe edema present to the bilateral lower extremities.  Venous insufficiency present.  No ecchymosis is noted. Skin is supple, dry and intact. There is no palpable bursa noted at the achilles tendon insertion.  Skin is moist.  No ulcerations.  No calluses.     NEUROLOGY: Proprioception is intact, bilateral. Sensation to light touch is intact. Negative Tinel's Sign and negative Valleix sign. No neurological sensations with compression of the area of Borrero's  Nerve in the area of the Abductor Hallucis muscle belly.    Assessment:     1. Bilateral edema of lower extremity    2. Bone spur of posterior portion of right calcaneus    3. Achilles tendinitis of right lower extremity    4. Morbid obesity with body mass index (BMI) greater than or equal to 50          Plan:     Bilateral edema of lower extremity  -     HME - OTHER    Bone spur of posterior portion of right calcaneus    Achilles tendinitis of right lower extremity    Morbid obesity with body mass index (BMI) greater than or equal to 50      Thorough discussion is had with the patient today, concerning the diagnosis, its etiology, and the treatment algorithm at present.    New x-rays were taken today and reviewed by myself patient room.  Appears to be no significant change the size of the bone spur.  There is some thickening present to the most distal insertion of the Achilles tendon with Kager's triangle remaining intact.    Based on patient's minimal results with physical therapy, I would recommend continuation of conservative options.  Unfortunately, patient is at high risk of wound development due to a significantly elevated BMI, +50, with significant and severe edema.  He given this surgical intervention, he has increased risk of wound dehiscence which places him at significantly high risk of healing complications.  Feel is not a surgical candidate this time due to morbid obesity with significant bilateral lower leg edema.  Would recommend continuation of at home stretching.    Would also recommend patient to consider decreasing overall weight in BMI.  Discussed the importance of compression socks to the bilateral lower extremity.  Did provide patient a prescription for lower leg compression.  Recommend 20-30 mmHg compression to be applied daily.  These should be taking off at night.        Future Appointments   Date Time Provider Department Center   6/9/2022  2:45 PM Jack De Leon MD ON [facility]  Medical     8/11/2022  2:30 PM Eliana Balderas MD [facility]   8/31/2022  9:20 AM Alisia Guardado MD [facility]

## 2022-06-13 ENCOUNTER — PATIENT MESSAGE (OUTPATIENT)
Dept: FAMILY MEDICINE | Facility: CLINIC | Age: 57
End: 2022-06-13
Payer: MEDICAID

## 2022-06-13 ENCOUNTER — PATIENT MESSAGE (OUTPATIENT)
Dept: ENDOSCOPY | Facility: HOSPITAL | Age: 57
End: 2022-06-13
Payer: MEDICAID

## 2022-06-13 DIAGNOSIS — R09.81 SINUS CONGESTION: Primary | ICD-10-CM

## 2022-06-13 RX ORDER — AZELASTINE 1 MG/ML
1 SPRAY, METERED NASAL 2 TIMES DAILY
Qty: 30 ML | Refills: 4 | Status: ON HOLD | OUTPATIENT
Start: 2022-06-13 | End: 2022-10-27 | Stop reason: HOSPADM

## 2022-06-13 RX ORDER — FLUTICASONE PROPIONATE 50 MCG
1 SPRAY, SUSPENSION (ML) NASAL DAILY
Qty: 16 G | Refills: 3 | Status: SHIPPED | OUTPATIENT
Start: 2022-06-13 | End: 2022-07-11 | Stop reason: SDUPTHER

## 2022-06-13 RX ORDER — AZELASTINE 1 MG/ML
1 SPRAY, METERED NASAL 2 TIMES DAILY
Qty: 30 ML | Refills: 4 | Status: CANCELLED | OUTPATIENT
Start: 2022-06-13 | End: 2023-06-13

## 2022-06-13 NOTE — TELEPHONE ENCOUNTER
No new care gaps identified.  [facility] Embedded Care Gaps. Reference number: [id number]. 6/13/2022   2:12:02 PM CDT

## 2022-06-23 ENCOUNTER — OFFICE VISIT (OUTPATIENT)
Dept: OTOLARYNGOLOGY | Facility: CLINIC | Age: 57
End: 2022-06-23
Payer: MEDICAID

## 2022-06-23 VITALS — DIASTOLIC BLOOD PRESSURE: 73 MMHG | TEMPERATURE: 99 F | SYSTOLIC BLOOD PRESSURE: 159 MMHG

## 2022-06-23 DIAGNOSIS — J31.0 RHINITIS MEDICAMENTOSA: Primary | ICD-10-CM

## 2022-06-23 DIAGNOSIS — T48.5X5A RHINITIS MEDICAMENTOSA: Primary | ICD-10-CM

## 2022-06-23 DIAGNOSIS — R09.81 CHRONIC NASAL CONGESTION: ICD-10-CM

## 2022-06-23 PROCEDURE — 4010F ACE/ARB THERAPY RXD/TAKEN: CPT | Mod: CPTII,,, | Performed by: PHYSICIAN ASSISTANT

## 2022-06-23 PROCEDURE — 3078F PR MOST RECENT DIASTOLIC BLOOD PRESSURE < 80 MM HG: ICD-10-PCS | Mod: CPTII,,, | Performed by: PHYSICIAN ASSISTANT

## 2022-06-23 PROCEDURE — 99214 OFFICE O/P EST MOD 30 MIN: CPT | Mod: PBBFAC | Performed by: PHYSICIAN ASSISTANT

## 2022-06-23 PROCEDURE — 3066F PR DOCUMENTATION OF TREATMENT FOR NEPHROPATHY: ICD-10-PCS | Mod: CPTII,,, | Performed by: PHYSICIAN ASSISTANT

## 2022-06-23 PROCEDURE — 99203 OFFICE O/P NEW LOW 30 MIN: CPT | Mod: S$PBB,,, | Performed by: PHYSICIAN ASSISTANT

## 2022-06-23 PROCEDURE — 3077F PR MOST RECENT SYSTOLIC BLOOD PRESSURE >= 140 MM HG: ICD-10-PCS | Mod: CPTII,,, | Performed by: PHYSICIAN ASSISTANT

## 2022-06-23 PROCEDURE — 3044F HG A1C LEVEL LT 7.0%: CPT | Mod: CPTII,,, | Performed by: PHYSICIAN ASSISTANT

## 2022-06-23 PROCEDURE — 99999 PR PBB SHADOW E&M-EST. PATIENT-LVL IV: ICD-10-PCS | Mod: PBBFAC,,, | Performed by: PHYSICIAN ASSISTANT

## 2022-06-23 PROCEDURE — 3077F SYST BP >= 140 MM HG: CPT | Mod: CPTII,,, | Performed by: PHYSICIAN ASSISTANT

## 2022-06-23 PROCEDURE — 3066F NEPHROPATHY DOC TX: CPT | Mod: CPTII,,, | Performed by: PHYSICIAN ASSISTANT

## 2022-06-23 PROCEDURE — 99203 PR OFFICE/OUTPT VISIT, NEW, LEVL III, 30-44 MIN: ICD-10-PCS | Mod: S$PBB,,, | Performed by: PHYSICIAN ASSISTANT

## 2022-06-23 PROCEDURE — 3044F PR MOST RECENT HEMOGLOBIN A1C LEVEL <7.0%: ICD-10-PCS | Mod: CPTII,,, | Performed by: PHYSICIAN ASSISTANT

## 2022-06-23 PROCEDURE — 1159F PR MEDICATION LIST DOCUMENTED IN MEDICAL RECORD: ICD-10-PCS | Mod: CPTII,,, | Performed by: PHYSICIAN ASSISTANT

## 2022-06-23 PROCEDURE — 99999 PR PBB SHADOW E&M-EST. PATIENT-LVL IV: CPT | Mod: PBBFAC,,, | Performed by: PHYSICIAN ASSISTANT

## 2022-06-23 PROCEDURE — 3061F NEG MICROALBUMINURIA REV: CPT | Mod: CPTII,,, | Performed by: PHYSICIAN ASSISTANT

## 2022-06-23 PROCEDURE — 3078F DIAST BP <80 MM HG: CPT | Mod: CPTII,,, | Performed by: PHYSICIAN ASSISTANT

## 2022-06-23 PROCEDURE — 1159F MED LIST DOCD IN RCRD: CPT | Mod: CPTII,,, | Performed by: PHYSICIAN ASSISTANT

## 2022-06-23 PROCEDURE — 3061F PR NEG MICROALBUMINURIA RESULT DOCUMENTED/REVIEW: ICD-10-PCS | Mod: CPTII,,, | Performed by: PHYSICIAN ASSISTANT

## 2022-06-23 PROCEDURE — 4010F PR ACE/ARB THEARPY RXD/TAKEN: ICD-10-PCS | Mod: CPTII,,, | Performed by: PHYSICIAN ASSISTANT

## 2022-06-23 RX ORDER — HYDROCHLOROTHIAZIDE 25 MG/1
TABLET ORAL
COMMUNITY
Start: 2022-02-11 | End: 2023-01-23

## 2022-06-23 RX ORDER — LOSARTAN POTASSIUM 100 MG/1
TABLET ORAL
COMMUNITY
Start: 2022-02-11 | End: 2023-01-23

## 2022-06-23 NOTE — PROGRESS NOTES
Answers for HPI/ROS submitted by the patient on 6/22/2022  None of these: Yes  sinus pressure : Yes  Sinus infection(s)?: Yes  None of these : Yes  Snoring?: Yes  None of these : Yes  None of these: Yes  None of these: Yes  back pain: Yes  None of these : Yes  Seasonal Allergies?: Yes  None of these : Yes  None of these: Yes  None of these: Yes  sleep disturbance: Yes

## 2022-06-23 NOTE — PROGRESS NOTES
Subjective:       Patient ID: Robert Munoz is a 57 y.o. male.    Chief Complaint: Sinus Problem (Nasal congestion, suffering for 3 years..)    Patient is a pleasant 57 year old gentleman here to see me today for the first time for evaluation of chronic nasal congestion/obstruction over past 3+ years.      Bilateral; denies having one nostril worse.  Wakes with nasal obstruction and must use decongestant spray to open his nose before he can fall back asleep  Occasional sinus pressure but denies sinus pain  Nasal drainage (clear to band-like); only after using decongestant nasal spray  Postnasal drainage - no  Sneezing - no  Itchy, watery eyes - no  Anosmia or hyposmia - no    Snoring - yes; no witnessed apnea; has not had sleep study    Reflux - no    Smoker - no but does use chewing tobacco    Tried Flonase in past, not more than 3 weeks but stopped when it did not provide relief like decongestant spray; recently prescribed again by PCP;  Tried Astelin (stopped because it affected his taste) -  no significant relief with either  ONLY gets relief with OTC decongestant nasal sprays - has been using for over one year (multiple times daily)  No previous allergy testing - no  No previous sinus surgery - no  Nasal trauma - no    Recurrent sinus infections - not usually    Has high blood pressure; reports it's fairly well controlled when on his medications.  He has multiple medications listed today but says he's only taking two of them but is unsure which ones.  He also mentioned taking one of them Y1isqwk instead of Q12hr as prescribed but is unsure which one (initially said Nifedipine).        Review of Systems   Constitutional: Negative.  Negative for fever.   HENT: Positive for nasal congestion, rhinorrhea (only after using decongestant sprays) and sinus pressure/congestion (chronic - bilateral). Negative for ear discharge, ear pain, nosebleeds, postnasal drip, sneezing and sore throat.    Eyes: Negative.     Respiratory: Negative for cough and choking.    Cardiovascular: Negative.    Gastrointestinal: Negative.  Negative for reflux.   Endocrine: Negative.    Genitourinary: Negative.    Musculoskeletal: Positive for back pain.   Integumentary:  Negative.   Allergic/Immunologic: Positive for environmental allergies.   Neurological: Negative.  Negative for dizziness and headaches.   Hematological: Negative.    Psychiatric/Behavioral: Positive for sleep disturbance.         Objective:      Physical Exam  Vitals reviewed.   Constitutional:       General: He is not in acute distress.     Appearance: He is well-developed.   HENT:      Head: Normocephalic and atraumatic.      Jaw: No trismus.      Right Ear: Tympanic membrane, ear canal and external ear normal. No drainage. No middle ear effusion. Tympanic membrane is not injected, erythematous or retracted.      Left Ear: Tympanic membrane, ear canal and external ear normal. No drainage.  No middle ear effusion. Tympanic membrane is not injected, erythematous or retracted.      Nose: Nose normal. No nasal deformity, septal deviation, mucosal edema or rhinorrhea.      Right Turbinates: Not enlarged.      Left Turbinates: Not enlarged.      Right Sinus: No maxillary sinus tenderness or frontal sinus tenderness.      Left Sinus: No maxillary sinus tenderness or frontal sinus tenderness.      Comments: Able to see head of middle turbinates with no purulent drainage visualized       Mouth/Throat:      Mouth: Mucous membranes are moist.      Dentition: Normal dentition.      Pharynx: Uvula midline. No oropharyngeal exudate or uvula swelling.      Comments: Large base of tongue  Eyes:      General: No scleral icterus.     Conjunctiva/sclera: Conjunctivae normal.      Right eye: Right conjunctiva is not injected. No chemosis.     Left eye: Left conjunctiva is not injected. No chemosis.     Pupils: Pupils are equal, round, and reactive to light.   Neck:      Trachea: Trachea and  phonation normal. No tracheal tenderness or tracheal deviation.   Pulmonary:      Effort: Pulmonary effort is normal. No accessory muscle usage or respiratory distress.      Breath sounds: No stridor.   Lymphadenopathy:      Head:      Right side of head: No submental, submandibular, preauricular or posterior auricular adenopathy.      Left side of head: No submental, submandibular, preauricular or posterior auricular adenopathy.      Cervical: No cervical adenopathy.      Right cervical: No superficial or deep cervical adenopathy.     Left cervical: No superficial or deep cervical adenopathy.   Skin:     General: Skin is warm and dry.      Findings: No erythema or rash.   Neurological:      Mental Status: He is alert and oriented to person, place, and time.      Cranial Nerves: No cranial nerve deficit.   Psychiatric:         Behavior: Behavior normal. Behavior is cooperative.         Thought Content: Thought content normal.         Assessment:       Problem List Items Addressed This Visit    None     Visit Diagnoses     Rhinitis medicamentosa    -  Primary    Chronic nasal congestion              Plan:           Discussed RHINITIS MEDICAMENTOSA and rebound congestion with use of decongestant nasal sprays greater than 3 days.  He's been using now for over one year, multiple times daily.  Discussed weaning off.  Buy a new bottle of afrin and replace half of it with sterile water or saline.  Use this for 1 week then pour out half of the bottle and again refill half with sterile water or saline.  Repeat this process for 4 weeks.  Will schedule him to RTC in 3 weeks with MD for nasal endoscopy to evaluate for any other cause of nasal obstruction. I would not recommend oral steroids at this time due to his HTN.     Continue Flonase in the interim. We discussed in detail the proper mechanism of use directing the spray away from the nasal septum.  In addition, we also discussed that it will take two to three weeks of  "daily use to achieve maximal effectiveness.  Depending on results of his nasal endoscopy, he may benefit from referral to allergy.    Also advised the patient to bring in his actual medications at his next visit to assess/document what medications he's actually taking and the frequency.  He initially told my nurse today that he's taking Nifedipine Q6hr instead of Q12 to help alleviate nasal congestion but upon further questioning, he's confused and reports it's just a "white pill" that he's taking different than prescribed.  I discussed the risks involved with taking medications other than as prescribed, especially cardiac medications which can lead to arrhythmias, blood pressure fluctuations and even death.  He voiced understanding and states he will try to email me his actual medication list/frequency of dosages once he's home looking at the bottles.        Answers for HPI/ROS submitted by the patient on 6/22/2022  Sinus infection(s)?: Yes  Snoring?: Yes  Seasonal Allergies?: Yes      "

## 2022-06-23 NOTE — PATIENT INSTRUCTIONS
-RHINITIS MEDICAMENTOSA-  Buy a new bottle of afrin and replace half of it with sterile water or saline.  Use this for 1 week then pour out half of the bottle and again refill half with sterile water or saline.  Repeat this process for 4 weeks.    Continue Flonase (EVERYDAY) - spray both nostrils    Keep scheduled followup for nasal endoscopy

## 2022-07-11 ENCOUNTER — PATIENT MESSAGE (OUTPATIENT)
Dept: ENDOSCOPY | Facility: HOSPITAL | Age: 57
End: 2022-07-11

## 2022-07-11 ENCOUNTER — PATIENT MESSAGE (OUTPATIENT)
Dept: PAIN MEDICINE | Facility: CLINIC | Age: 57
End: 2022-07-11

## 2022-07-11 ENCOUNTER — PATIENT MESSAGE (OUTPATIENT)
Dept: FAMILY MEDICINE | Facility: CLINIC | Age: 57
End: 2022-07-11

## 2022-07-11 ENCOUNTER — PATIENT MESSAGE (OUTPATIENT)
Dept: PODIATRY | Facility: CLINIC | Age: 57
End: 2022-07-11

## 2022-07-11 NOTE — TELEPHONE ENCOUNTER
CARLOS for pt in regards to him missing an appointment want wanting to reschedule. However the only appointment I see that he missed on that day is with different providers.

## 2022-07-20 ENCOUNTER — PATIENT MESSAGE (OUTPATIENT)
Dept: OTOLARYNGOLOGY | Facility: CLINIC | Age: 57
End: 2022-07-20

## 2022-07-20 ENCOUNTER — PATIENT MESSAGE (OUTPATIENT)
Dept: PAIN MEDICINE | Facility: CLINIC | Age: 57
End: 2022-07-20

## 2022-07-25 ENCOUNTER — OFFICE VISIT (OUTPATIENT)
Dept: OTOLARYNGOLOGY | Facility: CLINIC | Age: 57
End: 2022-07-25
Payer: MEDICAID

## 2022-07-25 VITALS — BODY MASS INDEX: 50.37 KG/M2 | WEIGHT: 315 LBS

## 2022-07-25 DIAGNOSIS — T48.5X5A RHINITIS MEDICAMENTOSA: Primary | ICD-10-CM

## 2022-07-25 DIAGNOSIS — J31.0 RHINITIS MEDICAMENTOSA: Primary | ICD-10-CM

## 2022-07-25 PROCEDURE — 4010F ACE/ARB THERAPY RXD/TAKEN: CPT | Mod: CPTII,,, | Performed by: STUDENT IN AN ORGANIZED HEALTH CARE EDUCATION/TRAINING PROGRAM

## 2022-07-25 PROCEDURE — 3008F PR BODY MASS INDEX (BMI) DOCUMENTED: ICD-10-PCS | Mod: CPTII,,, | Performed by: STUDENT IN AN ORGANIZED HEALTH CARE EDUCATION/TRAINING PROGRAM

## 2022-07-25 PROCEDURE — 3008F BODY MASS INDEX DOCD: CPT | Mod: CPTII,,, | Performed by: STUDENT IN AN ORGANIZED HEALTH CARE EDUCATION/TRAINING PROGRAM

## 2022-07-25 PROCEDURE — 99999 PR PBB SHADOW E&M-EST. PATIENT-LVL III: ICD-10-PCS | Mod: PBBFAC,,, | Performed by: STUDENT IN AN ORGANIZED HEALTH CARE EDUCATION/TRAINING PROGRAM

## 2022-07-25 PROCEDURE — 99213 OFFICE O/P EST LOW 20 MIN: CPT | Mod: S$PBB,,, | Performed by: STUDENT IN AN ORGANIZED HEALTH CARE EDUCATION/TRAINING PROGRAM

## 2022-07-25 PROCEDURE — 4010F PR ACE/ARB THEARPY RXD/TAKEN: ICD-10-PCS | Mod: CPTII,,, | Performed by: STUDENT IN AN ORGANIZED HEALTH CARE EDUCATION/TRAINING PROGRAM

## 2022-07-25 PROCEDURE — 3044F HG A1C LEVEL LT 7.0%: CPT | Mod: CPTII,,, | Performed by: STUDENT IN AN ORGANIZED HEALTH CARE EDUCATION/TRAINING PROGRAM

## 2022-07-25 PROCEDURE — 3044F PR MOST RECENT HEMOGLOBIN A1C LEVEL <7.0%: ICD-10-PCS | Mod: CPTII,,, | Performed by: STUDENT IN AN ORGANIZED HEALTH CARE EDUCATION/TRAINING PROGRAM

## 2022-07-25 PROCEDURE — 99213 PR OFFICE/OUTPT VISIT, EST, LEVL III, 20-29 MIN: ICD-10-PCS | Mod: S$PBB,,, | Performed by: STUDENT IN AN ORGANIZED HEALTH CARE EDUCATION/TRAINING PROGRAM

## 2022-07-25 PROCEDURE — 3066F NEPHROPATHY DOC TX: CPT | Mod: CPTII,,, | Performed by: STUDENT IN AN ORGANIZED HEALTH CARE EDUCATION/TRAINING PROGRAM

## 2022-07-25 PROCEDURE — 3066F PR DOCUMENTATION OF TREATMENT FOR NEPHROPATHY: ICD-10-PCS | Mod: CPTII,,, | Performed by: STUDENT IN AN ORGANIZED HEALTH CARE EDUCATION/TRAINING PROGRAM

## 2022-07-25 PROCEDURE — 1159F PR MEDICATION LIST DOCUMENTED IN MEDICAL RECORD: ICD-10-PCS | Mod: CPTII,,, | Performed by: STUDENT IN AN ORGANIZED HEALTH CARE EDUCATION/TRAINING PROGRAM

## 2022-07-25 PROCEDURE — 3061F PR NEG MICROALBUMINURIA RESULT DOCUMENTED/REVIEW: ICD-10-PCS | Mod: CPTII,,, | Performed by: STUDENT IN AN ORGANIZED HEALTH CARE EDUCATION/TRAINING PROGRAM

## 2022-07-25 PROCEDURE — 1159F MED LIST DOCD IN RCRD: CPT | Mod: CPTII,,, | Performed by: STUDENT IN AN ORGANIZED HEALTH CARE EDUCATION/TRAINING PROGRAM

## 2022-07-25 PROCEDURE — 99999 PR PBB SHADOW E&M-EST. PATIENT-LVL III: CPT | Mod: PBBFAC,,, | Performed by: STUDENT IN AN ORGANIZED HEALTH CARE EDUCATION/TRAINING PROGRAM

## 2022-07-25 PROCEDURE — 99213 OFFICE O/P EST LOW 20 MIN: CPT | Mod: PBBFAC | Performed by: STUDENT IN AN ORGANIZED HEALTH CARE EDUCATION/TRAINING PROGRAM

## 2022-07-25 PROCEDURE — 3061F NEG MICROALBUMINURIA REV: CPT | Mod: CPTII,,, | Performed by: STUDENT IN AN ORGANIZED HEALTH CARE EDUCATION/TRAINING PROGRAM

## 2022-07-25 NOTE — PROGRESS NOTES
Chief complaint:    Chief Complaint   Patient presents with    follow up sinus problem     Pt states he is 100% better. States he took a tooth pick and broke something up-shook head and something fell out. Has been fine since.            Referring Provider:  No referring provider defined for this encounter.    History of present illness:     Mr. Munoz is a 57 y.o. presenting for evaluation of sinus issues. Previously seen by Linda Senior PA-C for rhinitis medicamentosa. Symptoms now completely resolved and off of all nasal sprays. Denies obstruction, congestion, facial pressure, anosmia, purulent rhinorrhea.     Did have episode of right ear itching and fullness. Used q-tips with some pain about a couple weeks ago. Used H2O2. Now resolved. Denies otalgia, otorrhea, vertigo, tinnitus      History      Past Medical History:   Past Medical History:   Diagnosis Date    Hypertension          Past Surgical History:  Past Surgical History:   Procedure Laterality Date    COLONOSCOPY N/A 10/13/2021    Procedure: COLONOSCOPY;  Surgeon: Eula Dominguez MD;  Location: [facility];  Service: Endoscopy;  Laterality: N/A;         Medications: Medication list reviewed. He  has a current medication list which includes the following prescription(s): carvedilol, ferrous gluconate, fluticasone propionate, hydrochlorothiazide, suboxone, valsartan-hydrochlorothiazide, azelastine, levocetirizine, losartan, and nifedipine.     Allergies: Review of patient's allergies indicates:  No Known Allergies      Family history: family history includes Anuerysm in his mother; Diabetes in his mother; Heart disease in his father.         Social History          Alcohol use:  reports no history of alcohol use.            Tobacco:  reports that he has never smoked. His smokeless tobacco use includes snuff.         Physical Examination      Vitals: Weight (!) 163.8 kg (361 lb 1.8 oz).      General: Well developed, well nourished, well  hydrated.     Voice: no dysphonia, no dysarthria      Head/Face: Normocephalic, atraumatic. No scars or lesions. Facial musculature equal.     Eyes: No scleral icterus or conjunctival hemorrhage. EOMI. PERRLA.     Ears:     · Right ear: No gross deformity. EAC is clear of debris and erythema. TM are intact with a pneumatized middle ear. No signs of retraction, fluid or infection.      · Left ear: No gross deformity. EAC is clear of debris and erythema. TM are intact with a pneumatized middle ear. No signs of retraction, fluid or infection.      Nose: No gross deformity or lesions. No purulent discharge. No significant NSD.     Mouth/Oropharynx: Lips without any lesions. No mucosal lesions within the oropharynx. No tonsillar exudate or lesions. Pharyngeal walls symmetrical. Uvula midline. Tongue midline without lesions.     Neurologic: Moving all extremities without gross abnormality.CN II-XII grossly intact. House-Brackmann 1/6. No signs of nystagmus.          Data reviewed      Review of records:      I reviewed records from the referring provider's office visits describing the history, workup, and/or treatment of this problem thus far.         Assessment/Plan:    Rhinitis medicamentosa  - resolved    Ears clear today. Ear symptoms resolved.     Return to clinic yusra Thao MD  [facility]   [facility] [address]  [place] [address]  P: [phone]  F: [phone]

## 2022-08-12 DIAGNOSIS — D50.9 IRON DEFICIENCY ANEMIA, UNSPECIFIED IRON DEFICIENCY ANEMIA TYPE: Primary | ICD-10-CM

## 2022-08-20 ENCOUNTER — PATIENT MESSAGE (OUTPATIENT)
Dept: ENDOSCOPY | Facility: HOSPITAL | Age: 57
End: 2022-08-20

## 2022-08-20 ENCOUNTER — PATIENT MESSAGE (OUTPATIENT)
Dept: PAIN MEDICINE | Facility: CLINIC | Age: 57
End: 2022-08-20

## 2022-08-20 ENCOUNTER — PATIENT MESSAGE (OUTPATIENT)
Dept: FAMILY MEDICINE | Facility: CLINIC | Age: 57
End: 2022-08-20

## 2022-09-12 ENCOUNTER — HOSPITAL ENCOUNTER (OUTPATIENT)
Dept: PREADMISSION TESTING | Facility: HOSPITAL | Age: 57
Discharge: HOME | End: 2022-09-12
Attending: INTERNAL MEDICINE
Payer: MEDICAID

## 2022-09-12 DIAGNOSIS — D50.9 IRON DEFICIENCY ANEMIA, UNSPECIFIED IRON DEFICIENCY ANEMIA TYPE: Primary | ICD-10-CM

## 2022-10-09 ENCOUNTER — PATIENT MESSAGE (OUTPATIENT)
Dept: FAMILY MEDICINE | Facility: CLINIC | Age: 57
End: 2022-10-09

## 2022-10-10 ENCOUNTER — PATIENT MESSAGE (OUTPATIENT)
Dept: PAIN MEDICINE | Facility: CLINIC | Age: 57
End: 2022-10-10

## 2022-10-10 ENCOUNTER — TELEPHONE (OUTPATIENT)
Dept: OTOLARYNGOLOGY | Facility: CLINIC | Age: 57
End: 2022-10-10

## 2022-10-10 ENCOUNTER — TELEPHONE (OUTPATIENT)
Dept: PAIN MEDICINE | Facility: CLINIC | Age: 57
End: 2022-10-10

## 2022-10-12 DIAGNOSIS — D50.9 IRON DEFICIENCY ANEMIA, UNSPECIFIED IRON DEFICIENCY ANEMIA TYPE: Primary | ICD-10-CM

## 2022-10-13 ENCOUNTER — HOSPITAL ENCOUNTER (OUTPATIENT)
Dept: RADIOLOGY | Facility: HOSPITAL | Age: 57
Discharge: HOME | End: 2022-10-13
Attending: FAMILY MEDICINE
Payer: MEDICAID

## 2022-10-13 ENCOUNTER — OFFICE VISIT (OUTPATIENT)
Dept: FAMILY MEDICINE | Facility: CLINIC | Age: 57
End: 2022-10-13
Payer: MEDICAID

## 2022-10-13 ENCOUNTER — PATIENT MESSAGE (OUTPATIENT)
Dept: FAMILY MEDICINE | Facility: CLINIC | Age: 57
End: 2022-10-13

## 2022-10-13 VITALS
DIASTOLIC BLOOD PRESSURE: 68 MMHG | OXYGEN SATURATION: 94 % | BODY MASS INDEX: 44.1 KG/M2 | HEIGHT: 71 IN | TEMPERATURE: 98 F | HEART RATE: 79 BPM | WEIGHT: 315 LBS | SYSTOLIC BLOOD PRESSURE: 112 MMHG

## 2022-10-13 DIAGNOSIS — J45.21 MILD INTERMITTENT REACTIVE AIRWAY DISEASE WITH WHEEZING WITH ACUTE EXACERBATION: Primary | ICD-10-CM

## 2022-10-13 DIAGNOSIS — I10 PRIMARY HYPERTENSION: ICD-10-CM

## 2022-10-13 DIAGNOSIS — J45.21 MILD INTERMITTENT REACTIVE AIRWAY DISEASE WITH WHEEZING WITH ACUTE EXACERBATION: ICD-10-CM

## 2022-10-13 PROCEDURE — 3074F PR MOST RECENT SYSTOLIC BLOOD PRESSURE < 130 MM HG: ICD-10-PCS | Mod: CPTII,,, | Performed by: FAMILY MEDICINE

## 2022-10-13 PROCEDURE — 99214 PR OFFICE/OUTPT VISIT, EST, LEVL IV, 30-39 MIN: ICD-10-PCS | Mod: S$PBB,,, | Performed by: FAMILY MEDICINE

## 2022-10-13 PROCEDURE — 99999 PR PBB SHADOW E&M-EST. PATIENT-LVL III: CPT | Mod: PBBFAC,,, | Performed by: FAMILY MEDICINE

## 2022-10-13 PROCEDURE — 3061F PR NEG MICROALBUMINURIA RESULT DOCUMENTED/REVIEW: ICD-10-PCS | Mod: CPTII,,, | Performed by: FAMILY MEDICINE

## 2022-10-13 PROCEDURE — 99999 PR PBB SHADOW E&M-EST. PATIENT-LVL III: ICD-10-PCS | Mod: PBBFAC,,, | Performed by: FAMILY MEDICINE

## 2022-10-13 PROCEDURE — 3044F HG A1C LEVEL LT 7.0%: CPT | Mod: CPTII,,, | Performed by: FAMILY MEDICINE

## 2022-10-13 PROCEDURE — 99213 OFFICE O/P EST LOW 20 MIN: CPT | Mod: PBBFAC,PO,25 | Performed by: FAMILY MEDICINE

## 2022-10-13 PROCEDURE — 3078F PR MOST RECENT DIASTOLIC BLOOD PRESSURE < 80 MM HG: ICD-10-PCS | Mod: CPTII,,, | Performed by: FAMILY MEDICINE

## 2022-10-13 PROCEDURE — 4010F ACE/ARB THERAPY RXD/TAKEN: CPT | Mod: CPTII,,, | Performed by: FAMILY MEDICINE

## 2022-10-13 PROCEDURE — 4010F PR ACE/ARB THEARPY RXD/TAKEN: ICD-10-PCS | Mod: CPTII,,, | Performed by: FAMILY MEDICINE

## 2022-10-13 PROCEDURE — 3044F PR MOST RECENT HEMOGLOBIN A1C LEVEL <7.0%: ICD-10-PCS | Mod: CPTII,,, | Performed by: FAMILY MEDICINE

## 2022-10-13 PROCEDURE — 71046 X-RAY EXAM CHEST 2 VIEWS: CPT | Mod: 26,,, | Performed by: RADIOLOGY

## 2022-10-13 PROCEDURE — 99214 OFFICE O/P EST MOD 30 MIN: CPT | Mod: S$PBB,,, | Performed by: FAMILY MEDICINE

## 2022-10-13 PROCEDURE — 3074F SYST BP LT 130 MM HG: CPT | Mod: CPTII,,, | Performed by: FAMILY MEDICINE

## 2022-10-13 PROCEDURE — 3066F PR DOCUMENTATION OF TREATMENT FOR NEPHROPATHY: ICD-10-PCS | Mod: CPTII,,, | Performed by: FAMILY MEDICINE

## 2022-10-13 PROCEDURE — 3061F NEG MICROALBUMINURIA REV: CPT | Mod: CPTII,,, | Performed by: FAMILY MEDICINE

## 2022-10-13 PROCEDURE — 71046 XR CHEST PA AND LATERAL: ICD-10-PCS | Mod: 26,,, | Performed by: RADIOLOGY

## 2022-10-13 PROCEDURE — 71046 X-RAY EXAM CHEST 2 VIEWS: CPT | Mod: TC,FY,PO

## 2022-10-13 PROCEDURE — 3078F DIAST BP <80 MM HG: CPT | Mod: CPTII,,, | Performed by: FAMILY MEDICINE

## 2022-10-13 PROCEDURE — 3066F NEPHROPATHY DOC TX: CPT | Mod: CPTII,,, | Performed by: FAMILY MEDICINE

## 2022-10-13 RX ORDER — PREDNISONE 20 MG/1
20 TABLET ORAL SEE ADMIN INSTRUCTIONS
Qty: 37 TABLET | Refills: 0 | Status: SHIPPED | OUTPATIENT
Start: 2022-10-13 | End: 2023-01-23

## 2022-10-13 RX ORDER — NIFEDIPINE 30 MG/1
30 TABLET, EXTENDED RELEASE ORAL NIGHTLY
Qty: 30 TABLET | Refills: 11 | Status: SHIPPED | OUTPATIENT
Start: 2022-10-13 | End: 2023-02-20

## 2022-10-13 RX ORDER — CARVEDILOL 6.25 MG/1
6.25 TABLET ORAL 2 TIMES DAILY WITH MEALS
Qty: 60 TABLET | Refills: 5 | Status: SHIPPED | OUTPATIENT
Start: 2022-10-13 | End: 2023-02-20

## 2022-10-13 RX ORDER — ALBUTEROL SULFATE 90 UG/1
2 AEROSOL, METERED RESPIRATORY (INHALATION) EVERY 4 HOURS PRN
Qty: 18 G | Refills: 2 | Status: SHIPPED | OUTPATIENT
Start: 2022-10-13 | End: 2023-02-21 | Stop reason: SDUPTHER

## 2022-10-13 NOTE — PROGRESS NOTES
Chief Complaint:    Chief Complaint   Patient presents with    Wheezing       History of Present Illness:  10/13/2022:-  Patient presents today for wheezing for 1.5 months.     Worsens at night. He can't lay down flat at night so he sleeps sitting up. Reports associated asthma and R tonsil pain. Denies sinus pain.  No diagnosis of asthma. Wheezing can also occur sometimes with walking or exertion.   He hasn't been getting his back injections recently due to time, money.   Blood pressure does well at home.       04/19/2022:-  Patient with a hx of right Ally's deformity, weakness of right lower extremity, tobacco use, and gait abnormality for a follow up.     Reports sinus congestion. Has been having sinus problems for over a year. No relief with flonase and astelin. Has appt with Dr. Eliana Balderas on 05/25.  His leg swelling has gone down. Started Diovan-HCT. Discontinued losartan.       03/30/2022:-  Patient with a hx of right Ally's deformity, weakness of right lower extremity, tobacco use, and gait abnormality presents today for hypertension     Seeing podiatry for his right heel.   Reports congestion. Denies sinus pain. Needs refill on Flonase. Wants to see ENT. Sneezes a lot.   Systolic can get up to 160 at home. Didn't take his medicine this morning.   Reports leg swelling. Get blisters on his legs. Using Noxzema on his legs.       10/18/2021:-  He is here for a 2 week follow-up for high blood pressure. Weight is down 4 lbs. Patient has gotten colonoscopy done. He has been watching diet.    He would also like to schedule removal cyst on the back of scalp.      10/04/2021:-  He is here for follow-up he says his blood pressure been running high daily ever since he had a 2nd COVID shot.  He has been seeing physical therapy for his heel pain and did a few sessions that made his back pain worse so he could go anymore but he says he might be doing going there again.  He has a lump on the back of his scalp which  seems to be growing in size although it is not tender.  He will be going back to Physical Medicine for injection of the back.      05/19/2021:-  He is having lot of pain in his heel he has done some exercises but is not helping it hurts initially and then starts to get better as he walks.      02/03/2021:-  Complaining of right heel pain he said this is a chronic problem hurts to walk injury other than some injury as a child  Still has back pain recently nerve conduction study which was possibly not suggestive of lumbar radiculopathy but the test was limited by body habitus.      12/23/2020:-  He is here for follow-up he is waiting to get a nerve conduction study on his lower extremity  Labs reveal that he has prediabetic  Also revealed mild anemia and mildly elevated ALT denies alcohol use he is morbidly obese however.  He has a history abuse of decongestant nasally which he quit doing it but he is having difficulty with the use of Flonase and is complaining of constant congestion.    11/17/2020:-  Patient is new to the practice  He says he suffers with chronic back pain for a number years he describes the pain as in the low back and slight activity for example washing dishes or lawn mowing be backing or slight walking causes severe pain in the back and his legs get weak feels like he is about to fall down and gives out any strength in his legs.  Denies any tingling or numbness he describes the pain as does radiating to the legs specially with exertion.  He has not had any testing done other than just x-ray and has been sent for physical therapy at least few times which he says has not done him any good.  He says he is unable to work like this he that he wants his back fixed or he wants to go on disability.    He is also being treated for hypertension  Never had a colonoscopy      ROS:  Review of Systems   Constitutional:  Negative for appetite change, chills and fever.   HENT:  Negative for congestion, ear pain,  "postnasal drip, rhinorrhea, sinus pressure and sinus pain.    Eyes:  Negative for pain.   Respiratory:  Positive for cough and wheezing. Negative for chest tightness and shortness of breath.    Cardiovascular:  Negative for chest pain and palpitations.   Gastrointestinal:  Negative for abdominal pain, blood in stool, constipation, diarrhea and nausea.   Genitourinary:  Negative for difficulty urinating, dysuria, flank pain and hematuria.   Musculoskeletal:  Negative for arthralgias and myalgias.   Skin:  Negative for pallor and wound.   Neurological:  Negative for dizziness, tremors, speech difficulty, light-headedness and headaches.   Psychiatric/Behavioral:  Negative for behavioral problems, dysphoric mood and sleep disturbance. The patient is not nervous/anxious.    All other systems reviewed and are negative.    Past Medical History:   Diagnosis Date    Hypertension        Social History:  Social History     Socioeconomic History    Marital status:    Tobacco Use    Smoking status: Never    Smokeless tobacco: Current     Types: Snuff   Substance and Sexual Activity    Alcohol use: No    Drug use: No    Sexual activity: Yes       Family History:   family history includes Anuerysm in his mother; Diabetes in his mother; Heart disease in his father.    Health Maintenance   Topic Date Due    Lipid Panel  06/03/2027    TETANUS VACCINE  10/04/2031    Hepatitis C Screening  Completed       Physical Exam:    Vital Signs  Temp: 97.9 °F (36.6 °C)  Pulse: 79  SpO2: (!) 94 %  BP: 112/68  Pain Score: 0-No pain  Height and Weight  Height: 5' 11" (180.3 cm)  Weight: (!) 171.8 kg (378 lb 12 oz)  BSA (Calculated - sq m): 2.93 sq meters  BMI (Calculated): 52.8  Weight in (lb) to have BMI = 25: 178.9]    Body mass index is 52.82 kg/m².    Physical Exam  Vitals and nursing note reviewed.   Constitutional:       Appearance: Normal appearance. He is morbidly obese.   HENT:      Head: Normocephalic and atraumatic.      Right " Ear: Tympanic membrane normal.      Left Ear: Tympanic membrane normal.      Nose:      Right Sinus: No maxillary sinus tenderness or frontal sinus tenderness.      Left Sinus: No maxillary sinus tenderness or frontal sinus tenderness.      Mouth/Throat:      Tonsils: No tonsillar exudate or tonsillar abscesses.   Eyes:      Extraocular Movements: Extraocular movements intact.      Pupils: Pupils are equal, round, and reactive to light.   Cardiovascular:      Rate and Rhythm: Normal rate and regular rhythm.      Pulses: Normal pulses.      Heart sounds: Normal heart sounds. No murmur heard.    No gallop.   Pulmonary:      Effort: Pulmonary effort is normal. No respiratory distress.      Breath sounds: Examination of the left-upper field reveals wheezing. Examination of the left-middle field reveals wheezing. Examination of the left-lower field reveals wheezing. Wheezing present. No rhonchi or rales.   Abdominal:      General: There is no distension.      Palpations: Abdomen is soft.      Tenderness: There is no abdominal tenderness.   Musculoskeletal:         General: No swelling, deformity or signs of injury. Normal range of motion.      Cervical back: Normal range of motion.   Skin:     General: Skin is warm and dry.      Capillary Refill: Capillary refill takes less than 2 seconds.      Coloration: Skin is not jaundiced or pale.   Neurological:      General: No focal deficit present.      Mental Status: He is alert and oriented to person, place, and time.   Psychiatric:         Mood and Affect: Mood normal.         Behavior: Behavior normal.         Assessment:      ICD-10-CM ICD-9-CM   1. Mild intermittent reactive airway disease with wheezing with acute exacerbation  J45.21 493.92   2. Primary hypertension  I10 401.9     Plan:     Recommend prednisone 20 mg, albuterol 90 mcg/actuation inhaler for mild intermittent reactive airway disease with wheezing with acute exacerbation. Order Complete PFT with  bronchodilator and CXR.  Discussed with patient that Coreg can make his wheezing worse. Decrease Coreg to 12.5 mg for one week and then decrease to 6.25 mg twice per day. Will gradually wean off. Get back on nifedipine. Continue monitoring blood pressure. Keep < 140/90.  See labs below.     Orders Placed This Encounter   Procedures    X-Ray Chest PA And Lateral    B-TYPE NATRIURETIC PEPTIDE    Complete PFT with bronchodilator     Current Outpatient Medications   Medication Sig Dispense Refill    ferrous gluconate (FERGON) 324 MG tablet Take 1 tablet (324 mg total) by mouth 2 (two) times daily with meals. 180 tablet 5    fluticasone propionate (FLONASE) 50 mcg/actuation nasal spray 1 spray (50 mcg total) by Each Nostril route once daily. 16 g 3    hydroCHLOROthiazide (HYDRODIURIL) 25 MG tablet       levocetirizine (XYZAL) 5 MG tablet Take 1 tablet (5 mg total) by mouth every evening. 30 tablet 11    losartan (COZAAR) 100 MG tablet       SUBOXONE 12-3 mg Film Place 0.5 Film under the tongue 3 (three) times daily.      valsartan-hydrochlorothiazide (DIOVAN-HCT) 320-12.5 mg per tablet Take 1 tablet by mouth once daily. 90 tablet 3    albuterol (PROVENTIL/VENTOLIN HFA) 90 mcg/actuation inhaler Inhale 2 puffs into the lungs every 4 (four) hours as needed for Wheezing. 18 g 2    azelastine (ASTELIN) 137 mcg (0.1 %) nasal spray 1 spray (137 mcg total) by Nasal route 2 (two) times daily. 30 mL 4    carvediloL (COREG) 6.25 MG tablet Take 1 tablet (6.25 mg total) by mouth 2 (two) times daily with meals. 60 tablet 5    NIFEdipine (PROCARDIA-XL) 30 MG (OSM) 24 hr tablet Take 1 tablet (30 mg total) by mouth every evening. 30 tablet 11    predniSONE (DELTASONE) 20 MG tablet Take 1 tablet (20 mg total) by mouth As instructed. Take 4 tabs x 3 day, then 3 tabs x 4 day, then 2 tabs x 4 days, then 1 tab x 5 days. 37 tablet 0     No current facility-administered medications for this visit.       Medications Discontinued During This  Encounter   Medication Reason    NIFEdipine (PROCARDIA-XL) 30 MG (OSM) 24 hr tablet Reorder    carvediloL (COREG) 25 MG tablet          Follow up in about 3 weeks (around 11/3/2022).      Alisia Guardado MD  Scribe Attestation:   I, Jessica Cruz, am scribing for, and in the presence of, Dr.Arif Guardado I performed the above scribed service and the documentation accurately describes the services I performed. I attest to the accuracy of the note.    I, Dr. Alisia Guardado, reviewed documentation as scribed above. I performed the services described in this documentation.  I agree that the record reflects my personal performance and is accurate and complete. Alisia Guardado MD.  10/13/2022

## 2022-10-13 NOTE — PATIENT INSTRUCTIONS
Discussed with patient that Coreg can make his wheezing worse. Decrease Coreg to 12.5 mg for one week and then decrease to 6.25 mg twice per day. Will gradually wean off. Get back on nifedipine. Continue monitoring blood pressure. Keep < 140/90.  See labs below.

## 2022-10-14 ENCOUNTER — TELEPHONE (OUTPATIENT)
Dept: FAMILY MEDICINE | Facility: CLINIC | Age: 57
End: 2022-10-14

## 2022-10-14 NOTE — TELEPHONE ENCOUNTER
Spoke with pt verbalized understanding of lab results being in normal limits upon review by Dr. Guardado

## 2022-10-19 ENCOUNTER — CLINICAL SUPPORT (OUTPATIENT)
Dept: PULMONOLOGY | Facility: CLINIC | Age: 57
End: 2022-10-19
Payer: MEDICAID

## 2022-10-19 DIAGNOSIS — J45.21 MILD INTERMITTENT REACTIVE AIRWAY DISEASE WITH WHEEZING WITH ACUTE EXACERBATION: ICD-10-CM

## 2022-10-19 DIAGNOSIS — I10 PRIMARY HYPERTENSION: ICD-10-CM

## 2022-10-19 LAB
BRPFT: NORMAL
DLCO ADJ PRE: 24.5 ML/(MIN*MMHG)
DLCO SINGLE BREATH LLN: 23.35
DLCO SINGLE BREATH PRE REF: 80.9 %
DLCO SINGLE BREATH REF: 30.28
DLCOC SBVA LLN: 2.99
DLCOC SBVA PRE REF: 141.2 %
DLCOC SBVA REF: 4.15
DLCOC SINGLE BREATH LLN: 23.35
DLCOC SINGLE BREATH PRE REF: 80.9 %
DLCOC SINGLE BREATH REF: 30.28
DLCOVA LLN: 2.99
DLCOVA PRE REF: 141.2 %
DLCOVA PRE: 5.85 ML/(MIN*MMHG*L)
DLCOVA REF: 4.15
DLVAADJ PRE: 5.85 ML/(MIN*MMHG*L)
ERV LLN: -16448.76
ERV PRE REF: 48.4 %
ERV REF: 1.24
FEF 25 75 CHG: 27.2 %
FEF 25 75 LLN: 1.57
FEF 25 75 POST REF: 91.9 %
FEF 25 75 PRE REF: 72.3 %
FEF 25 75 REF: 3.14
FET100 CHG: -22.6 %
FEV1 CHG: 5.1 %
FEV1 FVC CHG: 7 %
FEV1 FVC LLN: 66
FEV1 FVC POST REF: 104.4 %
FEV1 FVC PRE REF: 97.6 %
FEV1 FVC REF: 78
FEV1 LLN: 2.84
FEV1 POST REF: 79.2 %
FEV1 PRE REF: 75.3 %
FEV1 REF: 3.74
FRCPLETH LLN: 2.66
FRCPLETH PREREF: 77.3 %
FRCPLETH REF: 3.64
FVC CHG: -1.8 %
FVC LLN: 3.71
FVC POST REF: 75.6 %
FVC PRE REF: 77 %
FVC REF: 4.84
IVC PRE: 2.9 L
IVC SINGLE BREATH LLN: 3.71
IVC SINGLE BREATH PRE REF: 60 %
IVC SINGLE BREATH REF: 4.84
MVV LLN: 121
MVV PRE REF: 87.6 %
MVV REF: 143
PEF CHG: 32.3 %
PEF LLN: 7.19
PEF POST REF: 107 %
PEF PRE REF: 80.9 %
PEF REF: 9.56
POST FEF 25 75: 2.89 L/S
POST FET 100: 8.55 SEC
POST FEV1 FVC: 81.01 %
POST FEV1: 2.96 L
POST FVC: 3.66 L
POST PEF: 10.23 L/S
PRE DLCO: 24.5 ML/(MIN*MMHG)
PRE ERV: 0.6 L
PRE FEF 25 75: 2.27 L/S
PRE FET 100: 11.06 SEC
PRE FEV1 FVC: 75.68 %
PRE FEV1: 2.82 L
PRE FRC PL: 2.82 L
PRE FVC: 3.73 L
PRE MVV: 125 L/MIN
PRE PEF: 7.74 L/S
PRE RV: 1.9 L
PRE TLC: 5.63 L
RAW LLN: 3.06
RAW PRE REF: 173 %
RAW PRE: 5.29 CMH2O*S/L
RAW REF: 3.06
RV LLN: 1.73
RV PRE REF: 79.1 %
RV REF: 2.4
RVTLC LLN: 28
RVTLC PRE REF: 92.4 %
RVTLC PRE: 33.78 %
RVTLC REF: 37
TLC LLN: 6.15
TLC PRE REF: 77.1 %
TLC REF: 7.3
VA PRE: 4.19 L
VA SINGLE BREATH LLN: 7.15
VA SINGLE BREATH PRE REF: 58.6 %
VA SINGLE BREATH REF: 7.15
VC LLN: 3.71
VC PRE REF: 77 %
VC PRE: 3.73 L
VC REF: 4.84
VTGRAWPRE: 2.27 L

## 2022-10-19 PROCEDURE — 94060 PR EVAL OF BRONCHOSPASM: ICD-10-PCS | Mod: 26,S$PBB,, | Performed by: INTERNAL MEDICINE

## 2022-10-19 PROCEDURE — 94726 PULM FUNCT TST PLETHYSMOGRAP: ICD-10-PCS | Mod: 26,S$PBB,, | Performed by: INTERNAL MEDICINE

## 2022-10-19 PROCEDURE — 94060 EVALUATION OF WHEEZING: CPT | Mod: PBBFAC

## 2022-10-19 PROCEDURE — 94729 DIFFUSING CAPACITY: CPT | Mod: PBBFAC

## 2022-10-19 PROCEDURE — 94729 PR C02/MEMBANE DIFFUSE CAPACITY: ICD-10-PCS | Mod: 26,S$PBB,, | Performed by: INTERNAL MEDICINE

## 2022-10-19 PROCEDURE — 94726 PLETHYSMOGRAPHY LUNG VOLUMES: CPT | Mod: PBBFAC

## 2022-10-19 PROCEDURE — 94726 PLETHYSMOGRAPHY LUNG VOLUMES: CPT | Mod: 26,S$PBB,, | Performed by: INTERNAL MEDICINE

## 2022-10-19 PROCEDURE — 94060 EVALUATION OF WHEEZING: CPT | Mod: 26,S$PBB,, | Performed by: INTERNAL MEDICINE

## 2022-10-19 PROCEDURE — 94729 DIFFUSING CAPACITY: CPT | Mod: 26,S$PBB,, | Performed by: INTERNAL MEDICINE

## 2022-10-24 ENCOUNTER — HOSPITAL ENCOUNTER (OUTPATIENT)
Dept: PREADMISSION TESTING | Facility: HOSPITAL | Age: 57
Discharge: HOME | End: 2022-10-24
Attending: INTERNAL MEDICINE
Payer: MEDICAID

## 2022-10-24 DIAGNOSIS — D50.9 IRON DEFICIENCY ANEMIA, UNSPECIFIED IRON DEFICIENCY ANEMIA TYPE: Primary | ICD-10-CM

## 2022-10-27 ENCOUNTER — ANESTHESIA (OUTPATIENT)
Dept: ENDOSCOPY | Facility: HOSPITAL | Age: 57
End: 2022-10-27
Payer: MEDICAID

## 2022-10-27 ENCOUNTER — HOSPITAL ENCOUNTER (OUTPATIENT)
Facility: HOSPITAL | Age: 57
Discharge: HOME | End: 2022-10-27
Attending: INTERNAL MEDICINE | Admitting: INTERNAL MEDICINE
Payer: MEDICAID

## 2022-10-27 ENCOUNTER — ANESTHESIA EVENT (OUTPATIENT)
Dept: ENDOSCOPY | Facility: HOSPITAL | Age: 57
End: 2022-10-27
Payer: MEDICAID

## 2022-10-27 VITALS
WEIGHT: 315 LBS | HEART RATE: 82 BPM | DIASTOLIC BLOOD PRESSURE: 59 MMHG | OXYGEN SATURATION: 80 % | BODY MASS INDEX: 44.1 KG/M2 | HEIGHT: 71 IN | TEMPERATURE: 98 F | SYSTOLIC BLOOD PRESSURE: 124 MMHG | RESPIRATION RATE: 15 BRPM

## 2022-10-27 DIAGNOSIS — D64.9 ANEMIA, UNSPECIFIED TYPE: Primary | ICD-10-CM

## 2022-10-27 LAB — POCT GLUCOSE: 96 MG/DL (ref 70–110)

## 2022-10-27 PROCEDURE — 00731 ANES UPR GI NDSC PX NOS: CPT | Performed by: INTERNAL MEDICINE

## 2022-10-27 PROCEDURE — 63600175 PHARM REV CODE 636 W HCPCS: Performed by: NURSE ANESTHETIST, CERTIFIED REGISTERED

## 2022-10-27 PROCEDURE — 43239 EGD BIOPSY SINGLE/MULTIPLE: CPT | Mod: ,,, | Performed by: INTERNAL MEDICINE

## 2022-10-27 PROCEDURE — 88305 TISSUE EXAM BY PATHOLOGIST: ICD-10-PCS | Mod: 26,,, | Performed by: PATHOLOGY

## 2022-10-27 PROCEDURE — 27201012 HC FORCEPS, HOT/COLD, DISP: Performed by: INTERNAL MEDICINE

## 2022-10-27 PROCEDURE — 43239 EGD BIOPSY SINGLE/MULTIPLE: CPT | Performed by: INTERNAL MEDICINE

## 2022-10-27 PROCEDURE — 37000008 HC ANESTHESIA 1ST 15 MINUTES: Performed by: INTERNAL MEDICINE

## 2022-10-27 PROCEDURE — 88305 TISSUE EXAM BY PATHOLOGIST: CPT | Performed by: PATHOLOGY

## 2022-10-27 PROCEDURE — 88305 TISSUE EXAM BY PATHOLOGIST: CPT | Mod: 26,,, | Performed by: PATHOLOGY

## 2022-10-27 PROCEDURE — 43239 PR EGD, FLEX, W/BIOPSY, SGL/MULTI: ICD-10-PCS | Mod: ,,, | Performed by: INTERNAL MEDICINE

## 2022-10-27 RX ORDER — PROPOFOL 10 MG/ML
VIAL (ML) INTRAVENOUS
Status: DISCONTINUED | OUTPATIENT
Start: 2022-10-27 | End: 2022-10-27

## 2022-10-27 RX ORDER — SODIUM CHLORIDE, SODIUM LACTATE, POTASSIUM CHLORIDE, CALCIUM CHLORIDE 600; 310; 30; 20 MG/100ML; MG/100ML; MG/100ML; MG/100ML
INJECTION, SOLUTION INTRAVENOUS CONTINUOUS PRN
Status: DISCONTINUED | OUTPATIENT
Start: 2022-10-27 | End: 2022-10-27

## 2022-10-27 RX ADMIN — SODIUM CHLORIDE, SODIUM LACTATE, POTASSIUM CHLORIDE, AND CALCIUM CHLORIDE: 600; 310; 30; 20 INJECTION, SOLUTION INTRAVENOUS at 02:10

## 2022-10-27 RX ADMIN — PROPOFOL 40 MG: 10 INJECTION, EMULSION INTRAVENOUS at 02:10

## 2022-10-27 RX ADMIN — PROPOFOL 100 MG: 10 INJECTION, EMULSION INTRAVENOUS at 02:10

## 2022-10-27 NOTE — [employer]ATION PATIENT INSTRUCTIONS
Discharge Summary/Instructions after an Endoscopic Procedure  Patient Name: Robert Munoz  Patient MRN: [id number]  Patient YOB: 1965 Thursday, October 27, 2022 Juliette Sawant MD  Dear patient,  As a result of recent federal legislation (The Federal Cures Act), you may   receive lab or pathology results from your procedure in your [facility]   account before your physician is able to contact you. Your physician or   their representative will relay the results to you with their   recommendations at their soonest availability.  Thank you,  RESTRICTIONS:  During your procedure today, you received medications for sedation.  These   medications may affect your judgment, balance and coordination.  Therefore,   for 24 hours, you have the following restrictions:   - DO NOT drive a car, operate machinery, make legal/financial decisions,   sign important papers or drink alcohol.    ACTIVITY:  Today: no heavy lifting, straining or running due to procedural   sedation/anesthesia.  The following day: return to full activity including work.  DIET:  Eat and drink normally unless instructed otherwise.     TREATMENT FOR COMMON SIDE EFFECTS:  - Mild abdominal pain, nausea, belching, bloating or excessive gas:  rest,   eat lightly and use a heating pad.  - Sore Throat: treat with throat lozenges and/or gargle with warm salt   water.  - Because air was used during the procedure, expelling large amounts of air   from your rectum or belching is normal.  - If a bowel prep was taken, you may not have a bowel movement for 1-3 days.    This is normal.  SYMPTOMS TO WATCH FOR AND REPORT TO YOUR PHYSICIAN:  1. Abdominal pain or bloating, other than gas cramps.  2. Chest pain.  3. Back pain.  4. Signs of infection such as: chills or fever occurring within 24 hours   after the procedure.  5. Rectal bleeding, which would show as bright red, maroon, or black stools.   (A tablespoon of blood from the rectum is not serious, especially if    hemorrhoids are present.)  6. Vomiting.  7. Weakness or dizziness.  GO DIRECTLY TO THE NEAREST EMERGENCY ROOM IF YOU HAVE ANY OF THE FOLLOWING:      Difficulty breathing              Chills and/or fever over 101 F   Persistent vomiting and/or vomiting blood   Severe abdominal pain   Severe chest pain   Black, tarry stools   Bleeding- more than one tablespoon   Any other symptom or condition that you feel may need urgent attention  Your doctor recommends these additional instructions:  If any biopsies were taken, your doctors clinic will contact you in 1 to 2   weeks with any results.  - Patient has a contact number available for emergencies.  The signs and   symptoms of potential delayed complications were discussed with the   patient.  Return to normal activities tomorrow.  Written discharge   instructions were provided to the patient.   - Discharge patient to home (via wheelchair).   - Resume previous diet today.   - Continue present medications.   - Await pathology results.  For questions, problems or results please call your physician Juliette Sawant MD at Work:  [phone]  If you have any questions about the above instructions, call the GI   department at [phone] or call the endoscopy unit at [phone]   from 7am until 3 pm.  [facility] [place], EMERGENCY ROOM PHONE NUMBER:   [phone]  IF A COMPLICATION OR EMERGENCY SITUATION ARISES AND YOU ARE UNABLE TO REACH   YOUR PHYSICIAN - GO DIRECTLY TO THE EMERGENCY ROOM.  I have read or have had read to me these discharge instructions for my   procedure and have received a written copy.  I understand these   instructions and will follow-up with my physician if I have any questions.     __________________________________       _____________________________________  Nurse Signature                                          Patient/Designated   Responsible Party Signature  MD Juliette Smith MD  10/27/2022 2:23:48  PM  This report has been verified and signed electronically.  Dear patient,  As a result of recent federal legislation (The Federal Cures Act), you may   receive lab or pathology results from your procedure in your [facility]   account before your physician is able to contact you. Your physician or   their representative will relay the results to you with their   recommendations at their soonest availability.  Thank you,  [employer]ATION

## 2022-10-27 NOTE — TRANSFER OF CARE
"Anesthesia Transfer of Care Note    Patient: Robert Munzo    Procedure(s) Performed: Procedure(s) (LRB):  EGD (ESOPHAGOGASTRODUODENOSCOPY) (N/A)    Patient location: GI    Anesthesia Type: MAC    Transport from OR: Transported from OR on room air with adequate spontaneous ventilation    Post pain: adequate analgesia    Post assessment: no apparent anesthetic complications and tolerated procedure well    Post vital signs: stable    Level of consciousness: awake, alert and oriented    Nausea/Vomiting: no nausea/vomiting    Complications: none    Transfer of care protocol was followed      Last vitals:   Visit Vitals  BP (!) 168/78 (BP Location: Left arm, Patient Position: Lying)   Pulse 83   Temp 36.8 °C (98.3 °F) (Temporal)   Resp 16   Ht 5' 11" (1.803 m)   Wt (!) 158.8 kg (350 lb)   SpO2 95%   BMI 48.82 kg/m²     "

## 2022-10-27 NOTE — PLAN OF CARE
Dr. Sawant at bedside to speak with pt and spouse about procedure and results. All questions answered with no further questions at this time

## 2022-10-27 NOTE — ANESTHESIA POSTPROCEDURE EVALUATION
Anesthesia Post Evaluation    Patient: Robert Munoz    Procedure(s) Performed: Procedure(s) (LRB):  EGD (ESOPHAGOGASTRODUODENOSCOPY) (N/A)    Final Anesthesia Type: MAC      Patient location during evaluation: GI PACU  Patient participation: Yes- Able to Participate  Level of consciousness: awake and alert and oriented  Post-procedure vital signs: reviewed and stable  Pain management: adequate  Airway patency: patent  HODA mitigation strategies: Multimodal analgesia  PONV status at discharge: No PONV  Anesthetic complications: no      Cardiovascular status: hemodynamically stable  Respiratory status: unassisted, spontaneous ventilation and room air  Hydration status: euvolemic  Follow-up not needed.          Vitals Value Taken Time   /73 10/27/22 1425   Temp 36.8 °C (98.3 °F) 10/27/22 1425   Pulse 83 10/27/22 1425   Resp 16 10/27/22 1425   SpO2 95 % 10/27/22 1425         No case tracking events are documented in the log.      Pain/Celsa Score: No data recorded

## 2022-10-27 NOTE — H&P
PRE PROCEDURE H&P    Patient Name: Robert Munoz  MRN: [id number]  : 1965  Date of Procedure:  10/27/2022  Referring Physician: Eula Dominguez MD  Primary Physician: Alisia Guardado MD  Procedure Physician: Juliette Sawant MD       Planned Procedure: EGD  Diagnosis: anemia   Chief Complaint: Same as above    HPI: Patient is an 57 y.o. male is here for the above.       Past Medical History:   Past Medical History:   Diagnosis Date    Hypertension         Past Surgical History:  Past Surgical History:   Procedure Laterality Date    COLONOSCOPY N/A 10/13/2021    Procedure: COLONOSCOPY;  Surgeon: Eula Dominguez MD;  Location: [facility];  Service: Endoscopy;  Laterality: N/A;        Home Medications:  Prior to Admission medications    Medication Sig Start Date End Date Taking? Authorizing Provider   albuterol (PROVENTIL/VENTOLIN HFA) 90 mcg/actuation inhaler Inhale 2 puffs into the lungs every 4 (four) hours as needed for Wheezing. 10/13/22 10/13/23 Yes Alisia Guardado MD   azelastine (ASTELIN) 137 mcg (0.1 %) nasal spray 1 spray (137 mcg total) by Nasal route 2 (two) times daily. 22 Yes Alisia Guardado MD   carvediloL (COREG) 6.25 MG tablet Take 1 tablet (6.25 mg total) by mouth 2 (two) times daily with meals. 10/13/22  Yes Alisia Guardado MD   ferrous gluconate (FERGON) 324 MG tablet Take 1 tablet (324 mg total) by mouth 2 (two) times daily with meals. 22  Yes Alisia Guardado MD   fluticasone propionate (FLONASE) 50 mcg/actuation nasal spray 1 spray (50 mcg total) by Each Nostril route once daily. 10/10/22  Yes Alisia Guardado MD   hydroCHLOROthiazide (HYDRODIURIL) 25 MG tablet  22  Yes Historical Provider   levocetirizine (XYZAL) 5 MG tablet Take 1 tablet (5 mg total) by mouth every evening. 3/30/22 3/30/23 Yes Alisia Guardado MD   losartan (COZAAR) 100 MG tablet  22  Yes Historical Provider   NIFEdipine (PROCARDIA-XL) 30 MG (OSM) 24 hr tablet Take 1 tablet (30 mg total) by mouth every  "evening. 10/13/22 10/13/23 Yes Alisia Guardado MD   SUBOXONE 12-3 mg Film Place 0.5 Film under the tongue 3 (three) times daily. 9/2/20  Yes Historical Provider   valsartan-hydrochlorothiazide (DIOVAN-HCT) 320-12.5 mg per tablet Take 1 tablet by mouth once daily. 3/30/22 3/30/23 Yes Alisia Guardado MD   predniSONE (DELTASONE) 20 MG tablet Take 1 tablet (20 mg total) by mouth As instructed. Take 4 tabs x 3 day, then 3 tabs x 4 day, then 2 tabs x 4 days, then 1 tab x 5 days. 10/13/22   Alisia Guardado MD        Allergies:  Review of patient's allergies indicates:  No Known Allergies     Social History:   Social History     Socioeconomic History    Marital status:    Tobacco Use    Smoking status: Never    Smokeless tobacco: Current     Types: Snuff   Substance and Sexual Activity    Alcohol use: No    Drug use: No    Sexual activity: Yes       Family History:  Family History   Problem Relation Age of Onset    Diabetes Mother     Anuerysm Mother     Heart disease Father        ROS: No acute cardiac events, no acute respiratory complaints.     Physical Exam (all patients):    BP (!) 168/78 (BP Location: Left arm, Patient Position: Lying)   Pulse 78   Temp 97.5 °F (36.4 °C) (Temporal)   Resp 16   Ht 5' 11" (1.803 m)   Wt (!) 158.8 kg (350 lb)   SpO2 98%   BMI 48.82 kg/m²   Lungs: Clear to auscultation bilaterally, respirations unlabored  Heart: Regular rate and rhythm, S1 and S2 normal, no obvious murmurs  Abdomen:         Soft, non-tender, bowel sounds normal, no masses, no organomegaly    Lab Results   Component Value Date    WBC 10.92 06/03/2022    MCV 91 06/03/2022    RDW 13.6 06/03/2022     06/03/2022    GLU 97 06/03/2022    HGBA1C 5.8 (H) 06/03/2022    BUN 20 06/03/2022     06/03/2022    K 4.3 06/03/2022     06/03/2022        SEDATION PLAN: per anesthesia      History reviewed, vital signs satisfactory, cardiopulmonary status satisfactory, sedation options, risks and plans have been " discussed with the patient  All their questions were answered and the patient agrees to the sedation procedures as planned and the patient is deemed an appropriate candidate for the sedation as planned.    Procedure explained to patient, informed consent obtained and placed in chart.    Juliette Sawant  10/27/2022  1:34 PM

## 2022-10-27 NOTE — ANESTHESIA RELEASE NOTE
"Anesthesia Release from PACU Note    Patient: Robert Munoz    Procedure(s) Performed: Procedure(s) (LRB):  EGD (ESOPHAGOGASTRODUODENOSCOPY) (N/A)    Anesthesia type: MAC    Post pain: Adequate analgesia    Post assessment: no apparent anesthetic complications and tolerated procedure well    Last Vitals:   Visit Vitals  /73 (BP Location: Left arm, Patient Position: Lying)   Pulse 83   Temp 36.8 °C (98.3 °F) (Temporal)   Resp 16   Ht 5' 11" (1.803 m)   Wt (!) 158.8 kg (350 lb)   SpO2 95%   BMI 48.82 kg/m²       Post vital signs: stable    Level of consciousness: awake, alert  and oriented    Nausea/Vomiting: no nausea/no vomiting    Complications: none    Airway Patency: patent    Respiratory: unassisted, spontaneous ventilation, room air    Cardiovascular: stable and blood pressure at baseline    Hydration: euvolemic  "

## 2022-10-27 NOTE — ANESTHESIA PREPROCEDURE EVALUATION
10/27/2022  Robert Munoz is a 57 y.o., male.      Pre-op Assessment    I have reviewed the Patient Summary Reports.     I have reviewed the Nursing Notes. I have reviewed the NPO Status.   I have reviewed the Medications.     Review of Systems  Anesthesia Hx:  No problems with previous Anesthesia    Social:  Non-Smoker, No Alcohol Use    Hematology/Oncology:     Oncology Normal    -- Anemia:   EENT/Dental:EENT/Dental Normal   Cardiovascular:   Hypertension, well controlled    Pulmonary:  Pulmonary Normal    Renal/:  Renal/ Normal     Hepatic/GI:  Hepatic/GI Normal    Musculoskeletal:  Spine Disorders: lumbar Chronic Pain    Neurological:  Neurology Normal    Endocrine:  Endocrine Normal    Dermatological:  Skin Normal    Psych:  Psychiatric Normal           Physical Exam  General: Well nourished, Cooperative, Alert and Oriented    Airway:  Mallampati: III   Mouth Opening: Normal  TM Distance: Normal  Tongue: Large  Neck ROM: Normal ROM    Dental:  Intact        Anesthesia Plan  Type of Anesthesia, risks & benefits discussed:    Anesthesia Type: MAC  Intra-op Monitoring Plan: Standard ASA Monitors  Post Op Pain Control Plan: multimodal analgesia  Informed Consent: Informed consent signed with the Patient and all parties understand the risks and agree with anesthesia plan.  All questions answered.   ASA Score: 3  Day of Surgery Review of History & Physical: H&P Update referred to the surgeon/provider.    Ready For Surgery From Anesthesia Perspective.     .

## 2022-10-28 ENCOUNTER — PATIENT MESSAGE (OUTPATIENT)
Dept: FAMILY MEDICINE | Facility: CLINIC | Age: 57
End: 2022-10-28

## 2022-11-01 LAB
FINAL PATHOLOGIC DIAGNOSIS: NORMAL
GROSS: NORMAL
Lab: NORMAL

## 2022-11-03 ENCOUNTER — OFFICE VISIT (OUTPATIENT)
Dept: FAMILY MEDICINE | Facility: CLINIC | Age: 57
End: 2022-11-03
Payer: MEDICAID

## 2022-11-03 ENCOUNTER — LAB VISIT (OUTPATIENT)
Dept: LAB | Facility: HOSPITAL | Age: 57
End: 2022-11-03
Attending: FAMILY MEDICINE
Payer: MEDICAID

## 2022-11-03 VITALS
SYSTOLIC BLOOD PRESSURE: 120 MMHG | WEIGHT: 315 LBS | DIASTOLIC BLOOD PRESSURE: 68 MMHG | BODY MASS INDEX: 44.1 KG/M2 | TEMPERATURE: 99 F | HEIGHT: 71 IN | OXYGEN SATURATION: 98 % | HEART RATE: 64 BPM

## 2022-11-03 DIAGNOSIS — D50.9 IRON DEFICIENCY ANEMIA, UNSPECIFIED IRON DEFICIENCY ANEMIA TYPE: ICD-10-CM

## 2022-11-03 DIAGNOSIS — I10 HYPERTENSION NOT AT GOAL: Primary | ICD-10-CM

## 2022-11-03 DIAGNOSIS — E66.01 MORBID OBESITY WITH BMI OF 50.0-59.9, ADULT: ICD-10-CM

## 2022-11-03 DIAGNOSIS — R94.2 MIXED OBSTRUCTIVE AND RESTRICTIVE VENTILATORY DEFECT: ICD-10-CM

## 2022-11-03 LAB
ALBUMIN SERPL BCP-MCNC: 3.5 G/DL (ref 3.5–5.2)
ALP SERPL-CCNC: 87 U/L (ref 55–135)
ALT SERPL W/O P-5'-P-CCNC: 23 U/L (ref 10–44)
ANION GAP SERPL CALC-SCNC: 10 MMOL/L (ref 8–16)
AST SERPL-CCNC: 15 U/L (ref 10–40)
BASOPHILS # BLD AUTO: 0.07 K/UL (ref 0–0.2)
BASOPHILS NFR BLD: 0.5 % (ref 0–1.9)
BILIRUB SERPL-MCNC: 0.4 MG/DL (ref 0.1–1)
BUN SERPL-MCNC: 22 MG/DL (ref 6–20)
CALCIUM SERPL-MCNC: 9.7 MG/DL (ref 8.7–10.5)
CHLORIDE SERPL-SCNC: 101 MMOL/L (ref 95–110)
CO2 SERPL-SCNC: 24 MMOL/L (ref 23–29)
CREAT SERPL-MCNC: 1 MG/DL (ref 0.5–1.4)
DIFFERENTIAL METHOD: ABNORMAL
EOSINOPHIL # BLD AUTO: 0.3 K/UL (ref 0–0.5)
EOSINOPHIL NFR BLD: 2.4 % (ref 0–8)
ERYTHROCYTE [DISTWIDTH] IN BLOOD BY AUTOMATED COUNT: 13.4 % (ref 11.5–14.5)
EST. GFR  (NO RACE VARIABLE): >60 ML/MIN/1.73 M^2
GLUCOSE SERPL-MCNC: 117 MG/DL (ref 70–110)
HCT VFR BLD AUTO: 41 % (ref 40–54)
HGB BLD-MCNC: 12.8 G/DL (ref 14–18)
IMM GRANULOCYTES # BLD AUTO: 0.06 K/UL (ref 0–0.04)
IMM GRANULOCYTES NFR BLD AUTO: 0.4 % (ref 0–0.5)
IRON SERPL-MCNC: 45 UG/DL (ref 45–160)
LYMPHOCYTES # BLD AUTO: 2.4 K/UL (ref 1–4.8)
LYMPHOCYTES NFR BLD: 17.4 % (ref 18–48)
MCH RBC QN AUTO: 28.7 PG (ref 27–31)
MCHC RBC AUTO-ENTMCNC: 31.2 G/DL (ref 32–36)
MCV RBC AUTO: 92 FL (ref 82–98)
MONOCYTES # BLD AUTO: 0.9 K/UL (ref 0.3–1)
MONOCYTES NFR BLD: 6.5 % (ref 4–15)
NEUTROPHILS # BLD AUTO: 9.9 K/UL (ref 1.8–7.7)
NEUTROPHILS NFR BLD: 72.8 % (ref 38–73)
NRBC BLD-RTO: 0 /100 WBC
PLATELET # BLD AUTO: 237 K/UL (ref 150–450)
PMV BLD AUTO: 11.6 FL (ref 9.2–12.9)
POTASSIUM SERPL-SCNC: 3.9 MMOL/L (ref 3.5–5.1)
PROT SERPL-MCNC: 7.2 G/DL (ref 6–8.4)
RBC # BLD AUTO: 4.46 M/UL (ref 4.6–6.2)
SATURATED IRON: 14 % (ref 20–50)
SODIUM SERPL-SCNC: 135 MMOL/L (ref 136–145)
TOTAL IRON BINDING CAPACITY: 324 UG/DL (ref 250–450)
TRANSFERRIN SERPL-MCNC: 219 MG/DL (ref 200–375)
WBC # BLD AUTO: 13.6 K/UL (ref 3.9–12.7)

## 2022-11-03 PROCEDURE — 3061F PR NEG MICROALBUMINURIA RESULT DOCUMENTED/REVIEW: ICD-10-PCS | Mod: CPTII,,, | Performed by: FAMILY MEDICINE

## 2022-11-03 PROCEDURE — 3066F PR DOCUMENTATION OF TREATMENT FOR NEPHROPATHY: ICD-10-PCS | Mod: CPTII,,, | Performed by: FAMILY MEDICINE

## 2022-11-03 PROCEDURE — 36415 COLL VENOUS BLD VENIPUNCTURE: CPT | Mod: PO | Performed by: FAMILY MEDICINE

## 2022-11-03 PROCEDURE — 3078F DIAST BP <80 MM HG: CPT | Mod: CPTII,,, | Performed by: FAMILY MEDICINE

## 2022-11-03 PROCEDURE — 99999 PR PBB SHADOW E&M-EST. PATIENT-LVL III: CPT | Mod: PBBFAC,,, | Performed by: FAMILY MEDICINE

## 2022-11-03 PROCEDURE — 1159F MED LIST DOCD IN RCRD: CPT | Mod: CPTII,,, | Performed by: FAMILY MEDICINE

## 2022-11-03 PROCEDURE — 3066F NEPHROPATHY DOC TX: CPT | Mod: CPTII,,, | Performed by: FAMILY MEDICINE

## 2022-11-03 PROCEDURE — 3074F SYST BP LT 130 MM HG: CPT | Mod: CPTII,,, | Performed by: FAMILY MEDICINE

## 2022-11-03 PROCEDURE — 1160F PR REVIEW ALL MEDS BY PRESCRIBER/CLIN PHARMACIST DOCUMENTED: ICD-10-PCS | Mod: CPTII,,, | Performed by: FAMILY MEDICINE

## 2022-11-03 PROCEDURE — 3008F PR BODY MASS INDEX (BMI) DOCUMENTED: ICD-10-PCS | Mod: CPTII,,, | Performed by: FAMILY MEDICINE

## 2022-11-03 PROCEDURE — 80053 COMPREHEN METABOLIC PANEL: CPT | Performed by: FAMILY MEDICINE

## 2022-11-03 PROCEDURE — 90694 VACC AIIV4 NO PRSRV 0.5ML IM: CPT | Mod: PBBFAC,PO

## 2022-11-03 PROCEDURE — 1160F RVW MEDS BY RX/DR IN RCRD: CPT | Mod: CPTII,,, | Performed by: FAMILY MEDICINE

## 2022-11-03 PROCEDURE — 3044F PR MOST RECENT HEMOGLOBIN A1C LEVEL <7.0%: ICD-10-PCS | Mod: CPTII,,, | Performed by: FAMILY MEDICINE

## 2022-11-03 PROCEDURE — 85025 COMPLETE CBC W/AUTO DIFF WBC: CPT | Performed by: FAMILY MEDICINE

## 2022-11-03 PROCEDURE — 4010F ACE/ARB THERAPY RXD/TAKEN: CPT | Mod: CPTII,,, | Performed by: FAMILY MEDICINE

## 2022-11-03 PROCEDURE — 3078F PR MOST RECENT DIASTOLIC BLOOD PRESSURE < 80 MM HG: ICD-10-PCS | Mod: CPTII,,, | Performed by: FAMILY MEDICINE

## 2022-11-03 PROCEDURE — 99214 OFFICE O/P EST MOD 30 MIN: CPT | Mod: S$PBB,,, | Performed by: FAMILY MEDICINE

## 2022-11-03 PROCEDURE — 4010F PR ACE/ARB THEARPY RXD/TAKEN: ICD-10-PCS | Mod: CPTII,,, | Performed by: FAMILY MEDICINE

## 2022-11-03 PROCEDURE — 3061F NEG MICROALBUMINURIA REV: CPT | Mod: CPTII,,, | Performed by: FAMILY MEDICINE

## 2022-11-03 PROCEDURE — 99999 PR PBB SHADOW E&M-EST. PATIENT-LVL III: ICD-10-PCS | Mod: PBBFAC,,, | Performed by: FAMILY MEDICINE

## 2022-11-03 PROCEDURE — 1159F PR MEDICATION LIST DOCUMENTED IN MEDICAL RECORD: ICD-10-PCS | Mod: CPTII,,, | Performed by: FAMILY MEDICINE

## 2022-11-03 PROCEDURE — 84466 ASSAY OF TRANSFERRIN: CPT | Performed by: FAMILY MEDICINE

## 2022-11-03 PROCEDURE — 3044F HG A1C LEVEL LT 7.0%: CPT | Mod: CPTII,,, | Performed by: FAMILY MEDICINE

## 2022-11-03 PROCEDURE — 3008F BODY MASS INDEX DOCD: CPT | Mod: CPTII,,, | Performed by: FAMILY MEDICINE

## 2022-11-03 PROCEDURE — 99213 OFFICE O/P EST LOW 20 MIN: CPT | Mod: PBBFAC,PO | Performed by: FAMILY MEDICINE

## 2022-11-03 PROCEDURE — 99214 PR OFFICE/OUTPT VISIT, EST, LEVL IV, 30-39 MIN: ICD-10-PCS | Mod: S$PBB,,, | Performed by: FAMILY MEDICINE

## 2022-11-03 PROCEDURE — 3074F PR MOST RECENT SYSTOLIC BLOOD PRESSURE < 130 MM HG: ICD-10-PCS | Mod: CPTII,,, | Performed by: FAMILY MEDICINE

## 2022-11-03 NOTE — PROGRESS NOTES
Chief Complaint:    Chief Complaint   Patient presents with    Follow-up       History of Present Illness:  11/03/2022:-  Patient presents today for a three week follow-up    Patient reports a trip and fall, landed on his knees. He has an abrasion on his L lower leg.   He's weaning off Coreg. He doesn't know the name of his HTN medications. His pressure is elevated at home.   His EDG showed some stomach inflammation.   His wheezing has improved with prednisone and albuterol. PFT was normal but his lungs have problems expanding due to his body weight.   Ran out of iron pills but will buy some more.       10/13/2022:-  Patient presents today for wheezing for 1.5 months.     Worsens at night. He can't lay down flat at night so he sleeps sitting up. Reports associated asthma and R tonsil pain. Denies sinus pain.  No diagnosis of asthma. Wheezing can also occur sometimes with walking or exertion.   He hasn't been getting his back injections recently due to time, money.   Blood pressure does well at home.       04/19/2022:-  Patient with a hx of right Ally's deformity, weakness of right lower extremity, tobacco use, and gait abnormality for a follow up.     Reports sinus congestion. Has been having sinus problems for over a year. No relief with flonase and astelin. Has appt with Dr. Eliana Balderas on 05/25.  His leg swelling has gone down. Started Diovan-HCT. Discontinued losartan.       03/30/2022:-  Patient with a hx of right Ally's deformity, weakness of right lower extremity, tobacco use, and gait abnormality presents today for hypertension     Seeing podiatry for his right heel.   Reports congestion. Denies sinus pain. Needs refill on Flonase. Wants to see ENT. Sneezes a lot.   Systolic can get up to 160 at home. Didn't take his medicine this morning.   Reports leg swelling. Get blisters on his legs. Using Noxzema on his legs.       10/18/2021:-  He is here for a 2 week follow-up for high blood pressure. Weight is  down 4 lbs. Patient has gotten colonoscopy done. He has been watching diet.    He would also like to schedule removal cyst on the back of scalp.      10/04/2021:-  He is here for follow-up he says his blood pressure been running high daily ever since he had a 2nd COVID shot.  He has been seeing physical therapy for his heel pain and did a few sessions that made his back pain worse so he could go anymore but he says he might be doing going there again.  He has a lump on the back of his scalp which seems to be growing in size although it is not tender.  He will be going back to Physical Medicine for injection of the back.      05/19/2021:-  He is having lot of pain in his heel he has done some exercises but is not helping it hurts initially and then starts to get better as he walks.      02/03/2021:-  Complaining of right heel pain he said this is a chronic problem hurts to walk injury other than some injury as a child  Still has back pain recently nerve conduction study which was possibly not suggestive of lumbar radiculopathy but the test was limited by body habitus.      12/23/2020:-  He is here for follow-up he is waiting to get a nerve conduction study on his lower extremity  Labs reveal that he has prediabetic  Also revealed mild anemia and mildly elevated ALT denies alcohol use he is morbidly obese however.  He has a history abuse of decongestant nasally which he quit doing it but he is having difficulty with the use of Flonase and is complaining of constant congestion.    11/17/2020:-  Patient is new to the practice  He says he suffers with chronic back pain for a number years he describes the pain as in the low back and slight activity for example washing dishes or lawn mowing be backing or slight walking causes severe pain in the back and his legs get weak feels like he is about to fall down and gives out any strength in his legs.  Denies any tingling or numbness he describes the pain as does radiating to  the legs specially with exertion.  He has not had any testing done other than just x-ray and has been sent for physical therapy at least few times which he says has not done him any good.  He says he is unable to work like this he that he wants his back fixed or he wants to go on disability.    He is also being treated for hypertension  Never had a colonoscopy      ROS:  Review of Systems   Constitutional:  Negative for appetite change, chills and fever.   HENT:  Negative for congestion, ear pain, postnasal drip, rhinorrhea, sinus pressure and sinus pain.    Eyes:  Negative for pain.   Respiratory:  Negative for cough, chest tightness and shortness of breath.    Cardiovascular:  Negative for chest pain and palpitations.   Gastrointestinal:  Negative for abdominal pain, blood in stool, constipation, diarrhea and nausea.   Genitourinary:  Negative for difficulty urinating, dysuria, flank pain and hematuria.   Musculoskeletal:  Negative for arthralgias and myalgias.   Skin:  Negative for pallor and wound.   Neurological:  Negative for dizziness, tremors, speech difficulty, light-headedness and headaches.   Psychiatric/Behavioral:  Negative for behavioral problems, dysphoric mood and sleep disturbance. The patient is not nervous/anxious.    All other systems reviewed and are negative.    Past Medical History:   Diagnosis Date    Hypertension        Social History:  Social History     Socioeconomic History    Marital status:    Tobacco Use    Smoking status: Never    Smokeless tobacco: Current     Types: Snuff   Substance and Sexual Activity    Alcohol use: No    Drug use: No    Sexual activity: Yes       Family History:   family history includes Anuerysm in his mother; Diabetes in his mother; Heart disease in his father.    Health Maintenance   Topic Date Due    Lipid Panel  06/03/2027    TETANUS VACCINE  10/04/2031    Hepatitis C Screening  Completed       Physical Exam:    Vital Signs  Temp: 99 °F (37.2  "°C)  Temp src: Temporal  Pulse: 64  SpO2: 98 %  BP: 120/68  BP Location: Left arm  Patient Position: Sitting  Height and Weight  Height: 5' 11" (180.3 cm)  Weight: (!) 171.4 kg (377 lb 13.9 oz)  BSA (Calculated - sq m): 2.93 sq meters  BMI (Calculated): 52.7  Weight in (lb) to have BMI = 25: 178.9]    Body mass index is 52.7 kg/m².    Physical Exam  Vitals and nursing note reviewed.   Constitutional:       Appearance: Normal appearance. He is morbidly obese.   HENT:      Head: Normocephalic and atraumatic.      Right Ear: Tympanic membrane normal.      Left Ear: Tympanic membrane normal.   Eyes:      Extraocular Movements: Extraocular movements intact.      Pupils: Pupils are equal, round, and reactive to light.   Cardiovascular:      Rate and Rhythm: Normal rate and regular rhythm.      Pulses: Normal pulses.      Heart sounds: Normal heart sounds. No murmur heard.    No gallop.   Pulmonary:      Effort: Pulmonary effort is normal. No respiratory distress.      Breath sounds: Normal breath sounds. No wheezing, rhonchi or rales.   Abdominal:      General: There is no distension.      Palpations: Abdomen is soft.      Tenderness: There is no abdominal tenderness.   Musculoskeletal:         General: No swelling, deformity or signs of injury. Normal range of motion.      Cervical back: Normal range of motion.   Skin:     General: Skin is warm and dry.      Capillary Refill: Capillary refill takes less than 2 seconds.      Coloration: Skin is not jaundiced or pale.   Neurological:      General: No focal deficit present.      Mental Status: He is alert and oriented to person, place, and time.   Psychiatric:         Mood and Affect: Mood normal.         Behavior: Behavior normal.         Assessment:      ICD-10-CM ICD-9-CM   1. Hypertension not at goal  I10 401.9   2. Morbid obesity with BMI of 50.0-59.9, adult  E66.01 278.01    Z68.43 V85.43   3. Iron deficiency anemia, unspecified iron deficiency anemia type  D50.9 " 280.9   4. Mixed obstructive and restrictive ventilatory defect  R94.2 794.2     Plan:     Continue monitoring blood pressure. Keep < 140/90. Patient will bring the medication he's taking to office. Purchase thigh cuff.   Try intermittent fasting.   Monitor weight. Stay active as tolerated. Discussed with patient that losing at least 25 pounds will help with his blood pressure, back pain, breathing.   Mixed obstructive and restrictive lung defect stable at this point   Iron stay anemia check iron levels today, ran out of iron pills  See labs below. Recheck iron.      Orders Placed This Encounter   Procedures    CBC Auto Differential    Comprehensive Metabolic Panel    Iron and TIBC     Current Outpatient Medications   Medication Sig Dispense Refill    albuterol (PROVENTIL/VENTOLIN HFA) 90 mcg/actuation inhaler Inhale 2 puffs into the lungs every 4 (four) hours as needed for Wheezing. 18 g 2    carvediloL (COREG) 6.25 MG tablet Take 1 tablet (6.25 mg total) by mouth 2 (two) times daily with meals. 60 tablet 5    ferrous gluconate (FERGON) 324 MG tablet Take 1 tablet (324 mg total) by mouth 2 (two) times daily with meals. 180 tablet 5    fluticasone propionate (FLONASE) 50 mcg/actuation nasal spray 1 spray (50 mcg total) by Each Nostril route once daily. 16 g 3    hydroCHLOROthiazide (HYDRODIURIL) 25 MG tablet       levocetirizine (XYZAL) 5 MG tablet Take 1 tablet (5 mg total) by mouth every evening. 30 tablet 11    losartan (COZAAR) 100 MG tablet       NIFEdipine (PROCARDIA-XL) 30 MG (OSM) 24 hr tablet Take 1 tablet (30 mg total) by mouth every evening. 30 tablet 11    predniSONE (DELTASONE) 20 MG tablet Take 1 tablet (20 mg total) by mouth As instructed. Take 4 tabs x 3 day, then 3 tabs x 4 day, then 2 tabs x 4 days, then 1 tab x 5 days. 37 tablet 0    SUBOXONE 12-3 mg Film Place 0.5 Film under the tongue 3 (three) times daily.      valsartan-hydrochlorothiazide (DIOVAN-HCT) 320-12.5 mg per tablet Take 1 tablet by  mouth once daily. 90 tablet 3     No current facility-administered medications for this visit.       There are no discontinued medications.        No follow-ups on file.      Alisia Guardado MD  Scribe Attestation:   I, Jessica Cruz, am scribing for, and in the presence of, Dr.Arif Guardado I performed the above scribed service and the documentation accurately describes the services I performed. I attest to the accuracy of the note.    I, Dr. Alisia Guardado, reviewed documentation as scribed above. I performed the services described in this documentation.  I agree that the record reflects my personal performance and is accurate and complete. Alisia Guardado MD.  11/03/2022

## 2022-11-04 ENCOUNTER — PATIENT MESSAGE (OUTPATIENT)
Dept: HEPATOLOGY | Facility: CLINIC | Age: 57
End: 2022-11-04

## 2022-11-10 RX ORDER — FERROUS GLUCONATE 324(38)MG
324 TABLET ORAL 2 TIMES DAILY WITH MEALS
Qty: 180 TABLET | Refills: 5 | Status: SHIPPED | OUTPATIENT
Start: 2022-11-10

## 2022-11-15 ENCOUNTER — TELEPHONE (OUTPATIENT)
Dept: FAMILY MEDICINE | Facility: CLINIC | Age: 57
End: 2022-11-15

## 2022-11-15 NOTE — TELEPHONE ENCOUNTER
----- Message from Alisia Guardado MD sent at 11/6/2022  6:04 PM CST -----  Iron levels are low, please send ferrous gluconate 325 mg twice a day for 6 month

## 2022-11-15 NOTE — TELEPHONE ENCOUNTER
Spoke with pt verbalized understanding of lab results and recommendation of taking iron tablets upon review by Dr. Guardado

## 2023-01-03 ENCOUNTER — TELEPHONE (OUTPATIENT)
Dept: PAIN MEDICINE | Facility: CLINIC | Age: 58
End: 2023-01-03
Payer: MEDICARE

## 2023-01-04 ENCOUNTER — OFFICE VISIT (OUTPATIENT)
Dept: PAIN MEDICINE | Facility: CLINIC | Age: 58
End: 2023-01-04
Payer: MEDICARE

## 2023-01-04 VITALS
RESPIRATION RATE: 17 BRPM | WEIGHT: 315 LBS | SYSTOLIC BLOOD PRESSURE: 148 MMHG | DIASTOLIC BLOOD PRESSURE: 85 MMHG | BODY MASS INDEX: 44.1 KG/M2 | HEART RATE: 82 BPM | HEIGHT: 71 IN

## 2023-01-04 DIAGNOSIS — M54.16 LUMBAR RADICULOPATHY: Primary | ICD-10-CM

## 2023-01-04 PROCEDURE — 99999 PR PBB SHADOW E&M-EST. PATIENT-LVL III: ICD-10-PCS | Mod: PBBFAC,,, | Performed by: PHYSICAL MEDICINE & REHABILITATION

## 2023-01-04 PROCEDURE — 99999 PR PBB SHADOW E&M-EST. PATIENT-LVL III: CPT | Mod: PBBFAC,,, | Performed by: PHYSICAL MEDICINE & REHABILITATION

## 2023-01-04 PROCEDURE — 99214 OFFICE O/P EST MOD 30 MIN: CPT | Mod: S$PBB,,, | Performed by: PHYSICAL MEDICINE & REHABILITATION

## 2023-01-04 PROCEDURE — 99214 PR OFFICE/OUTPT VISIT, EST, LEVL IV, 30-39 MIN: ICD-10-PCS | Mod: S$PBB,,, | Performed by: PHYSICAL MEDICINE & REHABILITATION

## 2023-01-04 PROCEDURE — 99213 OFFICE O/P EST LOW 20 MIN: CPT | Mod: PBBFAC | Performed by: PHYSICAL MEDICINE & REHABILITATION

## 2023-01-04 NOTE — PROGRESS NOTES
Established Patient Chronic Pain Note (Follow up visit)    Chief Complaint:   Chief Complaint   Patient presents with    Back Pain    Leg Pain       SUBJECTIVE:    Robert Munoz is a 57 y.o. male who presents to the clinic for a follow-up appointment for lower back pain. Since the last visit, Robert Munoz states the pain has been persistant. Current pain intensity is 8/10.  He continues locates the pain to the lumbosacral region with radiation into the extremities bilaterally, mostly at night.  He has tried many different conservative treatments including medications, physical therapy and undressed without much resolution of his pain/symptoms.    Patient denies night fever/night sweats, urinary incontinence, bowel incontinence, significant weight loss, significant motor weakness, and loss of sensations.    Pain Disability Index Review:   No flowsheet data found.    Initial HPI 08/19/2021:  Robert Munoz is a 56 y.o. male who presents to the clinic for the evaluation of lower back pain.  He was referred by his primary care provider for further evaluation management of this pain.  He has a past medical history of hypertension, morbid obesity, and multiple other medical comorbidities as listed in his chart.  The pain started 20+ ago following an injury he sustained while working on a roof that caved in and collapsed and symptoms have been unchanged.The pain is located in the lumbosacral area and radiates to the bilateral hips occasionally.  The pain is described as Aching and is rated as 6/10. The pain is rated with a score of  5/10 on the BEST day and a score of 10/10 on the WORST day.  Symptoms interfere with daily activity. The pain is exacerbated by movement.  The pain is mitigated by medications and rest. The patient reports spending 2-4 hours per day reclining. The patient reports 5-7 hours of uninterrupted sleep per night.        Non-Pharmacologic Treatments:  Physical Therapy/Home Exercise:  yes  Ice/Heat:yes  TENS: no  Acupuncture: no  Massage: no  Chiropractic: no    Other: no        Pain Medications:  - Opioids: Suboxone  - Adjuvant Medications: Denies  - Anti-Coagulants: None      report:  Reviewed and consistent with medication use as prescribed.       Pain Procedures:   Denies        Imaging:   MRI lumbar spine 11/27/2020:  There is mild dextroscoliosis of the lumbar spine.  There is a few mm of anterolisthesis of L5 on S1. The vertebral body heights are well maintained, with no fracture.  A few multilevel Schmorl's nodes are noted.  No marrow signal abnormality suspicious for an infiltrative process.     The conus medullaris terminates at approximately the L1-L2 disk space.  The adjacent soft tissue structures show no significant abnormalities.  There is disc desiccation noted throughout the lumbar spine with mild-to-moderate multilevel disc space narrowing.     L1-L2: Mild diffuse disc bulge resulting in no significant central or neural foraminal canal narrowing.     L2-L3: Mild diffuse disc bulge resulting in no significant central canal narrowing.  There is mild narrowing of the inferior recess of the right neural foraminal canal.     L3-L4: Mild diffuse disc bulge resulting in no significant central or neural foraminal canal narrowing.     L4-L5:  Mild diffuse disc bulge and moderate bilateral facet arthropathy resulting in no significant central canal narrowing.  There is some narrowing of either lateral recess.  The left neural foraminal canal is severely narrowed.  The right neural foraminal canal is at least moderately narrowed.     L5-S1:  Grade 1 spondylolisthesis resulting in no significant central canal narrowing.  Prominent epidural fat surrounding the thecal sac at this level.  The left neural foraminal canal is minimally narrowed.  The right neural foraminal canal is at least moderately narrowed.     X-ray lumbar spine 11/17/2020:  Mild dextroscoliosis.  Vertebral body heights  maintained without definite spondylolisthesis.  Multilevel disc height loss noted most prevalent L3-4.  Multilevel osteophyte findings present, largest at the right aspect of the L1-2 level.  Mid and lower lumbar spine facet arthropathy noted.  No acute osseous or soft tissue abnormality.         PMHx,PSHx, Social history, and Family history:  I have reviewed the patient's medical, surgical, social, and family history in detail and updated the computerized patient record.    Review of patient's allergies indicates:  No Known Allergies    Current Outpatient Medications   Medication Sig    albuterol (PROVENTIL/VENTOLIN HFA) 90 mcg/actuation inhaler Inhale 2 puffs into the lungs every 4 (four) hours as needed for Wheezing.    carvediloL (COREG) 6.25 MG tablet Take 1 tablet (6.25 mg total) by mouth 2 (two) times daily with meals.    ferrous gluconate (FERGON) 324 MG tablet Take 1 tablet (324 mg total) by mouth 2 (two) times daily with meals.    fluticasone propionate (FLONASE) 50 mcg/actuation nasal spray 1 spray (50 mcg total) by Each Nostril route once daily.    hydroCHLOROthiazide (HYDRODIURIL) 25 MG tablet     levocetirizine (XYZAL) 5 MG tablet Take 1 tablet (5 mg total) by mouth every evening.    losartan (COZAAR) 100 MG tablet     NIFEdipine (PROCARDIA-XL) 30 MG (OSM) 24 hr tablet Take 1 tablet (30 mg total) by mouth every evening.    predniSONE (DELTASONE) 20 MG tablet Take 1 tablet (20 mg total) by mouth As instructed. Take 4 tabs x 3 day, then 3 tabs x 4 day, then 2 tabs x 4 days, then 1 tab x 5 days.    SUBOXONE 12-3 mg Film Place 0.5 Film under the tongue 3 (three) times daily.    valsartan-hydrochlorothiazide (DIOVAN-HCT) 320-12.5 mg per tablet Take 1 tablet by mouth once daily.     No current facility-administered medications for this visit.         REVIEW OF SYSTEMS:    GENERAL:  No weight loss, malaise or fevers.  HEENT:   No recent changes in vision or hearing  NECK:  Negative for lumps, no difficulty  "with swallowing.  RESPIRATORY:  Negative for cough, wheezing or shortness of breath, patient denies any recent URI.  CARDIOVASCULAR:  Negative for chest pain, leg swelling or palpitations.  GI:  Negative for abdominal discomfort, blood in stools or black stools or change in bowel habits.  MUSCULOSKELETAL:  See HPI.  SKIN:  Negative for lesions, rash, and itching.  PSYCH:  No mood disorder or recent psychosocial stressors.  Patients sleep is not disturbed secondary to pain.  HEMATOLOGY/LYMPHOLOGY:  Negative for prolonged bleeding, bruising easily or swollen nodes.  Patient is not currently taking any anti-coagulants  NEURO:   No history of headaches, syncope, paralysis, seizures or tremors.  All other reviewed and negative other than HPI.    OBJECTIVE:    BP (!) 148/85   Pulse 82   Resp 17   Ht 5' 11" (1.803 m)   Wt (!) 170.5 kg (375 lb 15.9 oz)   BMI 52.44 kg/m²     PHYSICAL EXAMINATION:       GENERAL: Well appearing, in no acute distress, alert and oriented x3.  Morbidly obese  PSYCH:  Mood and affect appropriate.  SKIN: Skin color, texture, turgor normal, no rashes or lesions.  HEAD/FACE:  Normocephalic, atraumatic. Cranial nerves grossly intact.  CV: RRR with palpation of the radial artery.  PULM: No evidence of respiratory difficulty, symmetric chest rise.  GI:  Soft and non-tender.  BACK: Straight leg raising in the sitting and supine positions is equivocal to radicular pain.  Moderate pain to palpation over the facet joints of the lumbar spine and lumbar paraspinals.  Limited range of motion secondary to body habitus and pain reproduction.  EXTREMITIES:No deformities, edema, or skin discoloration. Good capillary refill.  MUSCULOSKELETAL:  Hip and knee provocative maneuvers are unremarkable.  There is minimal pain with palpation over the sacroiliac joints bilaterally.   Bilateral upper and lower extremity strength is normal and symmetric.  No atrophy or tone abnormalities are noted.  NEURO: Bilateral upper " and lower extremity coordination and muscle stretch reflexes are physiologic and symmetric.  Plantar response are downgoing. No clonus.  No loss of sensation is noted.  GAIT:  Slow, antalgic.         LABS:  Lab Results   Component Value Date    WBC 13.60 (H) 11/03/2022    HGB 12.8 (L) 11/03/2022    HCT 41.0 11/03/2022    MCV 92 11/03/2022     11/03/2022       CMP  Sodium   Date Value Ref Range Status   11/03/2022 135 (L) 136 - 145 mmol/L Final     Potassium   Date Value Ref Range Status   11/03/2022 3.9 3.5 - 5.1 mmol/L Final     Chloride   Date Value Ref Range Status   11/03/2022 101 95 - 110 mmol/L Final     CO2   Date Value Ref Range Status   11/03/2022 24 23 - 29 mmol/L Final     Glucose   Date Value Ref Range Status   11/03/2022 117 (H) 70 - 110 mg/dL Final     BUN   Date Value Ref Range Status   11/03/2022 22 (H) 6 - 20 mg/dL Final     Creatinine   Date Value Ref Range Status   11/03/2022 1.0 0.5 - 1.4 mg/dL Final     Calcium   Date Value Ref Range Status   11/03/2022 9.7 8.7 - 10.5 mg/dL Final     Total Protein   Date Value Ref Range Status   11/03/2022 7.2 6.0 - 8.4 g/dL Final     Albumin   Date Value Ref Range Status   11/03/2022 3.5 3.5 - 5.2 g/dL Final     Total Bilirubin   Date Value Ref Range Status   11/03/2022 0.4 0.1 - 1.0 mg/dL Final     Comment:     For infants and newborns, interpretation of results should be based  on gestational age, weight and in agreement with clinical  observations.    Premature Infant recommended reference ranges:  Up to 24 hours.............<8.0 mg/dL  Up to 48 hours............<12.0 mg/dL  3-5 days..................<15.0 mg/dL  6-29 days.................<15.0 mg/dL       Alkaline Phosphatase   Date Value Ref Range Status   11/03/2022 87 55 - 135 U/L Final     AST   Date Value Ref Range Status   11/03/2022 15 10 - 40 U/L Final     ALT   Date Value Ref Range Status   11/03/2022 23 10 - 44 U/L Final     Anion Gap   Date Value Ref Range Status   11/03/2022 10 8 - 16  mmol/L Final     eGFR if    Date Value Ref Range Status   06/03/2022 >60.0 >60 mL/min/1.73 m^2 Final     eGFR if non    Date Value Ref Range Status   06/03/2022 >60.0 >60 mL/min/1.73 m^2 Final     Comment:     Calculation used to obtain the estimated glomerular filtration  rate (eGFR) is the CKD-EPI equation.          Lab Results   Component Value Date    HGBA1C 5.8 (H) 06/03/2022             ASSESSMENT: 57 y.o. year old male with chronic lower back and leg pain, consistent with     1. Lumbar radiculopathy  IR Epidural Transfor 1st Vert Lumbar Julian    Case Request-RAD/Other Procedure Area: Bilateral L4/5 TF QING            PLAN:   - Interventions:  Scheduled for bilateral L4-5 TFESI for diagnostic and therapeutic purposes.. Explained the risks and benefits of the procedure in detail with the patient today in clinic along with alternative treatment options, and the patient elected to pursue the intervention at this time.       - Anticoagulation use: no      - Medications: I have stressed the importance of physical activity and a home exercise plan to help with pain and improve health., Patient can continue with medications for now since they are providing benefits, using them appropriately, and without side effects. and Start Neurontin 300mg gradually to three times a day to help with radicular pain.      He may continue with Suboxone from outside provider         - Therapy:  Advised patient continue with activities and home exercises as tolerated     - Labs:  Reviewed     - Imaging: Reviewed available imaging with patient and answered any questions they had regarding study.     - Consults/Referrals:  None at this time     - Records:  Reviewed/Obtain old records from outside physicians and imaging     - Follow up visit: return to clinic 4 weeks post procedure     - Counseled patient regarding the importance of activity modification and physical therapy     - This condition does not  require this patient to take time off of work, and the primary goal of our Pain Management services is to improve the patient's functional capacity.     - Patient Questions: Answered all of the patient's questions regarding diagnosis, therapy, and treatment    The above plan and management options were discussed at length with patient. Patient is in agreement with the above and verbalized understanding.      Dony Bean MD  Interventional Pain Management  [facility]    Disclaimer:  This note was prepared using voice recognition system and is likely to have sound alike errors that may have been overlooked even after proof reading.  Please call me with any questions

## 2023-01-06 ENCOUNTER — PATIENT MESSAGE (OUTPATIENT)
Dept: PAIN MEDICINE | Facility: HOSPITAL | Age: 58
End: 2023-01-06
Payer: MEDICARE

## 2023-01-06 NOTE — PRE-PROCEDURE INSTRUCTIONS
BMI 52. LVM with return phone number for IV sedation clearance. No return call at this time.  Message sent via portal.

## 2023-01-10 NOTE — PRE-PROCEDURE INSTRUCTIONS
BMI 52. PAT NP appt scheduled for weds. January 18th at 1300 at [facility]. Pt verbalized understanding.

## 2023-01-18 ENCOUNTER — HOSPITAL ENCOUNTER (OUTPATIENT)
Dept: PREADMISSION TESTING | Facility: HOSPITAL | Age: 58
Discharge: HOME | End: 2023-01-18
Attending: PHYSICAL MEDICINE & REHABILITATION
Payer: MEDICARE

## 2023-01-18 ENCOUNTER — TELEPHONE (OUTPATIENT)
Dept: FAMILY MEDICINE | Facility: CLINIC | Age: 58
End: 2023-01-18
Payer: MEDICARE

## 2023-01-18 DIAGNOSIS — R06.09 DOE (DYSPNEA ON EXERTION): Primary | ICD-10-CM

## 2023-01-18 NOTE — TELEPHONE ENCOUNTER
----- Message from Deborah Mcclellan sent at 1/18/2023  2:02 PM CST -----  Contact: [facility]  .Type:  Sooner Apoointment Request    Caller is requesting a sooner appointment.  Caller declined first available appointment listed below.  Caller will not accept being placed on the waitlist and is requesting a message be sent to doctor.  Name of Caller:  [facility] / Selena   When is the first available appointment? 6/2023   Symptoms:  wheezing   Would the patient rather a call back or a response via [facility]? Call   Best Call Back Number: [phone]    Additional Information:

## 2023-01-18 NOTE — CARE UPDATE
Mr. 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 Bean     Patient seen and examined, ambulatory, and per report is in usual state of health with the only complaint being LBP ; pt has c/o R LE bone spur     Patient denies any dizziness/lightheadedness, headaches, sore throat, dysphagia, , fever, chills, CP , cough, abdomainl pain, N/V/D, dysuria, extremity swelling, abnormal bleeding, and all other symptoms at this time. + nasal congestion chronic ; pt reports mild SOB with short distances no Sob with going to bathroom in AM ; can walk to mailbox 50 feet will need to rest upon return ; uses albuterol daily, uses at night to go to sleep .SOB stable over last 3 months.  LE edema improved since medication change 8-9 months ago ; no open wounds LE ; will worsen depending upon diet.     Patient reports is able to climb 1 flight of stairs with back pain , and is not affected by CP. Denies SOB with 6 steps at home ; with flight of steps may have SOB. .  Patient reports that pain is interfering with ADLs.  Aggravated by walking, prolonged standing, doing laundry .  Alleviated by rest and warm bath .   Patient denies any prior h/o complications related to sedation and anesthesia, with no h/o airway compromise noted or reported.  Colonoscopy no prolonged need for O2 Full ROM noted to neck.  Based on the Modified Mallampati classification for difficult laryngoscopy and intubation patient is a Class III     Pt wheezing on exam today, will need to be seen pre op by PCP to address wheezing prior to procedure. Pt also with history of sigmoid shaped septum  vs mild asymmetric septal hypertrophy on echo 2020 with no follow up. Will have pt see cards to r/o possibility of hypertrophic cardiomyopathy. If pt does have hypertrophic cardiomyopathy will need  to schedule procedure at [facility] location.

## 2023-01-19 ENCOUNTER — PATIENT MESSAGE (OUTPATIENT)
Dept: FAMILY MEDICINE | Facility: CLINIC | Age: 58
End: 2023-01-19
Payer: MEDICARE

## 2023-01-19 ENCOUNTER — PATIENT MESSAGE (OUTPATIENT)
Dept: PAIN MEDICINE | Facility: HOSPITAL | Age: 58
End: 2023-01-19
Payer: MEDICARE

## 2023-01-23 ENCOUNTER — OFFICE VISIT (OUTPATIENT)
Dept: FAMILY MEDICINE | Facility: CLINIC | Age: 58
End: 2023-01-23
Payer: MEDICARE

## 2023-01-23 VITALS
DIASTOLIC BLOOD PRESSURE: 64 MMHG | TEMPERATURE: 97 F | SYSTOLIC BLOOD PRESSURE: 131 MMHG | HEIGHT: 71 IN | HEART RATE: 85 BPM | WEIGHT: 315 LBS | OXYGEN SATURATION: 97 % | BODY MASS INDEX: 44.1 KG/M2

## 2023-01-23 DIAGNOSIS — J20.9 ACUTE BRONCHITIS, UNSPECIFIED ORGANISM: Primary | ICD-10-CM

## 2023-01-23 PROCEDURE — 99214 PR OFFICE/OUTPT VISIT, EST, LEVL IV, 30-39 MIN: ICD-10-PCS | Mod: S$PBB,,, | Performed by: FAMILY MEDICINE

## 2023-01-23 PROCEDURE — 99999 PR PBB SHADOW E&M-EST. PATIENT-LVL III: CPT | Mod: PBBFAC,,, | Performed by: FAMILY MEDICINE

## 2023-01-23 PROCEDURE — 99213 OFFICE O/P EST LOW 20 MIN: CPT | Mod: PBBFAC,PO | Performed by: FAMILY MEDICINE

## 2023-01-23 PROCEDURE — 99999 PR PBB SHADOW E&M-EST. PATIENT-LVL III: ICD-10-PCS | Mod: PBBFAC,,, | Performed by: FAMILY MEDICINE

## 2023-01-23 PROCEDURE — 99214 OFFICE O/P EST MOD 30 MIN: CPT | Mod: S$PBB,,, | Performed by: FAMILY MEDICINE

## 2023-01-23 RX ORDER — PREDNISONE 20 MG/1
20 TABLET ORAL SEE ADMIN INSTRUCTIONS
Qty: 45 TABLET | Refills: 0 | Status: SHIPPED | OUTPATIENT
Start: 2023-01-23 | End: 2023-02-20

## 2023-01-23 NOTE — PROGRESS NOTES
Chief Complaint:    Chief Complaint   Patient presents with    Wheezing       History of Present Illness:  Patient with Hx of asthma, HTN, and HODA presents to clinic for wheezing.     Was sick for 3 days roughly 3 weeks ago. Was wheezing and coughing. Was denied his steroid shots for his back. Is still wheezing. Uses inhaler in the AM and PM which helps.     ROS:  Review of Systems   Constitutional:  Negative for appetite change, chills and fever.   HENT:  Negative for congestion, ear pain, postnasal drip, rhinorrhea, sinus pressure and sinus pain.    Eyes:  Negative for pain.   Respiratory:  Negative for cough, chest tightness and shortness of breath.    Cardiovascular:  Negative for chest pain and palpitations.   Gastrointestinal:  Negative for abdominal pain, blood in stool, constipation, diarrhea and nausea.   Genitourinary:  Negative for difficulty urinating, dysuria, flank pain and hematuria.   Musculoskeletal:  Negative for arthralgias and myalgias.   Skin:  Negative for pallor and wound.   Neurological:  Negative for dizziness, tremors, speech difficulty, light-headedness and headaches.   Psychiatric/Behavioral:  Negative for behavioral problems, dysphoric mood and sleep disturbance. The patient is not nervous/anxious.    All other systems reviewed and are negative.    Past Medical History:   Diagnosis Date    Asthma     Hypertension     Sleep apnea        Social History:  Social History     Socioeconomic History    Marital status:    Tobacco Use    Smoking status: Never    Smokeless tobacco: Current     Types: Snuff    Tobacco comments:     1 can daily    Substance and Sexual Activity    Alcohol use: No    Drug use: No    Sexual activity: Yes       Family History:   family history includes Anuerysm in his mother; Diabetes in his mother; Heart disease in his father.    Health Maintenance   Topic Date Due    Lipid Panel  06/03/2027    TETANUS VACCINE  10/04/2031    Hepatitis C Screening  Completed  "      Physical Exam:    Vital Signs  Temp: 97.1 °F (36.2 °C)  Temp src: Tympanic  Pulse: 85  SpO2: 97 %  BP: 131/64  BP Location: Left arm  Patient Position: Sitting  Pain Score: 0-No pain  Height and Weight  Height: 5' 11" (180.3 cm)  Weight: (!) 172 kg (379 lb 1.3 oz)  BSA (Calculated - sq m): 2.93 sq meters  BMI (Calculated): 52.9  Weight in (lb) to have BMI = 25: 178.9]    Body mass index is 52.87 kg/m².    Physical Exam  Vitals and nursing note reviewed.   Constitutional:       Appearance: Normal appearance.   HENT:      Head: Normocephalic and atraumatic.      Right Ear: Tympanic membrane normal.      Left Ear: Tympanic membrane normal.   Eyes:      Extraocular Movements: Extraocular movements intact.      Pupils: Pupils are equal, round, and reactive to light.   Cardiovascular:      Rate and Rhythm: Normal rate and regular rhythm.      Pulses: Normal pulses.      Heart sounds: Normal heart sounds. No murmur heard.    No gallop.   Pulmonary:      Effort: Pulmonary effort is normal. No respiratory distress.      Breath sounds: Wheezing present. No rhonchi or rales.   Abdominal:      General: There is no distension.      Palpations: Abdomen is soft.      Tenderness: There is no abdominal tenderness.   Musculoskeletal:         General: No swelling, deformity or signs of injury. Normal range of motion.      Cervical back: Normal range of motion.   Skin:     General: Skin is warm and dry.      Capillary Refill: Capillary refill takes less than 2 seconds.      Coloration: Skin is not jaundiced or pale.   Neurological:      General: No focal deficit present.      Mental Status: He is alert and oriented to person, place, and time.   Psychiatric:         Mood and Affect: Mood normal.         Behavior: Behavior normal.         Assessment:      ICD-10-CM ICD-9-CM   1. Acute bronchitis, unspecified organism  J20.9 466.0       Plan:     Recommend he take his inhaler every 4hrs  Start Prednisone in a tapering " dose.        Orders Placed This Encounter    predniSONE (DELTASONE) 20 MG tablet        Current Outpatient Medications   Medication Sig Dispense Refill    albuterol (PROVENTIL/VENTOLIN HFA) 90 mcg/actuation inhaler Inhale 2 puffs into the lungs every 4 (four) hours as needed for Wheezing. 18 g 2    carvediloL (COREG) 6.25 MG tablet Take 1 tablet (6.25 mg total) by mouth 2 (two) times daily with meals. 60 tablet 5    ferrous gluconate (FERGON) 324 MG tablet Take 1 tablet (324 mg total) by mouth 2 (two) times daily with meals. 180 tablet 5    fluticasone propionate (FLONASE) 50 mcg/actuation nasal spray 1 spray (50 mcg total) by Each Nostril route once daily. 16 g 3    hydroCHLOROthiazide (HYDRODIURIL) 25 MG tablet       levocetirizine (XYZAL) 5 MG tablet Take 1 tablet (5 mg total) by mouth every evening. 30 tablet 11    losartan (COZAAR) 100 MG tablet       NIFEdipine (PROCARDIA-XL) 30 MG (OSM) 24 hr tablet Take 1 tablet (30 mg total) by mouth every evening. 30 tablet 11    predniSONE (DELTASONE) 20 MG tablet Take 1 tablet (20 mg total) by mouth As instructed. Take 4 tabs x 3 day, then 3 tabs x 4 day, then 2 tabs x 4 days, then 1 tab x 5 days. 37 tablet 0    SUBOXONE 12-3 mg Film Place 0.5 Film under the tongue 3 (three) times daily.      valsartan-hydrochlorothiazide (DIOVAN-HCT) 320-12.5 mg per tablet Take 1 tablet by mouth once daily. 90 tablet 3    predniSONE (DELTASONE) 20 MG tablet Take 1 tablet (20 mg total) by mouth As instructed. Take 4 tabs x 3 day, then 3 tabs x 4 day, then 2 tabs x 4 days, then 1 tab x 5 days. 45 tablet 0     No current facility-administered medications for this visit.       There are no discontinued medications.    No follow-ups on file.      Alisia Guardado MD   Scribe Attestation:   IJasson, am scribing for, and in the presence of, Dr.Arif Guardado I performed the above scribed service and the documentation accurately describes the services I performed. I attest to the accuracy of  the note.    I, Dr. Alisia Guardado, reviewed documentation as scribed above. I performed the services described in this documentation.  I agree that the record reflects my personal performance and is accurate and complete. Alisia Guardado MD.  01/06/2023

## 2023-01-31 ENCOUNTER — PATIENT MESSAGE (OUTPATIENT)
Dept: FAMILY MEDICINE | Facility: CLINIC | Age: 58
End: 2023-01-31
Payer: MEDICARE

## 2023-01-31 ENCOUNTER — PATIENT MESSAGE (OUTPATIENT)
Dept: PAIN MEDICINE | Facility: HOSPITAL | Age: 58
End: 2023-01-31
Payer: MEDICARE

## 2023-01-31 ENCOUNTER — PATIENT MESSAGE (OUTPATIENT)
Dept: PULMONOLOGY | Facility: CLINIC | Age: 58
End: 2023-01-31
Payer: MEDICARE

## 2023-01-31 DIAGNOSIS — I10 HYPERTENSION NOT AT GOAL: ICD-10-CM

## 2023-01-31 DIAGNOSIS — G47.30 SLEEP APNEA, UNSPECIFIED TYPE: Primary | ICD-10-CM

## 2023-02-01 ENCOUNTER — PATIENT MESSAGE (OUTPATIENT)
Dept: CARDIOLOGY | Facility: CLINIC | Age: 58
End: 2023-02-01
Payer: MEDICARE

## 2023-02-03 ENCOUNTER — TELEPHONE (OUTPATIENT)
Dept: CARDIOLOGY | Facility: CLINIC | Age: 58
End: 2023-02-03
Payer: MEDICARE

## 2023-02-03 NOTE — TELEPHONE ENCOUNTER
Called to speak to the pt about the date time and location of his appt with dr ross. The pt vm was full and I was unable to leave a message pb

## 2023-02-13 ENCOUNTER — OFFICE VISIT (OUTPATIENT)
Dept: FAMILY MEDICINE | Facility: CLINIC | Age: 58
End: 2023-02-13
Payer: MEDICARE

## 2023-02-13 VITALS
DIASTOLIC BLOOD PRESSURE: 82 MMHG | HEIGHT: 71 IN | HEART RATE: 90 BPM | WEIGHT: 315 LBS | BODY MASS INDEX: 44.1 KG/M2 | OXYGEN SATURATION: 98 % | SYSTOLIC BLOOD PRESSURE: 124 MMHG

## 2023-02-13 DIAGNOSIS — B96.89 ACUTE BACTERIAL SINUSITIS: ICD-10-CM

## 2023-02-13 DIAGNOSIS — I10 HYPERTENSION, UNSPECIFIED TYPE: ICD-10-CM

## 2023-02-13 DIAGNOSIS — E66.01 MORBID OBESITY WITH BMI OF 50.0-59.9, ADULT: ICD-10-CM

## 2023-02-13 DIAGNOSIS — J01.90 ACUTE BACTERIAL SINUSITIS: ICD-10-CM

## 2023-02-13 DIAGNOSIS — J45.990 EXERCISE-INDUCED ASTHMA: Primary | ICD-10-CM

## 2023-02-13 PROCEDURE — 99999 PR PBB SHADOW E&M-EST. PATIENT-LVL III: ICD-10-PCS | Mod: PBBFAC,,, | Performed by: FAMILY MEDICINE

## 2023-02-13 PROCEDURE — 99999 PR PBB SHADOW E&M-EST. PATIENT-LVL III: CPT | Mod: PBBFAC,,, | Performed by: FAMILY MEDICINE

## 2023-02-13 PROCEDURE — 99213 OFFICE O/P EST LOW 20 MIN: CPT | Mod: PBBFAC,PO | Performed by: FAMILY MEDICINE

## 2023-02-13 PROCEDURE — 99214 OFFICE O/P EST MOD 30 MIN: CPT | Mod: S$PBB,,, | Performed by: FAMILY MEDICINE

## 2023-02-13 PROCEDURE — 99214 PR OFFICE/OUTPT VISIT, EST, LEVL IV, 30-39 MIN: ICD-10-PCS | Mod: S$PBB,,, | Performed by: FAMILY MEDICINE

## 2023-02-13 RX ORDER — AMOXICILLIN 875 MG/1
875 TABLET, FILM COATED ORAL EVERY 12 HOURS
Qty: 14 TABLET | Refills: 0 | Status: SHIPPED | OUTPATIENT
Start: 2023-02-13 | End: 2023-02-20

## 2023-02-13 NOTE — PROGRESS NOTES
Chief Complaint:    Chief Complaint   Patient presents with    Follow-up     Discuss lab and c/o back pain        History of Present Illness:  Patient with Hx of asthma, HTN, and HODA presents to clinic for wheezing.     He says the prednisone helped him greatly, but he says he gets wheezing with exertion.    Also has some sinus congestion which is ongoing.    Blood pressure stable    ROS:  Review of Systems   Constitutional:  Negative for appetite change, chills and fever.   HENT:  Negative for congestion, ear pain, postnasal drip, rhinorrhea, sinus pressure and sinus pain.    Eyes:  Negative for pain.   Respiratory:  Negative for cough, chest tightness and shortness of breath.    Cardiovascular:  Negative for chest pain and palpitations.   Gastrointestinal:  Negative for abdominal pain, blood in stool, constipation, diarrhea and nausea.   Genitourinary:  Negative for difficulty urinating, dysuria, flank pain and hematuria.   Musculoskeletal:  Negative for arthralgias and myalgias.   Skin:  Negative for pallor and wound.   Neurological:  Negative for dizziness, tremors, speech difficulty, light-headedness and headaches.   Psychiatric/Behavioral:  Negative for behavioral problems, dysphoric mood and sleep disturbance. The patient is not nervous/anxious.    All other systems reviewed and are negative.    Past Medical History:   Diagnosis Date    Asthma     Hypertension     Sleep apnea        Social History:  Social History     Socioeconomic History    Marital status:    Tobacco Use    Smoking status: Never    Smokeless tobacco: Current     Types: Snuff    Tobacco comments:     1 can daily    Substance and Sexual Activity    Alcohol use: No    Drug use: No    Sexual activity: Yes       Family History:   family history includes Anuerysm in his mother; Diabetes in his mother; Heart disease in his father.    Health Maintenance   Topic Date Due    Lipid Panel  06/03/2027    TETANUS VACCINE  10/04/2031    Hepatitis  "C Screening  Completed       Physical Exam:    Vital Signs  Pulse: 90  SpO2: 98 %  BP: 124/82  Height and Weight  Height: 5' 11" (180.3 cm)  Weight: (!) 176.8 kg (389 lb 12.4 oz)  BSA (Calculated - sq m): 2.98 sq meters  BMI (Calculated): 54.4  Weight in (lb) to have BMI = 25: 178.9]    Body mass index is 54.36 kg/m².    Physical Exam  Vitals and nursing note reviewed.   Constitutional:       Appearance: Normal appearance.   HENT:      Head: Normocephalic and atraumatic.      Right Ear: Tympanic membrane normal.      Left Ear: Tympanic membrane normal.   Eyes:      Extraocular Movements: Extraocular movements intact.      Pupils: Pupils are equal, round, and reactive to light.   Cardiovascular:      Rate and Rhythm: Normal rate and regular rhythm.      Pulses: Normal pulses.      Heart sounds: Normal heart sounds. No murmur heard.    No gallop.   Pulmonary:      Effort: Pulmonary effort is normal. No respiratory distress.      Breath sounds: Wheezing present. No rhonchi or rales.   Abdominal:      General: There is no distension.      Palpations: Abdomen is soft.      Tenderness: There is no abdominal tenderness.   Musculoskeletal:         General: No swelling, deformity or signs of injury. Normal range of motion.      Cervical back: Normal range of motion.   Skin:     General: Skin is warm and dry.      Capillary Refill: Capillary refill takes less than 2 seconds.      Coloration: Skin is not jaundiced or pale.   Neurological:      General: No focal deficit present.      Mental Status: He is alert and oriented to person, place, and time.   Psychiatric:         Mood and Affect: Mood normal.         Behavior: Behavior normal.         Assessment:      ICD-10-CM ICD-9-CM   1. Exercise-induced asthma  J45.990 493.81   2. Morbid obesity with BMI of 50.0-59.9, adult  E66.01 278.01    Z68.43 V85.43   3. Hypertension, unspecified type  I10 401.9   4. Acute bacterial sinusitis  J01.90 461.9    B96.89        Plan:       It " appears that he may have an underlying case exercise-induced asthma, recommend using albuterol inhaler 2 puffs before each exertional episode.  If this does not help consider adding Flovent twice a day   Prescription for antibiotics and for any underlying sinusitis   Continue to monitor home blood pressure readings   Work on weight loss      Orders Placed This Encounter    amoxicillin (AMOXIL) 875 MG tablet          Current Outpatient Medications   Medication Sig Dispense Refill    albuterol (PROVENTIL/VENTOLIN HFA) 90 mcg/actuation inhaler Inhale 2 puffs into the lungs every 4 (four) hours as needed for Wheezing. 18 g 2    carvediloL (COREG) 6.25 MG tablet Take 1 tablet (6.25 mg total) by mouth 2 (two) times daily with meals. 60 tablet 5    ferrous gluconate (FERGON) 324 MG tablet Take 1 tablet (324 mg total) by mouth 2 (two) times daily with meals. 180 tablet 5    fluticasone propionate (FLONASE) 50 mcg/actuation nasal spray 1 spray (50 mcg total) by Each Nostril route once daily. 16 g 3    levocetirizine (XYZAL) 5 MG tablet Take 1 tablet (5 mg total) by mouth every evening. 30 tablet 11    NIFEdipine (PROCARDIA-XL) 30 MG (OSM) 24 hr tablet Take 1 tablet (30 mg total) by mouth every evening. 30 tablet 11    SUBOXONE 12-3 mg Film Place 0.5 Film under the tongue 3 (three) times daily.      valsartan-hydrochlorothiazide (DIOVAN-HCT) 320-12.5 mg per tablet Take 1 tablet by mouth once daily. 90 tablet 3    amoxicillin (AMOXIL) 875 MG tablet Take 1 tablet (875 mg total) by mouth every 12 (twelve) hours. for 7 days 14 tablet 0    predniSONE (DELTASONE) 20 MG tablet Take 1 tablet (20 mg total) by mouth As instructed. Take 4 tabs x 3 day, then 3 tabs x 4 day, then 2 tabs x 4 days, then 1 tab x 5 days. (Patient not taking: Reported on 2/13/2023) 45 tablet 0     No current facility-administered medications for this visit.       There are no discontinued medications.    No follow-ups on file.      MD Ella Raines  Attestation:   I, Jasson Marquez, am scribing for, and in the presence of, Dr.Arif Guardado I performed the above scribed service and the documentation accurately describes the services I performed. I attest to the accuracy of the note.    I, Dr. Alisia Guardado, reviewed documentation as scribed above. I performed the services described in this documentation.  I agree that the record reflects my personal performance and is accurate and complete. Alisia Guardado MD.  01/06/2023

## 2023-02-16 ENCOUNTER — PATIENT MESSAGE (OUTPATIENT)
Dept: PULMONOLOGY | Facility: CLINIC | Age: 58
End: 2023-02-16
Payer: MEDICARE

## 2023-02-17 DIAGNOSIS — R06.02 SOB (SHORTNESS OF BREATH): ICD-10-CM

## 2023-02-17 DIAGNOSIS — Z76.89 ESTABLISHING CARE WITH NEW DOCTOR, ENCOUNTER FOR: Primary | ICD-10-CM

## 2023-02-20 ENCOUNTER — HOSPITAL ENCOUNTER (OUTPATIENT)
Dept: CARDIOLOGY | Facility: HOSPITAL | Age: 58
Discharge: HOME | End: 2023-02-20
Attending: STUDENT IN AN ORGANIZED HEALTH CARE EDUCATION/TRAINING PROGRAM
Payer: MEDICARE

## 2023-02-20 ENCOUNTER — OFFICE VISIT (OUTPATIENT)
Dept: CARDIOLOGY | Facility: CLINIC | Age: 58
End: 2023-02-20
Payer: MEDICARE

## 2023-02-20 VITALS
HEIGHT: 71 IN | BODY MASS INDEX: 44.1 KG/M2 | RESPIRATION RATE: 16 BRPM | OXYGEN SATURATION: 92 % | HEART RATE: 108 BPM | DIASTOLIC BLOOD PRESSURE: 77 MMHG | SYSTOLIC BLOOD PRESSURE: 130 MMHG | WEIGHT: 315 LBS

## 2023-02-20 DIAGNOSIS — E66.01 MORBID OBESITY WITH BMI OF 50.0-59.9, ADULT: ICD-10-CM

## 2023-02-20 DIAGNOSIS — Z72.0 CHEWS TOBACCO REGULARLY: ICD-10-CM

## 2023-02-20 DIAGNOSIS — M54.10 RADICULOPATHY, UNSPECIFIED SPINAL REGION: ICD-10-CM

## 2023-02-20 DIAGNOSIS — Z76.89 ESTABLISHING CARE WITH NEW DOCTOR, ENCOUNTER FOR: ICD-10-CM

## 2023-02-20 DIAGNOSIS — M79.89 LEG SWELLING: ICD-10-CM

## 2023-02-20 DIAGNOSIS — R06.02 SOB (SHORTNESS OF BREATH): ICD-10-CM

## 2023-02-20 DIAGNOSIS — I10 HYPERTENSION NOT AT GOAL: ICD-10-CM

## 2023-02-20 DIAGNOSIS — R06.09 DOE (DYSPNEA ON EXERTION): Primary | ICD-10-CM

## 2023-02-20 PROCEDURE — 99204 PR OFFICE/OUTPT VISIT, NEW, LEVL IV, 45-59 MIN: ICD-10-PCS | Mod: S$PBB,,, | Performed by: STUDENT IN AN ORGANIZED HEALTH CARE EDUCATION/TRAINING PROGRAM

## 2023-02-20 PROCEDURE — 93010 ELECTROCARDIOGRAM REPORT: CPT | Mod: ,,, | Performed by: INTERNAL MEDICINE

## 2023-02-20 PROCEDURE — 99999 PR PBB SHADOW E&M-EST. PATIENT-LVL V: CPT | Mod: PBBFAC,,, | Performed by: STUDENT IN AN ORGANIZED HEALTH CARE EDUCATION/TRAINING PROGRAM

## 2023-02-20 PROCEDURE — 93005 ELECTROCARDIOGRAM TRACING: CPT

## 2023-02-20 PROCEDURE — 99204 OFFICE O/P NEW MOD 45 MIN: CPT | Mod: S$PBB,,, | Performed by: STUDENT IN AN ORGANIZED HEALTH CARE EDUCATION/TRAINING PROGRAM

## 2023-02-20 PROCEDURE — 99215 OFFICE O/P EST HI 40 MIN: CPT | Mod: PBBFAC | Performed by: STUDENT IN AN ORGANIZED HEALTH CARE EDUCATION/TRAINING PROGRAM

## 2023-02-20 PROCEDURE — 99999 PR PBB SHADOW E&M-EST. PATIENT-LVL V: ICD-10-PCS | Mod: PBBFAC,,, | Performed by: STUDENT IN AN ORGANIZED HEALTH CARE EDUCATION/TRAINING PROGRAM

## 2023-02-20 PROCEDURE — 93010 EKG 12-LEAD: ICD-10-PCS | Mod: ,,, | Performed by: INTERNAL MEDICINE

## 2023-02-20 RX ORDER — AZELASTINE 1 MG/ML
1 SPRAY, METERED NASAL 2 TIMES DAILY
COMMUNITY
End: 2023-04-10

## 2023-02-20 RX ORDER — NEBIVOLOL 20 MG/1
20 TABLET ORAL DAILY
Qty: 30 TABLET | Refills: 6 | Status: SHIPPED | OUTPATIENT
Start: 2023-02-20 | End: 2023-08-25

## 2023-02-20 RX ORDER — VALSARTAN AND HYDROCHLOROTHIAZIDE 320; 25 MG/1; MG/1
1 TABLET, FILM COATED ORAL DAILY
Qty: 90 TABLET | Refills: 3 | Status: SHIPPED | OUTPATIENT
Start: 2023-02-20 | End: 2024-03-11

## 2023-02-20 NOTE — PROGRESS NOTES
Section of Cardiology                  Cardiac Clinic Note    Chief Complaint/Reason for consultation: establish care       HPI:   Robert Munoz is a 57 y.o. male with h/o MALIA, tobacco ab use, HTN, obesity, chronic back pain who was rferred to cardiology clinic by PCP Dr. Guardado for evaluation.     2/20/23  Comes in to establish care  Knows he needs to lose weight  Does not exercise much   Gets a little SOB, but doesn't walk much due to back pain  If walks a long distance, gets SOB  Was placed on inhalers for possible asthma   Reports LE swelling in his feet, takes nifedipine   Drinks tea, trying to get reduce f sugar intake   Trying to drink more water   Since 10/22 has gained 17 lbs  Has significant swelling in legs, thought due to burning from detergent recently     Denies chest pain, syncope, palpitations, PND, orthopnea     Family history: dad- ETOH, heart attacks     Reports dipping tobacco, denies ETOH abuse (stopped long time ago)  Denies CVA, diabetes     EKG 2/20/23 ST, no acute ST - T wave changes    ECHO      STRESS TEST    [facility]      ROS: All 10 systems reviewed. Please refer to the HPI for pertinent positives. All other systems negative.     Past Medical History  Past Medical History:   Diagnosis Date    Asthma     Hypertension     Sleep apnea        Surgical History  Past Surgical History:   Procedure Laterality Date    COLONOSCOPY N/A 10/13/2021    Procedure: COLONOSCOPY;  Surgeon: Eula Dominguez MD;  Location: [facility];  Service: Endoscopy;  Laterality: N/A;    ESOPHAGOGASTRODUODENOSCOPY N/A 10/27/2022    Procedure: EGD (ESOPHAGOGASTRODUODENOSCOPY);  Surgeon: Juliette Sawant MD;  Location: [facility];  Service: Endoscopy;  Laterality: N/A;          Allergies:   Review of patient's allergies indicates:  No Known Allergies    Social History:  Social History     Socioeconomic History    Marital status:    Tobacco Use    Smoking status: Never    Smokeless tobacco: Current      Types: Snuff    Tobacco comments:     1 can daily    Substance and Sexual Activity    Alcohol use: No    Drug use: No    Sexual activity: Yes       Family History:  family history includes Anuerysm in his mother; Diabetes in his mother; Heart disease in his father.    Home Medications:  Current Outpatient Medications on File Prior to Visit   Medication Sig Dispense Refill    albuterol (PROVENTIL/VENTOLIN HFA) 90 mcg/actuation inhaler Inhale 2 puffs into the lungs every 4 (four) hours as needed for Wheezing. 18 g 2    amoxicillin (AMOXIL) 875 MG tablet Take 1 tablet (875 mg total) by mouth every 12 (twelve) hours. for 7 days 14 tablet 0    azelastine (ASTELIN) 137 mcg (0.1 %) nasal spray 1 spray by Nasal route 2 (two) times daily.      ferrous gluconate (FERGON) 324 MG tablet Take 1 tablet (324 mg total) by mouth 2 (two) times daily with meals. 180 tablet 5    fluticasone propionate (FLONASE) 50 mcg/actuation nasal spray 1 spray (50 mcg total) by Each Nostril route once daily. 16 g 3    SUBOXONE 12-3 mg Film Place 0.5 Film under the tongue 3 (three) times daily.      [DISCONTINUED] NIFEdipine (PROCARDIA-XL) 30 MG (OSM) 24 hr tablet Take 1 tablet (30 mg total) by mouth every evening. 30 tablet 11    [DISCONTINUED] valsartan-hydrochlorothiazide (DIOVAN-HCT) 320-12.5 mg per tablet Take 1 tablet by mouth once daily. 90 tablet 3    levocetirizine (XYZAL) 5 MG tablet Take 1 tablet (5 mg total) by mouth every evening. 30 tablet 11    [DISCONTINUED] carvediloL (COREG) 6.25 MG tablet Take 1 tablet (6.25 mg total) by mouth 2 (two) times daily with meals. (Patient not taking: Reported on 2/20/2023) 60 tablet 5    [DISCONTINUED] predniSONE (DELTASONE) 20 MG tablet Take 1 tablet (20 mg total) by mouth As instructed. Take 4 tabs x 3 day, then 3 tabs x 4 day, then 2 tabs x 4 days, then 1 tab x 5 days. (Patient not taking: Reported on 2/13/2023) 45 tablet 0     No current facility-administered medications on file prior to visit.  "      Physical exam:  /77   Pulse 108   Resp 16   Ht 5' 11" (1.803 m)   Wt (!) 177.9 kg (392 lb 3.2 oz)   SpO2 (!) 92%   BMI 54.70 kg/m²         General: Pt is a 57 y.o. year old male who is AAOx3, in NAD, is pleasant, well nourished, looks stated age  HEENT: PERRL, EOMI, Oral mucosa pink & moist  CVS  No abnormal cardiac pulsations noted on inspection. JVP not raised. The apical impulse is normal on palpation, and is located in the left 5th intercostal space in the mid - clavicular line. No palpable thrills or abnormal pulsations noted. RR, S1 - S2 heard, no murmurs, rubs or gallops appreciated.   PUL : CTA B/L. + wheezing  ABD : BS +, soft. No tenderness elicited   LE : Venous dermastasis. 3+ edema  Distal Pulses palpable B/L         LABS:    Chemistry:   Lab Results   Component Value Date     (L) 11/03/2022    K 3.9 11/03/2022     11/03/2022    CO2 24 11/03/2022    BUN 22 (H) 11/03/2022    CREATININE 1.0 11/03/2022    CALCIUM 9.7 11/03/2022     Cardiac Markers: No results found for: CKTOTAL, CKMB, CKMBINDEX, TROPONINI  Cardiac Markers (Last 3): No results found for: CKTOTAL, CKMB, CKMBINDEX, TROPONINI  CBC:   Lab Results   Component Value Date    WBC 13.60 (H) 11/03/2022    HGB 12.8 (L) 11/03/2022    HCT 41.0 11/03/2022    MCV 92 11/03/2022     11/03/2022     Lipids:   Lab Results   Component Value Date    CHOL 119 (L) 06/03/2022    TRIG 93 06/03/2022    HDL 29 (L) 06/03/2022     Coagulation: No results found for: PT, INR, APTT        Assessment        1. VIDAL (dyspnea on exertion)    2. Hypertension not at goal    3. Morbid obesity with BMI of 50.0-59.9, adult    4. Chews tobacco regularly    5. Leg swelling    6. Radiculopathy, unspecified spinal region         Plan:    VIDAL/LE swelling   Weight gain over the last few months, significant LE swelling  Stop nifedipine (started 10 /22)  Start bystolic 20 mg daily  Increase hctz 25 in Diovan-hctz  Given inhalers for possible asthma "     Tobacco abuse  Dipping cessation encouraged    Chronic back pain  Will be getting spinal injections     HTN  Stable  Continue diovan-hctz, start bystolic  Stop nifedipine    Obesity, BMI 50-54.9  Low salt, low fat diet  Exercise as tolerated, at least 30 min daily        This note was prepared using voice recognition system and is likely to have sound alike errors that may have been overlooked even after proofreading.     I have reviewed all pertinent chart information.  Plans and recommendations have been formulated under my direct supervision. All questions answered and patient voiced understanding.   If symptoms persist go to the ED.    RTC in 2 months         Matty Hassan MD  Cardiology

## 2023-02-21 ENCOUNTER — OFFICE VISIT (OUTPATIENT)
Dept: PAIN MEDICINE | Facility: CLINIC | Age: 58
End: 2023-02-21
Payer: MEDICARE

## 2023-02-21 VITALS
SYSTOLIC BLOOD PRESSURE: 153 MMHG | HEIGHT: 71 IN | BODY MASS INDEX: 44.1 KG/M2 | HEART RATE: 95 BPM | DIASTOLIC BLOOD PRESSURE: 77 MMHG | WEIGHT: 315 LBS

## 2023-02-21 DIAGNOSIS — M48.061 SPINAL STENOSIS OF LUMBAR REGION WITHOUT NEUROGENIC CLAUDICATION: ICD-10-CM

## 2023-02-21 DIAGNOSIS — M47.816 LUMBAR SPONDYLOSIS: ICD-10-CM

## 2023-02-21 DIAGNOSIS — M54.16 LUMBAR RADICULAR PAIN: Primary | ICD-10-CM

## 2023-02-21 DIAGNOSIS — M51.36 DDD (DEGENERATIVE DISC DISEASE), LUMBAR: ICD-10-CM

## 2023-02-21 PROCEDURE — 99214 PR OFFICE/OUTPT VISIT, EST, LEVL IV, 30-39 MIN: ICD-10-PCS | Mod: S$PBB,,, | Performed by: PHYSICIAN ASSISTANT

## 2023-02-21 PROCEDURE — 99214 OFFICE O/P EST MOD 30 MIN: CPT | Mod: S$PBB,,, | Performed by: PHYSICIAN ASSISTANT

## 2023-02-21 PROCEDURE — 99999 PR PBB SHADOW E&M-EST. PATIENT-LVL III: ICD-10-PCS | Mod: PBBFAC,,, | Performed by: PHYSICIAN ASSISTANT

## 2023-02-21 PROCEDURE — 99999 PR PBB SHADOW E&M-EST. PATIENT-LVL III: CPT | Mod: PBBFAC,,, | Performed by: PHYSICIAN ASSISTANT

## 2023-02-21 PROCEDURE — 99213 OFFICE O/P EST LOW 20 MIN: CPT | Mod: PBBFAC | Performed by: PHYSICIAN ASSISTANT

## 2023-02-21 NOTE — PROGRESS NOTES
Established Patient Chronic Pain Note (Follow up visit)    Chief Complaint:   Chief Complaint   Patient presents with    Low-back Pain     Follow up    lumbosacral area and radiates to the bilateral hips occasionally      SUBJECTIVE:    Interval History (2/21/2023):  Robert Munoz presents today for follow-up visit.  Patient was last seen on 1/4/2023 (6 weeks ago). At that visit, the plan was to schedule injection, but it wasn't completed. Patient reports pain as 3/10 today.  Pain significantly increases with any activity including bending over to do laundry or dishes.  Pain increases to 9/10.  That he was unable to complete the injection previously because of uncontrolled blood pressure and wheezing.  Since then, he saw primary care and Cardiology, and his wheezing and shortness of breath has improved with Medrol Dosepak.    Interval HPI (1/4/2023):  Robert Munoz is a 57 y.o. male who presents to the clinic for a follow-up appointment for lower back pain. Since the last visit, Robert Munoz states the pain has been persistant. Current pain intensity is 8/10.  He continues locates the pain to the lumbosacral region with radiation into the extremities bilaterally, mostly at night.  He has tried many different conservative treatments including medications, physical therapy and undressed without much resolution of his pain/symptoms.  Patient denies night fever/night sweats, urinary incontinence, bowel incontinence, significant weight loss, significant motor weakness, and loss of sensations.    Initial HPI 08/19/2021:  Robert Munoz is a 56 y.o. male who presents to the clinic for the evaluation of lower back pain.  He was referred by his primary care provider for further evaluation management of this pain.  He has a past medical history of hypertension, morbid obesity, and multiple other medical comorbidities as listed in his chart.  The pain started 20+ ago following an injury he sustained while working on a roof  that caved in and collapsed and symptoms have been unchanged. The pain is located in the lumbosacral area and radiates to the bilateral hips occasionally.  The pain is described as Aching and is rated as 6/10. The pain is rated with a score of  5/10 on the BEST day and a score of 10/10 on the WORST day.  Symptoms interfere with daily activity. The pain is exacerbated by movement.  The pain is mitigated by medications and rest. The patient reports spending 2-4 hours per day reclining. The patient reports 5-7 hours of uninterrupted sleep per night.     Pain Disability Index Review:   No flowsheet data found.       Non-Pharmacologic Treatments:  Physical Therapy/Home Exercise: yes  Ice/Heat:yes  TENS: no  Acupuncture: no  Massage: no  Chiropractic: no    Other: no        Pain Medications:  - Opioids: Suboxone  - Adjuvant Medications: Denies  - Anti-Coagulants: None       Pain Procedures:   Denies        Imaging (Reviewed on 2/21/2023):     MRI lumbar spine 11/27/2020:  There is mild dextroscoliosis of the lumbar spine.  There is a few mm of anterolisthesis of L5 on S1. The vertebral body heights are well maintained, with no fracture.  A few multilevel Schmorl's nodes are noted.  No marrow signal abnormality suspicious for an infiltrative process.     The conus medullaris terminates at approximately the L1-L2 disk space.  The adjacent soft tissue structures show no significant abnormalities.  There is disc desiccation noted throughout the lumbar spine with mild-to-moderate multilevel disc space narrowing.     L1-L2: Mild diffuse disc bulge resulting in no significant central or neural foraminal canal narrowing.     L2-L3: Mild diffuse disc bulge resulting in no significant central canal narrowing.  There is mild narrowing of the inferior recess of the right neural foraminal canal.     L3-L4: Mild diffuse disc bulge resulting in no significant central or neural foraminal canal narrowing.     L4-L5:  Mild diffuse disc  bulge and moderate bilateral facet arthropathy resulting in no significant central canal narrowing.  There is some narrowing of either lateral recess.  The left neural foraminal canal is severely narrowed.  The right neural foraminal canal is at least moderately narrowed.     L5-S1:  Grade 1 spondylolisthesis resulting in no significant central canal narrowing.  Prominent epidural fat surrounding the thecal sac at this level.  The left neural foraminal canal is minimally narrowed.  The right neural foraminal canal is at least moderately narrowed.     X-ray lumbar spine 11/17/2020:  Mild dextroscoliosis.  Vertebral body heights maintained without definite spondylolisthesis.  Multilevel disc height loss noted most prevalent L3-4.  Multilevel osteophyte findings present, largest at the right aspect of the L1-2 level.  Mid and lower lumbar spine facet arthropathy noted.  No acute osseous or soft tissue abnormality.             REVIEW OF SYSTEMS:    GENERAL:  No weight loss, malaise or fevers.  HEENT:   No recent changes in vision or hearing  NECK:  Negative for lumps, no difficulty with swallowing.  RESPIRATORY:  Negative for cough, wheezing or shortness of breath, patient denies any recent URI.  CARDIOVASCULAR:  Negative for chest pain, leg swelling or palpitations.  GI:  Negative for abdominal discomfort, blood in stools or black stools or change in bowel habits.  MUSCULOSKELETAL:  See HPI.  SKIN:  Negative for lesions, rash, and itching.  PSYCH:  No mood disorder or recent psychosocial stressors.  Patients sleep is not disturbed secondary to pain.  HEMATOLOGY/LYMPHOLOGY:  Negative for prolonged bleeding, bruising easily or swollen nodes.  Patient is not currently taking any anti-coagulants  NEURO:   No history of headaches, syncope, paralysis, seizures or tremors.  All other reviewed and negative other than HPI.      OBJECTIVE:    Vitals:    02/21/23 1330   BP: (!) 153/77   Pulse: 95   Weight: (!) 170.2 kg (375 lb  "3.6 oz)   Height: 5' 11" (1.803 m)   PainSc:   3   PainLoc: Back    Body mass index is 52.33 kg/m².   (reviewed on 2/21/2023)  PHYSICAL EXAMINATION:       GENERAL: Well appearing, in no acute distress, alert and oriented x3.  Morbidly obese  PSYCH:  Mood and affect appropriate.  SKIN: Skin color, texture, turgor normal, no rashes or lesions.  HEAD/FACE:  Normocephalic, atraumatic.    PULM: No evidence of respiratory difficulty, symmetric chest rise.  GI:  Soft and non-tender.  BACK: SLR is equivocal to radicular pain.  Moderate pain to palpation over the facet joints of the lumbar spine and lumbar paraspinals.  Limited range of motion secondary to body habitus and pain reproduction.  EXTREMITIES:No deformities, edema, or skin discoloration. Good capillary refill.  MUSCULOSKELETAL:  Hip and knee provocative maneuvers are unremarkable.  There is minimal pain with palpation over the sacroiliac joints bilaterally.  BUE & BLE strength is normal and symmetric.  No atrophy or tone abnormalities are noted.  NEURO: BUE & BLE coordination and muscle stretch reflexes are physiologic and symmetric.  Plantar response are downgoing. No clonus.  No loss of sensation is noted.  GAIT:  Slow, antalgic.               ASSESSMENT: 57 y.o. year old male with chronic lower back and leg pain, consistent with     1. Lumbar radicular pain        2. Lumbar spondylosis        3. DDD (degenerative disc disease), lumbar        4. Spinal stenosis of lumbar region without neurogenic claudication              PLAN:   1. Interventional: Proceed with Bilateral L4/5 TF QING - as scheduled on 2/23/23.    2. Pharmacologic:   - D/c gabapentin (Neurontin) 300mg TID - no longer taking due to SE of itching, brain fog. Consider trying Lyrica.  - He may continue with Suboxone from outside provider.   - Anticoagulation use: None.  - LA  reviewed and appropriate.       3. Rehabilitative: Encouraged regular exercise. Continue exercises and activities as " tolerated.     4. Diagnostic: None for now.    5. Follow up: 4 weeks post-injection - will send online ticket scheduling on MyChart - in clinic (per pt request)     - This condition does not require this patient to take time off of work, and the primary goal of our Pain Management services is to improve the patient's functional capacity.   - I discussed the risks, benefits, and alternatives to potential treatment options. All questions and concerns were fully addressed today in clinic.         Sherly Esteves PA-C  Interventional Pain Management - [facility]    Disclaimer:  This note was prepared using voice recognition system and is likely to have sound alike errors that may have been overlooked even after proof reading.  Please call me with any questions.

## 2023-02-22 ENCOUNTER — PATIENT MESSAGE (OUTPATIENT)
Dept: PAIN MEDICINE | Facility: CLINIC | Age: 58
End: 2023-02-22
Payer: MEDICARE

## 2023-02-23 ENCOUNTER — TELEPHONE (OUTPATIENT)
Dept: PAIN MEDICINE | Facility: CLINIC | Age: 58
End: 2023-02-23
Payer: MEDICARE

## 2023-02-24 ENCOUNTER — TELEPHONE (OUTPATIENT)
Dept: OPHTHALMOLOGY | Facility: CLINIC | Age: 58
End: 2023-02-24
Payer: MEDICARE

## 2023-02-24 NOTE — TELEPHONE ENCOUNTER
Called patient at 4:24 02/24/2023 no answer left message for patient to call back to schedule appointment.

## 2023-02-24 NOTE — TELEPHONE ENCOUNTER
----- Message from Jake White sent at 2/24/2023  4:14 PM CST -----  Contact: Robert Jones is requesting a call back to reschedule his missed appointment .Please call him back at [phone]

## 2023-02-27 ENCOUNTER — HOSPITAL ENCOUNTER (OUTPATIENT)
Dept: CARDIOLOGY | Facility: HOSPITAL | Age: 58
Discharge: HOME | End: 2023-02-27
Attending: STUDENT IN AN ORGANIZED HEALTH CARE EDUCATION/TRAINING PROGRAM
Payer: MEDICARE

## 2023-02-27 VITALS
WEIGHT: 315 LBS | SYSTOLIC BLOOD PRESSURE: 153 MMHG | HEIGHT: 71 IN | DIASTOLIC BLOOD PRESSURE: 77 MMHG | BODY MASS INDEX: 44.1 KG/M2

## 2023-02-27 DIAGNOSIS — M79.89 LEG SWELLING: ICD-10-CM

## 2023-02-27 DIAGNOSIS — I10 HYPERTENSION NOT AT GOAL: ICD-10-CM

## 2023-02-27 DIAGNOSIS — Z72.0 CHEWS TOBACCO REGULARLY: ICD-10-CM

## 2023-02-27 DIAGNOSIS — R06.09 DOE (DYSPNEA ON EXERTION): ICD-10-CM

## 2023-02-27 DIAGNOSIS — E66.01 MORBID OBESITY WITH BMI OF 50.0-59.9, ADULT: ICD-10-CM

## 2023-02-27 PROCEDURE — 93306 ECHO (CUPID ONLY): ICD-10-PCS | Mod: 26,,, | Performed by: INTERNAL MEDICINE

## 2023-02-27 PROCEDURE — 93306 TTE W/DOPPLER COMPLETE: CPT

## 2023-02-27 PROCEDURE — 93306 TTE W/DOPPLER COMPLETE: CPT | Mod: 26,,, | Performed by: INTERNAL MEDICINE

## 2023-02-28 LAB
AORTIC ROOT ANNULUS: 2.96 CM
ASCENDING AORTA: 3.63 CM
AV INDEX (PROSTH): 0.86
AV MEAN GRADIENT: 4 MMHG
AV PEAK GRADIENT: 7 MMHG
AV VALVE AREA: 2.97 CM2
AV VELOCITY RATIO: 0.79
BSA FOR ECHO PROCEDURE: 2.92 M2
CV ECHO LV RWT: 0.55 CM
DOP CALC AO PEAK VEL: 1.31 M/S
DOP CALC AO VTI: 28 CM
DOP CALC LVOT AREA: 3.5 CM2
DOP CALC LVOT DIAMETER: 2.1 CM
DOP CALC LVOT PEAK VEL: 1.04 M/S
DOP CALC LVOT STROKE VOLUME: 83.08 CM3
DOP CALC RVOT PEAK VEL: 1.06 M/S
DOP CALC RVOT VTI: 24.2 CM
DOP CALCLVOT PEAK VEL VTI: 24 CM
E WAVE DECELERATION TIME: 176.19 MSEC
E/A RATIO: 0.98
E/E' RATIO: 7.85 M/S
ECHO LV POSTERIOR WALL: 1.28 CM (ref 0.6–1.1)
EJECTION FRACTION: 55 %
FRACTIONAL SHORTENING: 36 % (ref 28–44)
INTERVENTRICULAR SEPTUM: 1.41 CM (ref 0.6–1.1)
IVC DIAMETER: 19 CM
IVRT: 71.36 MSEC
LA MAJOR: 5.87 CM
LA MINOR: 5.42 CM
LA WIDTH: 4.3 CM
LEFT ATRIUM SIZE: 4.22 CM
LEFT ATRIUM VOLUME INDEX MOD: 32.4 ML/M2
LEFT ATRIUM VOLUME INDEX: 31.6 ML/M2
LEFT ATRIUM VOLUME MOD: 89.12 CM3
LEFT ATRIUM VOLUME: 86.93 CM3
LEFT INTERNAL DIMENSION IN SYSTOLE: 2.99 CM (ref 2.1–4)
LEFT VENTRICLE DIASTOLIC VOLUME INDEX: 36.88 ML/M2
LEFT VENTRICLE DIASTOLIC VOLUME: 101.41 ML
LEFT VENTRICLE MASS INDEX: 90 G/M2
LEFT VENTRICLE SYSTOLIC VOLUME INDEX: 12.6 ML/M2
LEFT VENTRICLE SYSTOLIC VOLUME: 34.59 ML
LEFT VENTRICULAR INTERNAL DIMENSION IN DIASTOLE: 4.68 CM (ref 3.5–6)
LEFT VENTRICULAR MASS: 248.38 G
LV LATERAL E/E' RATIO: 9.27 M/S
LV SEPTAL E/E' RATIO: 6.8 M/S
LVOT MG: 2.58 MMHG
LVOT MV: 0.76 CM/S
MV PEAK A VEL: 1.04 M/S
MV PEAK E VEL: 1.02 M/S
MV STENOSIS PRESSURE HALF TIME: 51.1 MS
MV VALVE AREA P 1/2 METHOD: 4.31 CM2
PISA TR MAX VEL: 1.97 M/S
PV MEAN GRADIENT: 2.31 MMHG
PV PEAK VELOCITY: 1.31 CM/S
RA MAJOR: 5.13 CM
RA PRESSURE: 8 MMHG
RA WIDTH: 4.31 CM
RIGHT VENTRICULAR END-DIASTOLIC DIMENSION: 3.36 CM
SINUS: 3.23 CM
STJ: 2.88 CM
TDI LATERAL: 0.11 M/S
TDI SEPTAL: 0.15 M/S
TDI: 0.13 M/S
TR MAX PG: 16 MMHG
TRICUSPID ANNULAR PLANE SYSTOLIC EXCURSION: 2.85 CM
TV REST PULMONARY ARTERY PRESSURE: 24 MMHG

## 2023-03-08 ENCOUNTER — TELEPHONE (OUTPATIENT)
Dept: PULMONOLOGY | Facility: CLINIC | Age: 58
End: 2023-03-08
Payer: MEDICARE

## 2023-03-08 NOTE — PRE-PROCEDURE INSTRUCTIONS
Attempted to PAT patient for procedure on 3.16 with Dr. Bean. No answer. M with return phone number. No return call at this time.

## 2023-03-09 ENCOUNTER — OFFICE VISIT (OUTPATIENT)
Dept: PULMONOLOGY | Facility: CLINIC | Age: 58
End: 2023-03-09
Payer: MEDICARE

## 2023-03-09 ENCOUNTER — CLINICAL SUPPORT (OUTPATIENT)
Dept: PULMONOLOGY | Facility: CLINIC | Age: 58
End: 2023-03-09
Payer: MEDICARE

## 2023-03-09 ENCOUNTER — HOSPITAL ENCOUNTER (OUTPATIENT)
Dept: RADIOLOGY | Facility: HOSPITAL | Age: 58
Discharge: HOME | End: 2023-03-09
Attending: PHYSICIAN ASSISTANT
Payer: MEDICARE

## 2023-03-09 VITALS
BODY MASS INDEX: 44.1 KG/M2 | DIASTOLIC BLOOD PRESSURE: 78 MMHG | SYSTOLIC BLOOD PRESSURE: 138 MMHG | RESPIRATION RATE: 18 BRPM | OXYGEN SATURATION: 96 % | HEIGHT: 71 IN | HEART RATE: 84 BPM | WEIGHT: 315 LBS

## 2023-03-09 DIAGNOSIS — Z01.811 ENCOUNTER FOR PRE-OPERATIVE RESPIRATORY CLEARANCE: ICD-10-CM

## 2023-03-09 DIAGNOSIS — Z01.811 ENCOUNTER FOR PRE-OPERATIVE RESPIRATORY CLEARANCE: Primary | ICD-10-CM

## 2023-03-09 DIAGNOSIS — E66.01 MORBID OBESITY WITH BMI OF 50.0-59.9, ADULT: ICD-10-CM

## 2023-03-09 DIAGNOSIS — G47.30 SLEEP APNEA, UNSPECIFIED TYPE: ICD-10-CM

## 2023-03-09 LAB
ALLENS TEST: ABNORMAL
DELSYS: ABNORMAL
FIO2: 21
HCO3 UR-SCNC: 27.4 MMOL/L (ref 24–28)
MODE: ABNORMAL
PCO2 BLDA: 45.6 MMHG (ref 35–45)
PH SMN: 7.39 [PH] (ref 7.35–7.45)
PO2 BLDA: 80 MMHG (ref 80–100)
POC BE: 2 MMOL/L
POC SATURATED O2: 95 % (ref 95–100)
SAMPLE: ABNORMAL
SITE: ABNORMAL

## 2023-03-09 PROCEDURE — 71046 X-RAY EXAM CHEST 2 VIEWS: CPT | Mod: 26,,, | Performed by: STUDENT IN AN ORGANIZED HEALTH CARE EDUCATION/TRAINING PROGRAM

## 2023-03-09 PROCEDURE — 36600 WITHDRAWAL OF ARTERIAL BLOOD: CPT | Mod: S$PBB,,, | Performed by: INTERNAL MEDICINE

## 2023-03-09 PROCEDURE — 36600 PR WITHDRAWAL OF ARTERIAL BLOOD: ICD-10-PCS | Mod: S$PBB,,, | Performed by: INTERNAL MEDICINE

## 2023-03-09 PROCEDURE — 99214 PR OFFICE/OUTPT VISIT, EST, LEVL IV, 30-39 MIN: ICD-10-PCS | Mod: 25,S$PBB,ICN, | Performed by: PHYSICIAN ASSISTANT

## 2023-03-09 PROCEDURE — 71046 XR CHEST PA AND LATERAL: ICD-10-PCS | Mod: 26,,, | Performed by: STUDENT IN AN ORGANIZED HEALTH CARE EDUCATION/TRAINING PROGRAM

## 2023-03-09 PROCEDURE — 99999 PR PBB SHADOW E&M-EST. PATIENT-LVL III: ICD-10-PCS | Mod: PBBFAC,,, | Performed by: PHYSICIAN ASSISTANT

## 2023-03-09 PROCEDURE — 99213 OFFICE O/P EST LOW 20 MIN: CPT | Mod: PBBFAC,25 | Performed by: PHYSICIAN ASSISTANT

## 2023-03-09 PROCEDURE — 71046 X-RAY EXAM CHEST 2 VIEWS: CPT | Mod: TC

## 2023-03-09 PROCEDURE — 82803 BLOOD GASES ANY COMBINATION: CPT | Mod: PBBFAC

## 2023-03-09 PROCEDURE — 99214 OFFICE O/P EST MOD 30 MIN: CPT | Mod: 25,S$PBB,ICN, | Performed by: PHYSICIAN ASSISTANT

## 2023-03-09 PROCEDURE — 36600 WITHDRAWAL OF ARTERIAL BLOOD: CPT | Mod: PBBFAC

## 2023-03-09 PROCEDURE — 99999 PR PBB SHADOW E&M-EST. PATIENT-LVL III: CPT | Mod: PBBFAC,,, | Performed by: PHYSICIAN ASSISTANT

## 2023-03-09 NOTE — PROCEDURES
ABG completed. See ABG Below.    Component      Latest Ref Rng & Units 3/9/2023   POC PH      7.35 - 7.45 7.386   POC PCO2      35 - 45 mmHg 45.6 (H)   POC PO2      80 - 100 mmHg 80   POC HCO3      24 - 28 mmol/L 27.4   POC BE      -2 to 2 mmol/L 2   POC SATURATED O2      95 - 100 % 95   Sample       ARTERIAL   Site       RR   Allens Test       Pass   DelSys       Room Air   Mode       SPONT   FiO2       21         Interpretation:    [x] Arterial blood gases on room air are abnormal demonstrating hypercarbia (pCO2 >45 mmHg)      The table below summarizes the main interpretations of the relationship between the arterial blood gases, pH, pCO2 and HCO-3    []    I

## 2023-03-09 NOTE — PROGRESS NOTES
Subjective:       Patient ID: Robert Munoz is a 57 y.o. male.    Chief Complaint: Pulmonary Clearance    Here for pulmonary risk assessment visit  Scheduled next week for QING with Dr. Bean  New patient  No history of lung disease but reports episode last month of wheezing and congestion which was relived with prednisone  No inhalers  History of HODA on CPAP, does not wear CPAP as he says he does not feel like it helps his sleep  Wakes up with feeling like pressures too high  Says he sleeps well without CPAP but does have daytime fatigue  Never smoker  Plant work,   PFT 10/2022 without obstruction, restriction from obesity  He denies SOB, chest pain, fever, or cough  No history of surgeries    BP Readings from Last 3 Encounters:   03/09/23 138/78   02/27/23 (!) 153/77   02/21/23 (!) 153/77       [place] Sleepiness Scale TOTAL =   12  (validated sleepiness questionnaire with a higher score indicating greater sleepiness; range 0-24)      Immunization History   Administered Date(s) Administered    COVID-19, MRNA, LN-S, PF (Pfizer) (Purple Cap) 03/12/2021, 04/02/2021, 09/29/2021    Influenza (FLUAD) - Quadrivalent - Adjuvanted - PF *Preferred* (65+) 11/03/2022    Influenza - Quadrivalent - PF *Preferred* (6 months and older) 11/05/2018, 09/21/2019, 09/10/2020, 10/04/2021    Tdap 10/04/2021    Zoster Recombinant 10/04/2021      Tobacco Use: High Risk    Smoking Tobacco Use: Never    Smokeless Tobacco Use: Current    Passive Exposure: Not on file      Past Medical History:   Diagnosis Date    Asthma     Hypertension     Sleep apnea       Current Outpatient Medications on File Prior to Visit   Medication Sig Dispense Refill    albuterol (PROVENTIL/VENTOLIN HFA) 90 mcg/actuation inhaler Inhale 2 puffs into the lungs every 4 (four) hours as needed for Wheezing. 18 g 2    azelastine (ASTELIN) 137 mcg (0.1 %) nasal spray 1 spray by Nasal route 2 (two) times daily.      ferrous gluconate (FERGON) 324 MG tablet  "Take 1 tablet (324 mg total) by mouth 2 (two) times daily with meals. 180 tablet 5    fluticasone propionate (FLONASE) 50 mcg/actuation nasal spray 1 spray (50 mcg total) by Each Nostril route once daily. 16 g 3    levocetirizine (XYZAL) 5 MG tablet Take 1 tablet (5 mg total) by mouth every evening. 30 tablet 11    nebivoloL (BYSTOLIC) 20 mg Tab Take 20 mg by mouth once daily. 30 tablet 6    SUBOXONE 12-3 mg Film Place 0.5 Film under the tongue 3 (three) times daily.      valsartan-hydrochlorothiazide (DIOVAN-HCT) 320-25 mg per tablet Take 1 tablet by mouth once daily. 90 tablet 3     No current facility-administered medications on file prior to visit.        Review of Systems   Constitutional:  Negative for fever, weight loss, appetite change and weakness.   HENT:  Negative for rhinorrhea, sinus pressure and trouble swallowing.    Respiratory:  Positive for snoring and somnolence. Negative for cough, sputum production, choking, chest tightness, shortness of breath, wheezing and dyspnea on extertion.    Cardiovascular:  Negative for chest pain and leg swelling.   Musculoskeletal:  Positive for back pain. Negative for joint swelling.   Gastrointestinal:  Negative for nausea and vomiting.   Neurological:  Negative for dizziness, weakness and headaches.   All other systems reviewed and are negative.    Objective:       Vitals:    03/09/23 1346   BP: 138/78   Pulse: 84   Resp: 18   SpO2: 96%   Weight: (!) 173.8 kg (383 lb 2.6 oz)   Height: 5' 11" (1.803 m)       Physical Exam   Constitutional: He is oriented to person, place, and time. He appears well-developed and well-nourished. No distress. He is obese.   HENT:   Head: Normocephalic.   Nose: Nose normal.   Mouth/Throat: Oropharynx is clear and moist.   Cardiovascular: Normal rate and regular rhythm.   Pulmonary/Chest: Effort normal. No respiratory distress. He has no wheezes. He has no rhonchi. He has no rales.   Musculoskeletal:         General: Edema (chronic " bilateral) present.      Cervical back: Normal range of motion and neck supple.   Neurological: He is alert and oriented to person, place, and time.   Skin: Skin is warm and dry.   Psychiatric: He has a normal mood and affect.   Vitals reviewed.  Personal Diagnostic Review    X-Ray Chest PA And Lateral  Narrative: EXAM: XR CHEST PA AND LATERAL    CLINICAL HISTORY:  [Z01.811]-Encounter for preprocedural respiratory examination.    TECHNIQUE: PA and lateral views of the chest.    COMPARISON: X-ray dated 10/13/2022    FINDINGS:   Cardiomediastinal silhouette is within normal limits. Trachea is midline. Pulmonary vasculature is normal. Lungs are clear. No significant pleural effusion or pneumothorax. No acute bony abnormality.  Degenerative changes noted in the spine.  Impression: No acute cardiopulmonary abnormality.    Finalized on: 3/9/2023 1:40 PM By:  Mark Del Angel MD  [facility]# [id number]      2023-03-09 13:42:08.201    [facility]    PFT 10/2022: Spirometry is normal. Spirometry remains unimproved following bronchodilator. Lung volumes show mild restriction is present. Airway mechanics are abnormal showing increased airway resistance and decreased conductance. DLCO is normal. MVV is normal.     Recent Labs     03/09/23  1356   PH 7.386   PCO2 45.6*   PO2 80   HCO3 27.4   POCSATURATED 95   BE 2     PRE OPERATIVE PULMONARY RISK ASSESSMENT:      SURGEON: Dr. Bean  PROCEDURE: QING    Patient is a MILD risk for perioperative pulmonary complications due to:      []            Pulmonary Fibrosis            []            COPD                [x]            HODA  []            Pulmonary vascular disease   []            Pleural disease  []            Pulmonary vasculitis  [x]            Hypoventilation ( chest wall deformity, neuromuscular disease, obesity etc)  []            Anemia  []            Thyroid disease.  []            Cardiac illess  []            General Anesthsia  []            long-acting neuromuscular blockade.      Risks and possible pulmonary complications of surgery include risk of respiratory failure requiring mechanical ventilation, post operative pneumonia, post operative atelectasis, deep venous thrombosis, pulmonary embolism and death.    Based on my assessment , it is okay to proceed with procedure PROVIDED RISK IS ACCEPTABLE TO BOTH THE PATIENT AND THE SURGEON PERFORMING THE SURGERY.     ARISCAT Score 3       0 to 25 points: Low risk: 1.6% pulmonary complication rate   26 to 44 points: Intermediate risk: 13.3% pulmonary complication rate   45 to 123 points: High risk: 42.1% pulmonary complication rate            RECOMMENDATIONS FOR RISK MITIGATION:    1. Preoperative pulmonary workup:  complete  2. Change in medication regimen before surgery: none, continue medication regimen including morning of surgery, with sip of water.  3. Prophylaxis for cardiac events with perioperative beta-blockers: should be considered, specific regimen per anesthesia.  4. Invasive hemodynamic monitoring perioperatively: should be considered.  5. Deep vein thrombosis prophylaxis postoperatively:regimen to be chosen by surgical team.  6. Surveillance for postoperative MI with ECG immediately postoperatively and on postoperative days 1 and 2 AND troponin levels 24 hours postoperatively and on day 4 or hospital discharge (whichever comes first): should be considered.  7. Begin patient education regarding lung-expansion maneuvers like deep breathing and incentive spirometry.   8. Limit duration of surgery to less than 3 hr if possible.  9. Use spinal or epidural anesthesia if possible.  10. Avoid use of pancuronium or long acting neuromuscular blockers.  11. Use deep-breathing exercises or incentive spirometry.  12. Assure perioperative thromboprophylaxis and adequate post operative analgesia.  13. Bring CPAP to day of procedure         Assessment/Plan:       Problem List Items Addressed This Visit          Pulmonary    Encounter for  pre-operative respiratory clearance - Primary       Endocrine    Morbid obesity with BMI of 50.0-59.9, adult     Weight loss and exercise to improve overall health.              Other    Sleep apnea    Relevant Orders    Polysomnogram (CPAP will be added if patient meets diagnostic criteria.)   Risks and possible pulmonary complications of surgery include risk of respiratory failure requiring mechanical ventilation, post operative pneumonia, post operative atelectasis, deep venous thrombosis, pulmonary embolism and death.    Based on my assessment , it is okay to proceed with procedure PROVIDED RISK IS ACCEPTABLE TO BOTH THE PATIENT AND THE SURGEON PERFORMING THE SURGERY.       Follow up pending sleep study completion.    Discussed diagnosis, its evaluation, treatment and usual course. All questions answered.    Patient verbalized understanding of plan and left in no acute distress    Thank you for the courtesy of participating in the care of this patient    HOLLY SchmidB[facility]

## 2023-03-10 ENCOUNTER — TELEPHONE (OUTPATIENT)
Dept: PAIN MEDICINE | Facility: CLINIC | Age: 58
End: 2023-03-10
Payer: MEDICARE

## 2023-03-10 NOTE — TELEPHONE ENCOUNTER
----- Message from Ronnie Bradley MA sent at 2/23/2023  8:10 AM CST -----  Send a Message to Dr. My Gordon to get pulmonary clearance for procedure

## 2023-03-13 ENCOUNTER — PATIENT MESSAGE (OUTPATIENT)
Dept: PAIN MEDICINE | Facility: HOSPITAL | Age: 58
End: 2023-03-13
Payer: MEDICARE

## 2023-03-15 NOTE — PRE-PROCEDURE INSTRUCTIONS
Spoke with patient regarding procedure scheduled on 3.16     Arrival time 1200     Has patient been sick with fever or on antibiotics within the last 7 days? No     Does the patient have any open wounds, sores or rashes? No     Does the patient have any recent fractures? no     Has patient received a vaccination within the last 7 days? No     Received the COVID vaccination? yes     Has the patient stopped all medications as directed? na     Does patient have a pacemaker and or defibrillator? no     Does the patient have a ride to and from procedure and someone reliable to remain with patient? carlos      Is the patient diabetic? no     Does the patient have sleep apnea? Or use O2 at home? denies      Is the patient receiving sedation?      Is the patient instructed to remain NPO beginning at midnight the night before their procedure? yes     Procedure location confirmed with patient? Yes     Covid- Denies signs/symptoms. Instructed to notify PAT/MD if any changes.

## 2023-03-16 ENCOUNTER — HOSPITAL ENCOUNTER (OUTPATIENT)
Facility: HOSPITAL | Age: 58
Discharge: HOME | End: 2023-03-16
Attending: PHYSICAL MEDICINE & REHABILITATION | Admitting: PHYSICAL MEDICINE & REHABILITATION
Payer: MEDICARE

## 2023-03-16 VITALS
WEIGHT: 315 LBS | HEART RATE: 74 BPM | OXYGEN SATURATION: 97 % | HEIGHT: 71 IN | BODY MASS INDEX: 44.1 KG/M2 | TEMPERATURE: 98 F | DIASTOLIC BLOOD PRESSURE: 60 MMHG | RESPIRATION RATE: 16 BRPM | SYSTOLIC BLOOD PRESSURE: 124 MMHG

## 2023-03-16 DIAGNOSIS — M54.16 LUMBAR RADICULOPATHY: Primary | ICD-10-CM

## 2023-03-16 PROCEDURE — 64483 NJX AA&/STRD TFRM EPI L/S 1: CPT | Mod: 50 | Performed by: PHYSICAL MEDICINE & REHABILITATION

## 2023-03-16 PROCEDURE — 64483 NJX AA&/STRD TFRM EPI L/S 1: CPT | Mod: 50,,, | Performed by: PHYSICAL MEDICINE & REHABILITATION

## 2023-03-16 PROCEDURE — 25000003 PHARM REV CODE 250: Performed by: PHYSICAL MEDICINE & REHABILITATION

## 2023-03-16 PROCEDURE — 63600175 PHARM REV CODE 636 W HCPCS: Performed by: PHYSICAL MEDICINE & REHABILITATION

## 2023-03-16 PROCEDURE — 25500020 PHARM REV CODE 255: Performed by: PHYSICAL MEDICINE & REHABILITATION

## 2023-03-16 PROCEDURE — 64483 PR EPIDURAL INJ, ANES/STEROID, TRANSFORAMINAL, LUMB/SACR, SNGL LEVL: ICD-10-PCS | Mod: 50,,, | Performed by: PHYSICAL MEDICINE & REHABILITATION

## 2023-03-16 RX ORDER — ONDANSETRON 2 MG/ML
4 INJECTION INTRAMUSCULAR; INTRAVENOUS ONCE AS NEEDED
Status: ACTIVE | OUTPATIENT
Start: 2023-03-16 | End: 2034-08-12

## 2023-03-16 RX ORDER — BUPIVACAINE HYDROCHLORIDE 2.5 MG/ML
INJECTION, SOLUTION EPIDURAL; INFILTRATION; INTRACAUDAL
Status: DISCONTINUED | OUTPATIENT
Start: 2023-03-16 | End: 2023-03-16 | Stop reason: HOSPADM

## 2023-03-16 RX ORDER — MIDAZOLAM HYDROCHLORIDE 1 MG/ML
INJECTION, SOLUTION INTRAMUSCULAR; INTRAVENOUS
Status: DISCONTINUED | OUTPATIENT
Start: 2023-03-16 | End: 2023-03-16 | Stop reason: HOSPADM

## 2023-03-16 RX ORDER — FENTANYL CITRATE 50 UG/ML
INJECTION, SOLUTION INTRAMUSCULAR; INTRAVENOUS
Status: DISCONTINUED | OUTPATIENT
Start: 2023-03-16 | End: 2023-03-16 | Stop reason: HOSPADM

## 2023-03-16 RX ORDER — METHYLPREDNISOLONE ACETATE 40 MG/ML
INJECTION, SUSPENSION INTRA-ARTICULAR; INTRALESIONAL; INTRAMUSCULAR; SOFT TISSUE
Status: DISCONTINUED | OUTPATIENT
Start: 2023-03-16 | End: 2023-03-16 | Stop reason: HOSPADM

## 2023-03-16 NOTE — DISCHARGE INSTRUCTIONS

## 2023-03-16 NOTE — OP NOTE
INFORMED CONSENT: The procedure, risks, benefits and options were discussed with patient. There are no contraindications to the procedure. The patient expressed understanding and agreed to proceed. The personnel performing the procedure was discussed.    03/16/2023    Surgeon: Dony Bean MD    Assistants: None    Sedation: Conscious sedation provided by M.D    The patient was monitored with continuous pulse oximetry, EKG, and intermittent blood pressure monitors, immediately prior to administration of sedation.  The patient was hemodynamically stable throughout the entire process was responsive to voice, and breathing spontaneously.  Supplemental O2 was provided at 2L/min via nasal cannula.  Patient was comfortable for the duration of the procedure.     There was a total of 2mg IV Midazolam and 75mcg Fentanyl titrated for the procedure    Total sedation time was >10minutes and <20minutes      PROCEDURE:  Bilateral  L4/5  1) Left  L4/5 TRANSFORAMINAL EPIDURAL STEROID INJECTION  2) Right  L4/5 TRANSFORAMINAL EPIDURAL STEROID INJECTION      Pre Procedure diagnosis:  Bilateral L4/5 Lumbar radiculopathy [M54.16]    Post-Procedure diagnosis:   same    Complications: None    Specimens: None      DESCRIPTION OF PROCEDURE: The patient was brought to the procedure room. IV access was obtained prior to the procedure. The patient was positioned prone on the fluoroscopy table. Continuous hemodynamic monitoring was initiated including blood pressure, EKG, and pulse oximetry. . The skin was prepped with chlorhexidine and draped in a sterile fashion. Skin anesthesia was achieved using a total of 10mL of lidocaine, 5mL over each respective injection site.     The  L4/5 transforaminal spaces were identified with fluoroscopy in the  AP, oblique, and lateral views.  A 22 gauge spinal quinke needle was then advanced into the area of the trans foraminal spaces bilaterally with confirmation of proper needle position using AP,  oblique, and lateral fluoroscopic views. Once the needle tip was in the area of the transforaminal space, and there was no blood, CSF or paraesthesias,  1.5 mL of Omnipaque 300mg/ml was injected on each side for a total of 3mL.  Fluoroscopic imaging in the AP and lateral views revealed a clear outline of the spinal nerve with proximal spread of agent through the neural foramen into the epidural space. A total combination of 1 mL of Bupivicaine 0.25% and 40 mg depo medrol was injected on each side for a total of 4mL of injected medications with displacement of the contrast dye confirming that the medication went into the area of the transforaminal spaces bilaterally. A sterile dressing was applied.   Patient tolerated the procedure well.    Patient was taken back to the recovery room for further observation.     The patient was discharged to home in stable condition

## 2023-03-16 NOTE — DISCHARGE SUMMARY
Discharge Note  Short Stay      SUMMARY     Admit Date: 3/16/2023    Attending Physician: Dony Bean MD        Discharge Physician: Dony Bean MD        Discharge Date: 3/16/2023 5:13 PM    Procedure(s) (LRB):  Bilateral L4/5 TF QING (Bilateral)    Final Diagnosis: Lumbar radiculopathy [M54.16]    Disposition: Home or self care    Patient Instructions:   Discharge Medication List as of 3/16/2023 12:06 PM        CONTINUE these medications which have NOT CHANGED    Details   nebivoloL (BYSTOLIC) 20 mg Tab Take 20 mg by mouth once daily., Starting Mon 2/20/2023, Until Tue 2/20/2024, Normal      valsartan-hydrochlorothiazide (DIOVAN-HCT) 320-25 mg per tablet Take 1 tablet by mouth once daily., Starting Mon 2/20/2023, Until Tue 2/20/2024, Normal      albuterol (PROVENTIL/VENTOLIN HFA) 90 mcg/actuation inhaler Inhale 2 puffs into the lungs every 4 (four) hours as needed for Wheezing., Starting Tue 2/21/2023, Until Wed 2/21/2024 at 2359, Normal      azelastine (ASTELIN) 137 mcg (0.1 %) nasal spray 1 spray by Nasal route 2 (two) times daily., Historical Med      ferrous gluconate (FERGON) 324 MG tablet Take 1 tablet (324 mg total) by mouth 2 (two) times daily with meals., Starting Thu 11/10/2022, Normal      fluticasone propionate (FLONASE) 50 mcg/actuation nasal spray 1 spray (50 mcg total) by Each Nostril route once daily., Starting Mon 3/13/2023, Normal      levocetirizine (XYZAL) 5 MG tablet Take 1 tablet (5 mg total) by mouth every evening., Starting Wed 3/30/2022, Until Thu 3/30/2023, Normal      SUBOXONE 12-3 mg Film Place 0.5 Film under the tongue 3 (three) times daily., Starting Wed 9/2/2020, Historical Med                 Discharge Diagnosis: Lumbar radiculopathy [M54.16]  Condition on Discharge: Stable with no complications to procedure   Diet on Discharge: Same as before.  Activity: as per instruction sheet.  Discharge to: Home with a responsible adult.  Follow up: 2-4 weeks       Please call the  office at [phone] if you experience any weakness or loss of sensation, fever > 101.5, pain uncontrolled with oral medications, persistent nausea/vomiting/or diarrhea, redness or drainage from the incisions, or any other worrisome concerns. If physician on call was not reached or could not communicate with our office for any reason please go to the nearest emergency department

## 2023-03-16 NOTE — H&P
"HPI  Patient presenting for Procedure(s) (LRB):  Bilateral L4/5 TF QING (Bilateral)     Patient on Anti-coagulation Yes    No health changes since previous encounter    Past Medical History:   Diagnosis Date    Asthma     Hypertension     Sleep apnea      Past Surgical History:   Procedure Laterality Date    COLONOSCOPY N/A 10/13/2021    Procedure: COLONOSCOPY;  Surgeon: Eula Dominguez MD;  Location: [facility];  Service: Endoscopy;  Laterality: N/A;    ESOPHAGOGASTRODUODENOSCOPY N/A 10/27/2022    Procedure: EGD (ESOPHAGOGASTRODUODENOSCOPY);  Surgeon: Juliette Sawant MD;  Location: [facility];  Service: Endoscopy;  Laterality: N/A;     Review of patient's allergies indicates:  No Known Allergies     No current facility-administered medications on file prior to encounter.     Current Outpatient Medications on File Prior to Encounter   Medication Sig Dispense Refill    ferrous gluconate (FERGON) 324 MG tablet Take 1 tablet (324 mg total) by mouth 2 (two) times daily with meals. 180 tablet 5    levocetirizine (XYZAL) 5 MG tablet Take 1 tablet (5 mg total) by mouth every evening. 30 tablet 11    SUBOXONE 12-3 mg Film Place 0.5 Film under the tongue 3 (three) times daily.          PMHx, PSHx, Allergies, Medications reviewed in epic    ROS negative except pain complaints in HPI    OBJECTIVE:    BP (!) 177/82   Pulse 83   Temp 97.8 °F (36.6 °C) (Temporal)   Resp 16   Ht 5' 11" (1.803 m)   Wt (!) 174.7 kg (385 lb 4.1 oz)   SpO2 97%   BMI 53.73 kg/m²     PHYSICAL EXAMINATION:    GENERAL: Well appearing, in no acute distress, alert and oriented x3.  PSYCH:  Mood and affect appropriate.  SKIN: Skin color, texture, turgor normal, no rashes or lesions which will impact the procedure.  CV: RRR with palpation of the radial artery.  PULM: No evidence of respiratory difficulty, symmetric chest rise. Clear to auscultation.  NEURO: Cranial nerves grossly intact.    Plan:    Proceed with procedure as planned Procedure(s) " (LRB):  Bilateral L4/5 TF QING (Bilateral)    Dony Bean MD  03/16/2023

## 2023-03-23 ENCOUNTER — TELEPHONE (OUTPATIENT)
Dept: PAIN MEDICINE | Facility: CLINIC | Age: 58
End: 2023-03-23
Payer: MEDICARE

## 2023-03-23 NOTE — TELEPHONE ENCOUNTER
Called pt regarding appointment on 03/28 for inj follow up. Pt injection was 03/16, too soon to follow up. Called to reschedule appointment , no answer. LVM to call back at earliest convenience.

## 2023-04-10 ENCOUNTER — APPOINTMENT (OUTPATIENT)
Dept: RADIOLOGY | Facility: HOSPITAL | Age: 58
End: 2023-04-10
Attending: FAMILY MEDICINE
Payer: MEDICARE

## 2023-04-10 ENCOUNTER — OFFICE VISIT (OUTPATIENT)
Dept: FAMILY MEDICINE | Facility: CLINIC | Age: 58
End: 2023-04-10
Payer: MEDICARE

## 2023-04-10 VITALS
HEIGHT: 71 IN | DIASTOLIC BLOOD PRESSURE: 78 MMHG | SYSTOLIC BLOOD PRESSURE: 128 MMHG | BODY MASS INDEX: 44.1 KG/M2 | OXYGEN SATURATION: 97 % | WEIGHT: 315 LBS | TEMPERATURE: 98 F | HEART RATE: 88 BPM

## 2023-04-10 DIAGNOSIS — N39.43 POST-VOID DRIBBLING: ICD-10-CM

## 2023-04-10 DIAGNOSIS — B07.9 VIRAL WARTS, UNSPECIFIED TYPE: ICD-10-CM

## 2023-04-10 DIAGNOSIS — N39.43 POST-VOID DRIBBLING: Primary | ICD-10-CM

## 2023-04-10 DIAGNOSIS — I10 PRIMARY HYPERTENSION: ICD-10-CM

## 2023-04-10 PROCEDURE — 76857 US BLADDER: ICD-10-PCS | Mod: 26,,, | Performed by: RADIOLOGY

## 2023-04-10 PROCEDURE — 17000 DESTRUCT PREMALG LESION: CPT | Mod: PBBFAC,PO | Performed by: FAMILY MEDICINE

## 2023-04-10 PROCEDURE — 99214 PR OFFICE/OUTPT VISIT, EST, LEVL IV, 30-39 MIN: ICD-10-PCS | Mod: 25,S$PBB,ICN, | Performed by: FAMILY MEDICINE

## 2023-04-10 PROCEDURE — 99999 PR PBB SHADOW E&M-EST. PATIENT-LVL IV: ICD-10-PCS | Mod: PBBFAC,,, | Performed by: FAMILY MEDICINE

## 2023-04-10 PROCEDURE — 76857 US EXAM PELVIC LIMITED: CPT | Mod: TC,PO

## 2023-04-10 PROCEDURE — 17110 PR DESTRUCTION BENIGN LESIONS UP TO 14: ICD-10-PCS | Mod: S$PBB,ICN,, | Performed by: FAMILY MEDICINE

## 2023-04-10 PROCEDURE — 76857 US EXAM PELVIC LIMITED: CPT | Mod: 26,,, | Performed by: RADIOLOGY

## 2023-04-10 PROCEDURE — 99214 OFFICE O/P EST MOD 30 MIN: CPT | Mod: PBBFAC,PO | Performed by: FAMILY MEDICINE

## 2023-04-10 PROCEDURE — 17110 DESTRUCTION B9 LES UP TO 14: CPT | Mod: S$PBB,ICN,, | Performed by: FAMILY MEDICINE

## 2023-04-10 PROCEDURE — 99214 OFFICE O/P EST MOD 30 MIN: CPT | Mod: 25,S$PBB,ICN, | Performed by: FAMILY MEDICINE

## 2023-04-10 PROCEDURE — 99999 PR PBB SHADOW E&M-EST. PATIENT-LVL IV: CPT | Mod: PBBFAC,,, | Performed by: FAMILY MEDICINE

## 2023-04-10 RX ORDER — TAMSULOSIN HYDROCHLORIDE 0.4 MG/1
0.4 CAPSULE ORAL DAILY
Qty: 30 CAPSULE | Refills: 11 | Status: SHIPPED | OUTPATIENT
Start: 2023-04-10 | End: 2024-04-09

## 2023-04-10 NOTE — PROGRESS NOTES
Chief Complaint:    Chief Complaint   Patient presents with    Follow-up       History of Present Illness:    Patient presents today,  He says he has been having postvoid dribbling after being.  Some drops of urine come out.  He has no trouble emptying his bladder and his stream is also okay.  No burning sensation.      His blood pressure meds have been changed that is helping with his blood pressure and his leg swelling has also improved     His skin lesion on the top of the left temporal scalp      ROS:  Review of Systems   Constitutional:  Negative for activity change, chills, fatigue, fever and unexpected weight change.   HENT:  Negative for congestion, ear discharge, ear pain, hearing loss, postnasal drip and rhinorrhea.    Eyes:  Negative for pain and visual disturbance.   Respiratory:  Negative for cough, chest tightness and shortness of breath.    Cardiovascular:  Negative for chest pain and palpitations.   Gastrointestinal:  Negative for abdominal pain, diarrhea and vomiting.   Endocrine: Negative for heat intolerance.   Genitourinary:  Negative for dysuria, flank pain, frequency and hematuria.   Musculoskeletal:  Negative for back pain, gait problem and neck pain.   Skin:  Negative for color change and rash.   Neurological:  Negative for dizziness, tremors, seizures, numbness and headaches.   Psychiatric/Behavioral:  Negative for agitation, hallucinations, self-injury, sleep disturbance and suicidal ideas. The patient is not nervous/anxious.      Past Medical History:   Diagnosis Date    Asthma     Hypertension     Sleep apnea        Social History:  Social History     Socioeconomic History    Marital status:    Tobacco Use    Smoking status: Never    Smokeless tobacco: Current     Types: Snuff    Tobacco comments:     1 can daily    Substance and Sexual Activity    Alcohol use: No    Drug use: No    Sexual activity: Yes       Family History:   family history includes Anuerysm in his mother;  "Diabetes in his mother; Heart disease in his father.    Health Maintenance   Topic Date Due    Lipid Panel  06/03/2027    TETANUS VACCINE  10/04/2031    Hepatitis C Screening  Completed       Exam:Physical     Vital Signs  Temp: 97.8 °F (36.6 °C)  Temp Source: Tympanic  Pulse: 88  SpO2: 97 %  BP: 128/78  BP Location: Left arm  Patient Position: Sitting  Pain Score:   4  Pain Loc: Back  Height and Weight  Height: 5' 11" (180.3 cm)  Weight: (!) 174.9 kg (385 lb 9.4 oz)  BSA (Calculated - sq m): 2.96 sq meters  BMI (Calculated): 53.8  Weight in (lb) to have BMI = 25: 178.9]    Body mass index is 53.78 kg/m².    Physical Exam  Constitutional:       Appearance: He is well-developed.   HENT:      Head:        Comments: 0.5 cm warty growth as shown.  Eyes:      Conjunctiva/sclera: Conjunctivae normal.      Pupils: Pupils are equal, round, and reactive to light.   Cardiovascular:      Rate and Rhythm: Normal rate and regular rhythm.      Heart sounds: Normal heart sounds. No murmur heard.  Pulmonary:      Effort: Pulmonary effort is normal. No respiratory distress.      Breath sounds: Normal breath sounds. No wheezing or rales.   Chest:      Chest wall: No tenderness.   Abdominal:      General: There is no distension.      Palpations: Abdomen is soft. There is no mass.      Tenderness: There is no abdominal tenderness. There is no guarding.   Genitourinary:     Prostate: Normal.   Musculoskeletal:         General: No tenderness.      Cervical back: Normal range of motion and neck supple.   Lymphadenopathy:      Cervical: No cervical adenopathy.   Skin:     General: Skin is warm and dry.   Neurological:      Mental Status: He is alert and oriented to person, place, and time.      Deep Tendon Reflexes: Reflexes are normal and symmetric.   Psychiatric:         Behavior: Behavior normal.         Thought Content: Thought content normal.         Judgment: Judgment normal.         Assessment:      ICD-10-CM ICD-9-CM   1. Post-void " dribbling  N39.43 788.35   2. Primary hypertension  I10 401.9   3. Viral warts, unspecified type  B07.9 078.10         Plan:    Check PSA urinalysis pre and postvoid ultrasound start on Flomax   Hypertension good control  Cryotherapy done to the wart on the left temple if not improved please come back        No follow-ups on file.      Alisia Guardado MD

## 2023-04-11 ENCOUNTER — TELEPHONE (OUTPATIENT)
Dept: FAMILY MEDICINE | Facility: CLINIC | Age: 58
End: 2023-04-11
Payer: MEDICARE

## 2023-04-11 ENCOUNTER — LAB VISIT (OUTPATIENT)
Dept: LAB | Facility: HOSPITAL | Age: 58
End: 2023-04-11
Attending: FAMILY MEDICINE
Payer: MEDICARE

## 2023-04-11 DIAGNOSIS — N39.43 POST-VOID DRIBBLING: Primary | ICD-10-CM

## 2023-04-11 DIAGNOSIS — N39.43 POST-VOID DRIBBLING: ICD-10-CM

## 2023-04-11 LAB
BILIRUB UR QL STRIP: NEGATIVE
CLARITY UR REFRACT.AUTO: ABNORMAL
COLOR UR AUTO: ABNORMAL
GLUCOSE UR QL STRIP: NEGATIVE
HGB UR QL STRIP: ABNORMAL
KETONES UR QL STRIP: NEGATIVE
LEUKOCYTE ESTERASE UR QL STRIP: NEGATIVE
MICROSCOPIC COMMENT: ABNORMAL
NITRITE UR QL STRIP: NEGATIVE
PH UR STRIP: 5 [PH] (ref 5–8)
PROT UR QL STRIP: NEGATIVE
RBC #/AREA URNS AUTO: 13 /HPF (ref 0–4)
SP GR UR STRIP: 1.02 (ref 1–1.03)
URN SPEC COLLECT METH UR: ABNORMAL
WBC #/AREA URNS AUTO: 0 /HPF (ref 0–5)

## 2023-04-11 PROCEDURE — 81001 URINALYSIS AUTO W/SCOPE: CPT | Performed by: FAMILY MEDICINE

## 2023-04-12 ENCOUNTER — PATIENT MESSAGE (OUTPATIENT)
Dept: FAMILY MEDICINE | Facility: CLINIC | Age: 58
End: 2023-04-12
Payer: MEDICARE

## 2023-04-12 DIAGNOSIS — N39.43 POST-VOID DRIBBLING: Primary | ICD-10-CM

## 2023-04-24 ENCOUNTER — OFFICE VISIT (OUTPATIENT)
Dept: CARDIOLOGY | Facility: CLINIC | Age: 58
End: 2023-04-24
Payer: MEDICARE

## 2023-04-24 ENCOUNTER — LAB VISIT (OUTPATIENT)
Dept: LAB | Facility: HOSPITAL | Age: 58
End: 2023-04-24
Attending: STUDENT IN AN ORGANIZED HEALTH CARE EDUCATION/TRAINING PROGRAM
Payer: MEDICARE

## 2023-04-24 VITALS
DIASTOLIC BLOOD PRESSURE: 80 MMHG | OXYGEN SATURATION: 95 % | SYSTOLIC BLOOD PRESSURE: 125 MMHG | WEIGHT: 315 LBS | HEART RATE: 81 BPM | BODY MASS INDEX: 52.7 KG/M2

## 2023-04-24 DIAGNOSIS — Z72.0 CHEWS TOBACCO REGULARLY: ICD-10-CM

## 2023-04-24 DIAGNOSIS — M79.89 LEG SWELLING: ICD-10-CM

## 2023-04-24 DIAGNOSIS — I10 HYPERTENSION, UNSPECIFIED TYPE: ICD-10-CM

## 2023-04-24 DIAGNOSIS — R06.09 DOE (DYSPNEA ON EXERTION): ICD-10-CM

## 2023-04-24 DIAGNOSIS — E66.01 MORBID OBESITY WITH BMI OF 50.0-59.9, ADULT: ICD-10-CM

## 2023-04-24 DIAGNOSIS — I87.2 VENOUS STASIS DERMATITIS, UNSPECIFIED LATERALITY: ICD-10-CM

## 2023-04-24 DIAGNOSIS — M79.89 LEG SWELLING: Primary | ICD-10-CM

## 2023-04-24 LAB
ALBUMIN SERPL BCP-MCNC: 3.9 G/DL (ref 3.5–5.2)
ALP SERPL-CCNC: 86 U/L (ref 55–135)
ALT SERPL W/O P-5'-P-CCNC: 27 U/L (ref 10–44)
ANION GAP SERPL CALC-SCNC: 8 MMOL/L (ref 8–16)
AST SERPL-CCNC: 16 U/L (ref 10–40)
BILIRUB SERPL-MCNC: 0.5 MG/DL (ref 0.1–1)
BNP SERPL-MCNC: 13 PG/ML (ref 0–99)
BUN SERPL-MCNC: 26 MG/DL (ref 6–20)
CALCIUM SERPL-MCNC: 9.2 MG/DL (ref 8.7–10.5)
CHLORIDE SERPL-SCNC: 104 MMOL/L (ref 95–110)
CO2 SERPL-SCNC: 26 MMOL/L (ref 23–29)
CREAT SERPL-MCNC: 1.2 MG/DL (ref 0.5–1.4)
EST. GFR  (NO RACE VARIABLE): >60 ML/MIN/1.73 M^2
GLUCOSE SERPL-MCNC: 90 MG/DL (ref 70–110)
POTASSIUM SERPL-SCNC: 4.6 MMOL/L (ref 3.5–5.1)
PROT SERPL-MCNC: 7.2 G/DL (ref 6–8.4)
SODIUM SERPL-SCNC: 138 MMOL/L (ref 136–145)

## 2023-04-24 PROCEDURE — 99213 OFFICE O/P EST LOW 20 MIN: CPT | Mod: PBBFAC | Performed by: STUDENT IN AN ORGANIZED HEALTH CARE EDUCATION/TRAINING PROGRAM

## 2023-04-24 PROCEDURE — 83880 ASSAY OF NATRIURETIC PEPTIDE: CPT | Performed by: STUDENT IN AN ORGANIZED HEALTH CARE EDUCATION/TRAINING PROGRAM

## 2023-04-24 PROCEDURE — 99999 PR PBB SHADOW E&M-EST. PATIENT-LVL III: CPT | Mod: PBBFAC,,, | Performed by: STUDENT IN AN ORGANIZED HEALTH CARE EDUCATION/TRAINING PROGRAM

## 2023-04-24 PROCEDURE — 99214 OFFICE O/P EST MOD 30 MIN: CPT | Mod: S$PBB,,, | Performed by: STUDENT IN AN ORGANIZED HEALTH CARE EDUCATION/TRAINING PROGRAM

## 2023-04-24 PROCEDURE — 36415 COLL VENOUS BLD VENIPUNCTURE: CPT | Performed by: STUDENT IN AN ORGANIZED HEALTH CARE EDUCATION/TRAINING PROGRAM

## 2023-04-24 PROCEDURE — 80053 COMPREHEN METABOLIC PANEL: CPT | Performed by: STUDENT IN AN ORGANIZED HEALTH CARE EDUCATION/TRAINING PROGRAM

## 2023-04-24 PROCEDURE — 99999 PR PBB SHADOW E&M-EST. PATIENT-LVL III: ICD-10-PCS | Mod: PBBFAC,,, | Performed by: STUDENT IN AN ORGANIZED HEALTH CARE EDUCATION/TRAINING PROGRAM

## 2023-04-24 PROCEDURE — 99214 PR OFFICE/OUTPT VISIT, EST, LEVL IV, 30-39 MIN: ICD-10-PCS | Mod: S$PBB,,, | Performed by: STUDENT IN AN ORGANIZED HEALTH CARE EDUCATION/TRAINING PROGRAM

## 2023-04-24 RX ORDER — FUROSEMIDE 40 MG/1
40 TABLET ORAL DAILY PRN
Qty: 30 TABLET | Refills: 11 | Status: SHIPPED | OUTPATIENT
Start: 2023-04-24 | End: 2024-04-23

## 2023-04-24 NOTE — PROGRESS NOTES
Section of Cardiology                  Cardiac Clinic Note    Chief Complaint/Reason for consultation: establish care       HPI:   Robert Munoz is a 58 y.o. male with h/o MALIA, tobacco ab use, HTN, obesity, chronic back pain who was rferred to cardiology clinic by PCP Dr. Guardado for evaluation.     2/20/23  Comes in to establish care  Knows he needs to lose weight  Does not exercise much   Gets a little SOB, but doesn't walk much due to back pain  If walks a long distance, gets SOB  Was placed on inhalers for possible asthma   Reports LE swelling in his feet, takes nifedipine   Drinks tea, trying to get reduce f sugar intake   Trying to drink more water   Since 10/22 has gained 17 lbs  Has significant swelling in legs, thought due to burning from detergent recently     Denies chest pain, syncope, palpitations, PND, orthopnea     Family history: dad- ETOH, heart attacks     Reports dipping tobacco, denies ETOH abuse (stopped long time ago)  Denies CVA, diabetes       4/24/23  Leg swelling significantly improved  Still having feet swelling   Sleeps on one pillow   Has back pain, got 2 shots in his back, has helped his pain   SOB, improved  Watching his salt intake   Trying to quit dipping tobacco   No chest, syncope, dizziness, palpitations       EKG 2/20/23 ST, no acute ST - T wave changes    ECHO  2/23  The left ventricle is normal in size with concentric remodeling and normal systolic function.  Normal left ventricular diastolic function.  The estimated PA systolic pressure is 24 mmHg.  Normal right ventricular size with normal right ventricular systolic function.  Intermediate central venous pressure (8 mmHg).  The estimated ejection fraction is 55%.    STRESS TEST    [facility]      ROS: All 10 systems reviewed. Please refer to the HPI for pertinent positives. All other systems negative.     Past Medical History  Past Medical History:   Diagnosis Date    Asthma     Hypertension     Sleep apnea         Surgical History  Past Surgical History:   Procedure Laterality Date    COLONOSCOPY N/A 10/13/2021    Procedure: COLONOSCOPY;  Surgeon: Eula Dominguez MD;  Location: [facility] ENDO;  Service: Endoscopy;  Laterality: N/A;    ESOPHAGOGASTRODUODENOSCOPY N/A 10/27/2022    Procedure: EGD (ESOPHAGOGASTRODUODENOSCOPY);  Surgeon: uJliette Sawant MD;  Location: [facility] ENDO;  Service: Endoscopy;  Laterality: N/A;    SELECTIVE INJECTION OF ANESTHETIC AGENT AROUND LUMBAR SPINAL NERVE ROOT BY TRANSFORAMINAL APPROACH Bilateral 3/16/2023    Procedure: Bilateral L4/5 TF QING;  Surgeon: Dony Bean MD;  Location: [facility];  Service: Pain Management;  Laterality: Bilateral;          Allergies:   Review of patient's allergies indicates:  No Known Allergies    Social History:  Social History     Socioeconomic History    Marital status:    Tobacco Use    Smoking status: Never    Smokeless tobacco: Current     Types: Snuff    Tobacco comments:     1 can daily    Substance and Sexual Activity    Alcohol use: No    Drug use: No    Sexual activity: Yes       Family History:  family history includes Anuerysm in his mother; Diabetes in his mother; Heart disease in his father.    Home Medications:  Current Outpatient Medications on File Prior to Visit   Medication Sig Dispense Refill    albuterol (PROVENTIL/VENTOLIN HFA) 90 mcg/actuation inhaler Inhale 2 puffs into the lungs every 4 (four) hours as needed for Wheezing. 18 g 2    ferrous gluconate (FERGON) 324 MG tablet Take 1 tablet (324 mg total) by mouth 2 (two) times daily with meals. 180 tablet 5    fluticasone propionate (FLONASE) 50 mcg/actuation nasal spray 1 spray (50 mcg total) by Each Nostril route once daily. 16 g 3    levocetirizine (XYZAL) 5 MG tablet Take 1 tablet (5 mg total) by mouth every evening. 30 tablet 11    nebivoloL (BYSTOLIC) 20 mg Tab Take 20 mg by mouth once daily. 30 tablet 6    SUBOXONE 12-3 mg Film Place 0.5 Film under the tongue 3 (three)  times daily.      tamsulosin (FLOMAX) 0.4 mg Cap Take 1 capsule (0.4 mg total) by mouth once daily. 30 capsule 11    valsartan-hydrochlorothiazide (DIOVAN-HCT) 320-25 mg per tablet Take 1 tablet by mouth once daily. 90 tablet 3     Current Facility-Administered Medications on File Prior to Visit   Medication Dose Route Frequency Provider Last Rate Last Admin    ondansetron injection 4 mg  4 mg Intravenous Once PRN Dnoy Bean MD           Physical exam:  There were no vitals taken for this visit.        General: Pt is a 58 y.o. year old male who is AAOx3, in NAD, is pleasant, well nourished, looks stated age  HEENT: PERRL, EOMI, Oral mucosa pink & moist  CVS  No abnormal cardiac pulsations noted on inspection. JVP not raised. The apical impulse is normal on palpation, and is located in the left 5th intercostal space in the mid - clavicular line. No palpable thrills or abnormal pulsations noted. RR, S1 - S2 heard, no murmurs, rubs or gallops appreciated.   PUL : CTA B/L.   ABD : BS +, soft. No tenderness elicited   LE : Venous dermastasis. 1+ edema  Distal Pulses palpable B/L         LABS:    Chemistry:   Lab Results   Component Value Date     (L) 11/03/2022    K 3.9 11/03/2022     11/03/2022    CO2 24 11/03/2022    BUN 22 (H) 11/03/2022    CREATININE 1.0 11/03/2022    CALCIUM 9.7 11/03/2022     Cardiac Markers: No results found for: CKTOTAL, CKMB, CKMBINDEX, TROPONINI  Cardiac Markers (Last 3): No results found for: CKTOTAL, CKMB, CKMBINDEX, TROPONINI  CBC:   Lab Results   Component Value Date    WBC 13.60 (H) 11/03/2022    HGB 12.8 (L) 11/03/2022    HCT 41.0 11/03/2022    MCV 92 11/03/2022     11/03/2022     Lipids:   Lab Results   Component Value Date    CHOL 119 (L) 06/03/2022    TRIG 93 06/03/2022    HDL 29 (L) 06/03/2022     Coagulation: No results found for: PT, INR, APTT        Assessment    1. VIDAL (dyspnea on exertion)    2. Hypertension, unspecified type    3. Morbid obesity with  BMI of 50.0-59.9, adult    4. Chews tobacco regularly    5. Leg swelling        Plan:    VIDAL/LE swelling   Weight gain over the last few months, significant LE swelling  Stopped nifedipine (started 10/22)  Continue bystolic 20 mg daily  continue Diovan-hctz  Continue compression stockings  Obtain BNP and CMP  Start Lasix for 3 days, then p.r.n. for swelling    Tobacco abuse  Dipping cessation encouraged    Chronic back pain  Receiving prn spinal injections     HTN  Stable  Continue diovan-hctz, bystolic    Obesity, BMI 50-54.9  Low salt, low fat diet  Exercise as tolerated, at least 30 min daily        This note was prepared using voice recognition system and is likely to have sound alike errors that may have been overlooked even after proofreading.     I have reviewed all pertinent chart information.  Plans and recommendations have been formulated under my direct supervision. All questions answered and patient voiced understanding.   If symptoms persist go to the ED.    RTC in 2 months         Matty Hassan MD  Cardiology

## 2023-04-25 ENCOUNTER — PATIENT MESSAGE (OUTPATIENT)
Dept: FAMILY MEDICINE | Facility: CLINIC | Age: 58
End: 2023-04-25
Payer: MEDICARE

## 2023-04-27 ENCOUNTER — TELEPHONE (OUTPATIENT)
Dept: PAIN MEDICINE | Facility: CLINIC | Age: 58
End: 2023-04-27
Payer: MEDICARE

## 2023-04-27 NOTE — PROGRESS NOTES
Established Patient Chronic Pain Note (Follow up visit)    Chief Complaint:   Chief Complaint   Patient presents with    Low-back Pain   lumbosacral area and radiates to the bilateral hips occasionally      SUBJECTIVE:    Interval History (4/28/2023): Robert Munoz presents today for follow-up visit.  he underwent Bilateral L4/5 TF QING on 3/16/23.  The patient reports that he is/was better following the procedure.  he reports 100% pain relief.  The changes lasted 2 weeks, but pain started to return after that, now approaching back to baseline.  Also c/o muscle tightness.  Patient reports pain as 4/10 today but Increases with activity.    Interval History (2/21/2023):  Robert Munoz presents today for follow-up visit.  Patient was last seen on 1/4/2023 (6 weeks ago). At that visit, the plan was to schedule injection, but it wasn't completed. Patient reports pain as 3/10 today.  Pain significantly increases with any activity including bending over to do laundry or dishes.  Pain increases to 9/10.  That he was unable to complete the injection previously because of uncontrolled blood pressure and wheezing.  Since then, he saw primary care and Cardiology, and his wheezing and shortness of breath has improved with Medrol Dosepak.    Interval HPI (1/4/2023):  Robert Munoz is a 57 y.o. male who presents to the clinic for a follow-up appointment for lower back pain. Since the last visit, Robert Munoz states the pain has been persistant. Current pain intensity is 8/10.  He continues locates the pain to the lumbosacral region with radiation into the extremities bilaterally, mostly at night.  He has tried many different conservative treatments including medications, physical therapy and undressed without much resolution of his pain/symptoms.  Patient denies night fever/night sweats, urinary incontinence, bowel incontinence, significant weight loss, significant motor weakness, and loss of sensations.    Initial HPI  08/19/2021:  Robert Munoz is a 56 y.o. male who presents to the clinic for the evaluation of lower back pain.  He was referred by his primary care provider for further evaluation management of this pain.  He has a past medical history of hypertension, morbid obesity, and multiple other medical comorbidities as listed in his chart.  The pain started 20+ ago following an injury he sustained while working on a roof that caved in and collapsed and symptoms have been unchanged. The pain is located in the lumbosacral area and radiates to the bilateral hips occasionally.  The pain is described as Aching and is rated as 6/10. The pain is rated with a score of  5/10 on the BEST day and a score of 10/10 on the WORST day.  Symptoms interfere with daily activity. The pain is exacerbated by movement.  The pain is mitigated by medications and rest. The patient reports spending 2-4 hours per day reclining. The patient reports 5-7 hours of uninterrupted sleep per night.     Pain Disability Index Review:   No flowsheet data found.       Non-Pharmacologic Treatments:  Physical Therapy/Home Exercise: yes  Ice/Heat:yes  TENS: no  Acupuncture: no  Massage: no  Chiropractic: no    Other: no        Pain Medications:  - Opioids: Suboxone  - Adjuvant Medications: Denies  - Anti-Coagulants: None       Pain Procedures:   - Bilateral L4/5 TF QING on 3/16/23 with 100% pain relief x 2 weeks, but pain started to return after that, now approaching back to baseline 4 weeks post         Imaging (Reviewed on 4/28/2023):     MRI lumbar spine 11/27/2020:  There is mild dextroscoliosis of the lumbar spine.  There is a few mm of anterolisthesis of L5 on S1. The vertebral body heights are well maintained, with no fracture.  A few multilevel Schmorl's nodes are noted.  No marrow signal abnormality suspicious for an infiltrative process.     The conus medullaris terminates at approximately the L1-L2 disk space.  The adjacent soft tissue structures show no  significant abnormalities.  There is disc desiccation noted throughout the lumbar spine with mild-to-moderate multilevel disc space narrowing.     L1-L2: Mild diffuse disc bulge resulting in no significant central or neural foraminal canal narrowing.     L2-L3: Mild diffuse disc bulge resulting in no significant central canal narrowing.  There is mild narrowing of the inferior recess of the right neural foraminal canal.     L3-L4: Mild diffuse disc bulge resulting in no significant central or neural foraminal canal narrowing.     L4-L5:  Mild diffuse disc bulge and moderate bilateral facet arthropathy resulting in no significant central canal narrowing.  There is some narrowing of either lateral recess.  The left neural foraminal canal is severely narrowed.  The right neural foraminal canal is at least moderately narrowed.     L5-S1:  Grade 1 spondylolisthesis resulting in no significant central canal narrowing.  Prominent epidural fat surrounding the thecal sac at this level.  The left neural foraminal canal is minimally narrowed.  The right neural foraminal canal is at least moderately narrowed.     X-ray lumbar spine 11/17/2020:  Mild dextroscoliosis.  Vertebral body heights maintained without definite spondylolisthesis.  Multilevel disc height loss noted most prevalent L3-4.  Multilevel osteophyte findings present, largest at the right aspect of the L1-2 level.  Mid and lower lumbar spine facet arthropathy noted.  No acute osseous or soft tissue abnormality.             REVIEW OF SYSTEMS:    GENERAL:  No weight loss, malaise or fevers.  HEENT:   No recent changes in vision or hearing  NECK:  Negative for lumps, no difficulty with swallowing.  RESPIRATORY:  Negative for cough, wheezing or shortness of breath, patient denies any recent URI.  CARDIOVASCULAR:  Negative for chest pain, leg swelling or palpitations.  GI:  Negative for abdominal discomfort, blood in stools or black stools or change in bowel  "habits.  MUSCULOSKELETAL:  See HPI.  SKIN:  Negative for lesions, rash, and itching.  PSYCH:  No mood disorder or recent psychosocial stressors.  Patients sleep is not disturbed secondary to pain.  HEMATOLOGY/LYMPHOLOGY:  Negative for prolonged bleeding, bruising easily or swollen nodes.  Patient is not currently taking any anti-coagulants  NEURO:   No history of headaches, syncope, paralysis, seizures or tremors.  All other reviewed and negative other than HPI.      OBJECTIVE:    Physical Exam:   Vitals:    04/28/23 1309   BP: (!) 164/72   Pulse: 102   Weight: (!) 172.6 kg (380 lb 6.5 oz)   Height: 5' 11" (1.803 m)   PainSc:   4   PainLoc: Back   Body mass index is 53.06 kg/m².   (reviewed on 4/28/2023)     GENERAL: Well appearing, in no acute distress, alert and oriented x3.  Morbidly obese  PSYCH:  Mood and affect appropriate.  SKIN: Skin color, texture, turgor normal, no rashes or lesions.  HEAD/FACE:  Normocephalic, atraumatic.    PULM: No evidence of respiratory difficulty, symmetric chest rise.  GI:  Soft and non-tender.  BACK: SLR is equivocal to radicular pain.  Moderate TTP over the facet joints of the lumbar spine and lumbar paraspinals.  Limited ROM secondary to body habitus and pain reproduction.  EXTREMITIES:No deformities, edema, or skin discoloration.    MUSCULOSKELETAL:  Hip and knee provocative maneuvers are unremarkable.  There is minimal pain with palpation over the sacroiliac joints bilaterally.  BUE & BLE strength is normal and symmetric.  No atrophy or tone abnormalities are noted.  NEURO: BUE & BLE coordination and muscle stretch reflexes are physiologic and symmetric.  Plantar response are downgoing. No clonus.  No loss of sensation is noted.  GAIT:  Slow, antalgic.         ASSESSMENT: 58 y.o. year old male with chronic lower back and leg pain, consistent with     1. Lumbar radiculopathy  Case Request-RAD/Other Procedure Area: Bilateral L4/5 TF QING      2. Lumbar muscle pain  methocarbamoL " (ROBAXIN) 500 MG Tab            PLAN:   1. Interventional:   - Schedule repeat Bilateral L4/5 TF QING. Patient is not taking prescription blood thinners or ASA.    - S/p Bilateral L4/5 TF QING on 3/16/23 with 100% pain relief x 2 weeks, but pain started to return after that, now approaching back to baseline 4 weeks post.    2. Pharmacologic:   - Start Robaxin (methocarbamol) 500mg BID PRN muscle spasms (or can take 1/2 tablet).  he understands this could cause drowsiness.    - Consider trying Lyrica. Previously stopped gabapentin (Neurontin) 300mg TID - no longer taking due to SE of itching, brain fog.  - Continue with Suboxone from outside provider.   - Anticoagulation use: None.  - LA  reviewed and appropriate.       3. Rehabilitative: Encouraged regular exercise. Continue exercises and activities as tolerated.     4. Diagnostic: None for now.    5. Follow up: 4 weeks post-injection - will send online ticket scheduling on [email or web address] - in clinic (per pt request)     - This condition does not require this patient to take time off of work, and the primary goal of our Pain Management services is to improve the patient's functional capacity.   - I discussed the risks, benefits, and alternatives to potential treatment options. All questions and concerns were fully addressed today in clinic.         Sherly Esteves PA-C  Interventional Pain Management - [facility]    Disclaimer:  This note was prepared using voice recognition system and is likely to have sound alike errors that may have been overlooked even after proof reading.  Please call me with any questions.

## 2023-04-28 ENCOUNTER — OFFICE VISIT (OUTPATIENT)
Dept: PAIN MEDICINE | Facility: CLINIC | Age: 58
End: 2023-04-28
Payer: MEDICARE

## 2023-04-28 VITALS
SYSTOLIC BLOOD PRESSURE: 164 MMHG | HEART RATE: 102 BPM | WEIGHT: 315 LBS | DIASTOLIC BLOOD PRESSURE: 72 MMHG | BODY MASS INDEX: 44.1 KG/M2 | HEIGHT: 71 IN

## 2023-04-28 DIAGNOSIS — M79.18 LUMBAR MUSCLE PAIN: ICD-10-CM

## 2023-04-28 DIAGNOSIS — M54.16 LUMBAR RADICULOPATHY: Primary | ICD-10-CM

## 2023-04-28 PROCEDURE — 99214 PR OFFICE/OUTPT VISIT, EST, LEVL IV, 30-39 MIN: ICD-10-PCS | Mod: S$PBB,,, | Performed by: PHYSICIAN ASSISTANT

## 2023-04-28 PROCEDURE — 99213 OFFICE O/P EST LOW 20 MIN: CPT | Mod: PBBFAC | Performed by: PHYSICIAN ASSISTANT

## 2023-04-28 PROCEDURE — 99214 OFFICE O/P EST MOD 30 MIN: CPT | Mod: S$PBB,,, | Performed by: PHYSICIAN ASSISTANT

## 2023-04-28 PROCEDURE — 99999 PR PBB SHADOW E&M-EST. PATIENT-LVL III: CPT | Mod: PBBFAC,,, | Performed by: PHYSICIAN ASSISTANT

## 2023-04-28 PROCEDURE — 99999 PR PBB SHADOW E&M-EST. PATIENT-LVL III: ICD-10-PCS | Mod: PBBFAC,,, | Performed by: PHYSICIAN ASSISTANT

## 2023-04-28 RX ORDER — METHOCARBAMOL 500 MG/1
500 TABLET, FILM COATED ORAL 2 TIMES DAILY PRN
Qty: 60 TABLET | Refills: 0 | Status: SHIPPED | OUTPATIENT
Start: 2023-04-28

## 2023-05-01 ENCOUNTER — PATIENT MESSAGE (OUTPATIENT)
Dept: PAIN MEDICINE | Facility: HOSPITAL | Age: 58
End: 2023-05-01
Payer: MEDICARE

## 2023-05-01 NOTE — PRE-PROCEDURE INSTRUCTIONS
Spoke with patient regarding procedure scheduled on 5.2     Arrival time 1045     Has patient been sick with fever or on antibiotics within the last 7 days? No     Does the patient have any open wounds, sores or rashes? No     Does the patient have any recent fractures? no     Has patient received a vaccination within the last 7 days? No     Received the COVID vaccination? yes     Has the patient stopped all medications as directed? na     Does patient have a pacemaker and or defibrillator? no     Does the patient have a ride to and from procedure and someone reliable to remain with patient?      Is the patient diabetic? no     Does the patient have sleep apnea? Or use O2 at home? mirian     Is the patient receiving sedation? Yes     Is the patient instructed to remain NPO beginning at midnight the night before their procedure? yes     Procedure location confirmed with patient? Yes     Covid- Denies signs/symptoms. Instructed to notify PAT/MD if any changes.

## 2023-05-02 ENCOUNTER — HOSPITAL ENCOUNTER (OUTPATIENT)
Facility: HOSPITAL | Age: 58
Discharge: HOME | End: 2023-05-02
Attending: PHYSICAL MEDICINE & REHABILITATION | Admitting: PHYSICAL MEDICINE & REHABILITATION
Payer: MEDICARE

## 2023-05-02 VITALS
OXYGEN SATURATION: 100 % | BODY MASS INDEX: 44.1 KG/M2 | TEMPERATURE: 98 F | HEART RATE: 81 BPM | HEIGHT: 71 IN | DIASTOLIC BLOOD PRESSURE: 56 MMHG | SYSTOLIC BLOOD PRESSURE: 123 MMHG | WEIGHT: 315 LBS | RESPIRATION RATE: 15 BRPM

## 2023-05-02 DIAGNOSIS — M54.16 LUMBAR RADICULOPATHY: Primary | ICD-10-CM

## 2023-05-02 PROCEDURE — 64483 PR EPIDURAL INJ, ANES/STEROID, TRANSFORAMINAL, LUMB/SACR, SNGL LEVL: ICD-10-PCS | Mod: 50,,, | Performed by: PHYSICAL MEDICINE & REHABILITATION

## 2023-05-02 PROCEDURE — 25500020 PHARM REV CODE 255: Performed by: PHYSICAL MEDICINE & REHABILITATION

## 2023-05-02 PROCEDURE — 64483 NJX AA&/STRD TFRM EPI L/S 1: CPT | Mod: 50 | Performed by: PHYSICAL MEDICINE & REHABILITATION

## 2023-05-02 PROCEDURE — 63600175 PHARM REV CODE 636 W HCPCS: Performed by: PHYSICAL MEDICINE & REHABILITATION

## 2023-05-02 PROCEDURE — 64483 NJX AA&/STRD TFRM EPI L/S 1: CPT | Mod: 50,,, | Performed by: PHYSICAL MEDICINE & REHABILITATION

## 2023-05-02 PROCEDURE — 25000003 PHARM REV CODE 250: Performed by: PHYSICAL MEDICINE & REHABILITATION

## 2023-05-02 RX ORDER — BUPIVACAINE HYDROCHLORIDE 2.5 MG/ML
INJECTION, SOLUTION EPIDURAL; INFILTRATION; INTRACAUDAL
Status: DISCONTINUED | OUTPATIENT
Start: 2023-05-02 | End: 2023-05-02 | Stop reason: HOSPADM

## 2023-05-02 RX ORDER — ONDANSETRON 2 MG/ML
4 INJECTION INTRAMUSCULAR; INTRAVENOUS ONCE AS NEEDED
Status: DISCONTINUED | OUTPATIENT
Start: 2023-05-02 | End: 2023-05-02 | Stop reason: HOSPADM

## 2023-05-02 RX ORDER — MIDAZOLAM HYDROCHLORIDE 1 MG/ML
INJECTION, SOLUTION INTRAMUSCULAR; INTRAVENOUS
Status: DISCONTINUED | OUTPATIENT
Start: 2023-05-02 | End: 2023-05-02 | Stop reason: HOSPADM

## 2023-05-02 RX ORDER — FENTANYL CITRATE 50 UG/ML
INJECTION, SOLUTION INTRAMUSCULAR; INTRAVENOUS
Status: DISCONTINUED | OUTPATIENT
Start: 2023-05-02 | End: 2023-05-02 | Stop reason: HOSPADM

## 2023-05-02 RX ORDER — METHYLPREDNISOLONE ACETATE 40 MG/ML
INJECTION, SUSPENSION INTRA-ARTICULAR; INTRALESIONAL; INTRAMUSCULAR; SOFT TISSUE
Status: DISCONTINUED | OUTPATIENT
Start: 2023-05-02 | End: 2023-05-02 | Stop reason: HOSPADM

## 2023-05-02 NOTE — DISCHARGE INSTRUCTIONS

## 2023-05-02 NOTE — H&P
HPI  Patient presenting for Procedure(s) (LRB):  Bilateral L4/5 TF QING (Bilateral)     Patient on Anti-coagulation No    No health changes since previous encounter    Past Medical History:   Diagnosis Date    Asthma     Hypertension     Sleep apnea      Past Surgical History:   Procedure Laterality Date    COLONOSCOPY N/A 10/13/2021    Procedure: COLONOSCOPY;  Surgeon: Eula Dominguez MD;  Location: [facility] ENDO;  Service: Endoscopy;  Laterality: N/A;    ESOPHAGOGASTRODUODENOSCOPY N/A 10/27/2022    Procedure: EGD (ESOPHAGOGASTRODUODENOSCOPY);  Surgeon: Juliette Sawant MD;  Location: [facility] ENDO;  Service: Endoscopy;  Laterality: N/A;    SELECTIVE INJECTION OF ANESTHETIC AGENT AROUND LUMBAR SPINAL NERVE ROOT BY TRANSFORAMINAL APPROACH Bilateral 3/16/2023    Procedure: Bilateral L4/5 TF QING;  Surgeon: Dony Bean MD;  Location: [facility];  Service: Pain Management;  Laterality: Bilateral;     Review of patient's allergies indicates:  No Known Allergies     Current Facility-Administered Medications on File Prior to Encounter   Medication Dose Route Frequency Provider Last Rate Last Admin    ondansetron injection 4 mg  4 mg Intravenous Once PRN Dony Bean MD         Current Outpatient Medications on File Prior to Encounter   Medication Sig Dispense Refill    nebivoloL (BYSTOLIC) 20 mg Tab Take 20 mg by mouth once daily. 30 tablet 6    SUBOXONE 12-3 mg Film Place 0.5 Film under the tongue 3 (three) times daily.      valsartan-hydrochlorothiazide (DIOVAN-HCT) 320-25 mg per tablet Take 1 tablet by mouth once daily. 90 tablet 3    albuterol (PROVENTIL/VENTOLIN HFA) 90 mcg/actuation inhaler Inhale 2 puffs into the lungs every 4 (four) hours as needed for Wheezing. 18 g 2    ferrous gluconate (FERGON) 324 MG tablet Take 1 tablet (324 mg total) by mouth 2 (two) times daily with meals. 180 tablet 5    fluticasone propionate (FLONASE) 50 mcg/actuation nasal spray 1 spray (50 mcg total) by Each Nostril route once  "daily. 16 g 3    furosemide (LASIX) 40 MG tablet Take 1 tablet (40 mg total) by mouth daily as needed (for swelling). Take for 3 days, then as needed for swelling 30 tablet 11    levocetirizine (XYZAL) 5 MG tablet Take 1 tablet (5 mg total) by mouth every evening. 30 tablet 11    methocarbamoL (ROBAXIN) 500 MG Tab Take 1 tablet (500 mg total) by mouth 2 (two) times daily as needed (muscle spasms). May cause drowsiness. 60 tablet 0    tamsulosin (FLOMAX) 0.4 mg Cap Take 1 capsule (0.4 mg total) by mouth once daily. 30 capsule 11        PMHx, PSHx, Allergies, Medications reviewed in epic    ROS negative except pain complaints in HPI    OBJECTIVE:    BP (!) 160/71 (BP Location: Right arm, Patient Position: Sitting)   Pulse 83   Temp 98 °F (36.7 °C) (Temporal)   Resp 20   Ht 5' 11" (1.803 m)   Wt (!) 171.6 kg (378 lb 6.7 oz)   SpO2 98%   BMI 52.78 kg/m²     PHYSICAL EXAMINATION:    GENERAL: Well appearing, in no acute distress, alert and oriented x3.  PSYCH:  Mood and affect appropriate.  SKIN: Skin color, texture, turgor normal, no rashes or lesions which will impact the procedure.  CV: RRR with palpation of the radial artery.  PULM: No evidence of respiratory difficulty, symmetric chest rise. Clear to auscultation.  NEURO: Cranial nerves grossly intact.    Plan:    Proceed with procedure as planned Procedure(s) (LRB):  Bilateral L4/5 TF QING (Bilateral)    Dony Bean MD  05/02/2023            "

## 2023-05-02 NOTE — OP NOTE
INFORMED CONSENT: The procedure, risks, benefits and options were discussed with patient. There are no contraindications to the procedure. The patient expressed understanding and agreed to proceed. The personnel performing the procedure was discussed.    05/02/2023    Surgeon: Dony Bean MD    Assistants: None    Sedation: Conscious sedation provided by M.D    The patient was monitored with continuous pulse oximetry, EKG, and intermittent blood pressure monitors, immediately prior to administration of sedation.  The patient was hemodynamically stable throughout the entire process was responsive to voice, and breathing spontaneously.  Supplemental O2 was provided at 2L/min via nasal cannula.  Patient was comfortable for the duration of the procedure.     There was a total of 2mg IV Midazolam and 50mcg Fentanyl titrated for the procedure    Total sedation time was >10minutes and <20minutes      PROCEDURE:  Bilateral  L4/5  1) Left  L4/5 TRANSFORAMINAL EPIDURAL STEROID INJECTION  2) Right  L4/5 TRANSFORAMINAL EPIDURAL STEROID INJECTION      Pre Procedure diagnosis:  Bilateral L4/5 Lumbar radiculopathy [M54.16]    Post-Procedure diagnosis:   same    Complications: None    Specimens: None      DESCRIPTION OF PROCEDURE: The patient was brought to the procedure room. IV access was obtained prior to the procedure. The patient was positioned prone on the fluoroscopy table. Continuous hemodynamic monitoring was initiated including blood pressure, EKG, and pulse oximetry. . The skin was prepped with chlorhexidine and draped in a sterile fashion. Skin anesthesia was achieved using a total of 10mL of lidocaine, 5mL over each respective injection site.     The  L4/5 transforaminal spaces were identified with fluoroscopy in the  AP, oblique, and lateral views.  A 22 gauge spinal quinke needle was then advanced into the area of the trans foraminal spaces bilaterally with confirmation of proper needle position using AP,  oblique, and lateral fluoroscopic views. Once the needle tip was in the area of the transforaminal space, and there was no blood, CSF or paraesthesias,  1.5 mL of Omnipaque 300mg/ml was injected on each side for a total of 3mL.  Fluoroscopic imaging in the AP and lateral views revealed a clear outline of the spinal nerve with proximal spread of agent through the neural foramen into the epidural space. A total combination of 1 mL of Bupivicaine 0.25% and 40 mg depo medrol was injected on each side for a total of 4mL of injected medications with displacement of the contrast dye confirming that the medication went into the area of the transforaminal spaces bilaterally. A sterile dressing was applied.   Patient tolerated the procedure well.    Patient was taken back to the recovery room for further observation.     The patient was discharged to home in stable condition

## 2023-05-25 ENCOUNTER — OFFICE VISIT (OUTPATIENT)
Dept: FAMILY MEDICINE | Facility: CLINIC | Age: 58
End: 2023-05-25
Payer: MEDICARE

## 2023-05-25 VITALS
HEART RATE: 80 BPM | DIASTOLIC BLOOD PRESSURE: 76 MMHG | HEIGHT: 71 IN | OXYGEN SATURATION: 99 % | BODY MASS INDEX: 44.1 KG/M2 | SYSTOLIC BLOOD PRESSURE: 110 MMHG | WEIGHT: 315 LBS

## 2023-05-25 DIAGNOSIS — M79.3 LIPODERMATOSCLEROSIS OF BOTH LOWER EXTREMITIES: Primary | ICD-10-CM

## 2023-05-25 PROCEDURE — 99213 OFFICE O/P EST LOW 20 MIN: CPT | Mod: S$PBB,,, | Performed by: FAMILY MEDICINE

## 2023-05-25 PROCEDURE — 99214 OFFICE O/P EST MOD 30 MIN: CPT | Mod: PBBFAC,PO | Performed by: FAMILY MEDICINE

## 2023-05-25 PROCEDURE — 99999 PR PBB SHADOW E&M-EST. PATIENT-LVL IV: ICD-10-PCS | Mod: PBBFAC,,, | Performed by: FAMILY MEDICINE

## 2023-05-25 PROCEDURE — 99213 PR OFFICE/OUTPT VISIT, EST, LEVL III, 20-29 MIN: ICD-10-PCS | Mod: S$PBB,,, | Performed by: FAMILY MEDICINE

## 2023-05-25 PROCEDURE — 99999 PR PBB SHADOW E&M-EST. PATIENT-LVL IV: CPT | Mod: PBBFAC,,, | Performed by: FAMILY MEDICINE

## 2023-05-25 RX ORDER — CLOBETASOL PROPIONATE 0.5 MG/G
OINTMENT TOPICAL 2 TIMES DAILY
Qty: 30 G | Refills: 1 | Status: SHIPPED | OUTPATIENT
Start: 2023-05-25

## 2023-05-25 NOTE — PROGRESS NOTES
"  Chief Complaint:    Chief Complaint   Patient presents with    Follow-up       History of Present Illness:    Patient presents today  Complaining of leg swelling and rash which is a long-standing problem.    ROS:  Review of Systems    Past Medical History:   Diagnosis Date    Asthma     Hypertension     Sleep apnea        Social History:  Social History     Socioeconomic History    Marital status:    Tobacco Use    Smoking status: Never    Smokeless tobacco: Current     Types: Snuff    Tobacco comments:     1 can daily    Substance and Sexual Activity    Alcohol use: No    Drug use: No    Sexual activity: Yes       Family History:   family history includes Anuerysm in his mother; Diabetes in his mother; Heart disease in his father.    Health Maintenance   Topic Date Due    Lipid Panel  06/03/2027    TETANUS VACCINE  10/04/2031    Hepatitis C Screening  Completed       Exam:Physical     Vital Signs  Pulse: 80  SpO2: 99 %  BP: 110/76  BP Location: Left arm  Patient Position: Sitting  Pain Score:   8  Pain Loc: Back  Height and Weight  Height: 5' 11" (180.3 cm)  Weight: (!) 175.1 kg (386 lb 2.2 oz)  BSA (Calculated - sq m): 2.96 sq meters  BMI (Calculated): 53.9  Weight in (lb) to have BMI = 25: 178.9]    Body mass index is 53.85 kg/m².    Physical Exam  Constitutional:       Appearance: He is well-developed.   Eyes:      Conjunctiva/sclera: Conjunctivae normal.      Pupils: Pupils are equal, round, and reactive to light.   Cardiovascular:      Rate and Rhythm: Normal rate and regular rhythm.      Heart sounds: Normal heart sounds. No murmur heard.  Pulmonary:      Effort: Pulmonary effort is normal. No respiratory distress.      Breath sounds: Normal breath sounds. No wheezing or rales.   Chest:      Chest wall: No tenderness.   Abdominal:      General: There is no distension.      Palpations: Abdomen is soft. There is no mass.      Tenderness: There is no abdominal tenderness. There is no guarding. "   Musculoskeletal:         General: No tenderness.      Cervical back: Normal range of motion and neck supple.   Lymphadenopathy:      Cervical: No cervical adenopathy.   Skin:     General: Skin is warm and dry.   Neurological:      Mental Status: He is alert and oriented to person, place, and time.      Deep Tendon Reflexes: Reflexes are normal and symmetric.   Psychiatric:         Behavior: Behavior normal.         Thought Content: Thought content normal.         Judgment: Judgment normal.           Assessment:      ICD-10-CM ICD-9-CM   1. Lipodermatosclerosis of both lower extremities  I83.11 729.39    I83.12          Plan:      Recommend using compression stocking  Recommend weight loss   Clobetasol lotion as needed but not on a very regular basis.  Explained to patient that his condition is irreversible      No follow-ups on file.      Alisia Guaraddo MD

## 2023-05-30 ENCOUNTER — PATIENT MESSAGE (OUTPATIENT)
Dept: FAMILY MEDICINE | Facility: CLINIC | Age: 58
End: 2023-05-30
Payer: MEDICARE

## 2023-05-30 DIAGNOSIS — M79.3 LIPODERMATOSCLEROSIS OF BOTH LOWER EXTREMITIES: Primary | ICD-10-CM

## 2023-05-31 ENCOUNTER — OFFICE VISIT (OUTPATIENT)
Dept: OPHTHALMOLOGY | Facility: CLINIC | Age: 58
End: 2023-05-31
Payer: MEDICARE

## 2023-05-31 DIAGNOSIS — Z01.00 EYE EXAM NORMAL: Primary | ICD-10-CM

## 2023-05-31 DIAGNOSIS — H52.7 REFRACTIVE ERRORS: ICD-10-CM

## 2023-05-31 PROCEDURE — 92015 DETERMINE REFRACTIVE STATE: CPT | Mod: ,,, | Performed by: OPTOMETRIST

## 2023-05-31 PROCEDURE — 99999 PR PBB SHADOW E&M-EST. PATIENT-LVL III: CPT | Mod: PBBFAC,,, | Performed by: OPTOMETRIST

## 2023-05-31 PROCEDURE — 92015 PR REFRACTION: ICD-10-PCS | Mod: ,,, | Performed by: OPTOMETRIST

## 2023-05-31 PROCEDURE — 99999 PR PBB SHADOW E&M-EST. PATIENT-LVL III: ICD-10-PCS | Mod: PBBFAC,,, | Performed by: OPTOMETRIST

## 2023-05-31 PROCEDURE — 92014 PR EYE EXAM, EST PATIENT,COMPREHESV: ICD-10-PCS | Mod: S$PBB,,, | Performed by: OPTOMETRIST

## 2023-05-31 PROCEDURE — 92014 COMPRE OPH EXAM EST PT 1/>: CPT | Mod: S$PBB,,, | Performed by: OPTOMETRIST

## 2023-05-31 PROCEDURE — 99213 OFFICE O/P EST LOW 20 MIN: CPT | Mod: PBBFAC | Performed by: OPTOMETRIST

## 2023-05-31 NOTE — PROGRESS NOTES
HPI    New Patient   Last eye exam 2 years ago.    Patient states trouble with near vision.  No ocular pain/discomfort.  Not using any otc drops.  Wear otc readers.   Last edited by Manju Esteves on 5/31/2023  3:47 PM.            Assessment /Plan     For exam results, see Encounter Report.    Eye exam normal  -     Ambulatory referral/consult to Optometry    Refractive errors      No pathology.    No HTN Retinopathy    Dispense Final Rx for glasses.  RTC 1 year  Discussed above and answered questions.

## 2023-06-01 ENCOUNTER — PATIENT MESSAGE (OUTPATIENT)
Dept: OPHTHALMOLOGY | Facility: CLINIC | Age: 58
End: 2023-06-01
Payer: MEDICARE

## 2023-06-05 ENCOUNTER — TELEPHONE (OUTPATIENT)
Dept: PAIN MEDICINE | Facility: CLINIC | Age: 58
End: 2023-06-05
Payer: MEDICARE

## 2023-06-06 ENCOUNTER — DOCUMENTATION ONLY (OUTPATIENT)
Dept: PAIN MEDICINE | Facility: CLINIC | Age: 58
End: 2023-06-06
Payer: MEDICARE

## 2023-06-06 NOTE — PROGRESS NOTES
Precharting for 6/6/2023 (appointment not completed) - by DAWNA    Established Patient Chronic Pain Note (Follow up visit)    Chief Complaint:   No chief complaint on file.  lumbosacral area and radiates to the bilateral hips occasionally      SUBJECTIVE:    Interval History (6/6/2023):  Patient presents today for follow-up visit.  he underwent Bilateral L4/5 TF QING on 5/2/23.  The patient reports that he is/was  *  following the procedure.  he reports % pain relief.  The changes lasted 4 weeks so far.  The changes have continued through this visit.  Patient reports pain as  /10  today.    Interval History (4/28/2023): Robert Muonz presents today for follow-up visit.  he underwent Bilateral L4/5 TF QING on 3/16/23.  The patient reports that he is/was better following the procedure.  he reports 100% pain relief.  The changes lasted 2 weeks, but pain started to return after that, now approaching back to baseline.  Also c/o muscle tightness.  Patient reports pain as 4/10 today but Increases with activity.    Interval History (2/21/2023):  Robert Munoz presents today for follow-up visit.  Patient was last seen on 1/4/2023 (6 weeks ago). At that visit, the plan was to schedule injection, but it wasn't completed. Patient reports pain as 3/10 today.  Pain significantly increases with any activity including bending over to do laundry or dishes.  Pain increases to 9/10.  That he was unable to complete the injection previously because of uncontrolled blood pressure and wheezing.  Since then, he saw primary care and Cardiology, and his wheezing and shortness of breath has improved with Medrol Dosepak.    Interval HPI (1/4/2023):  Robert Munoz is a 57 y.o. male who presents to the clinic for a follow-up appointment for lower back pain. Since the last visit, Robert Munoz states the pain has been persistant. Current pain intensity is 8/10.  He continues locates the pain to the lumbosacral region with radiation into the  extremities bilaterally, mostly at night.  He has tried many different conservative treatments including medications, physical therapy and undressed without much resolution of his pain/symptoms.  Patient denies night fever/night sweats, urinary incontinence, bowel incontinence, significant weight loss, significant motor weakness, and loss of sensations.    Initial HPI 08/19/2021:  Robert Munoz is a 56 y.o. male who presents to the clinic for the evaluation of lower back pain.  He was referred by his primary care provider for further evaluation management of this pain.  He has a past medical history of hypertension, morbid obesity, and multiple other medical comorbidities as listed in his chart.  The pain started 20+ ago following an injury he sustained while working on a roof that caved in and collapsed and symptoms have been unchanged. The pain is located in the lumbosacral area and radiates to the bilateral hips occasionally.  The pain is described as Aching and is rated as 6/10. The pain is rated with a score of  5/10 on the BEST day and a score of 10/10 on the WORST day.  Symptoms interfere with daily activity. The pain is exacerbated by movement.  The pain is mitigated by medications and rest. The patient reports spending 2-4 hours per day reclining. The patient reports 5-7 hours of uninterrupted sleep per night.     Pain Disability Index Review:   No flowsheet data found.       Non-Pharmacologic Treatments:  Physical Therapy/Home Exercise: yes  Ice/Heat:yes  TENS: no  Acupuncture: no  Massage: no  Chiropractic: no    Other: no        Pain Medications:  - Opioids: Suboxone  - Adjuvant Medications: Denies  - Anti-Coagulants: None       Pain Procedures:   - Bilateral L4/5 TF QING on 3/16/23 with 100% pain relief x 2 weeks, but pain started to return after that, now approaching back to baseline 4 weeks post         Imaging (Reviewed on 6/6/2023):     MRI lumbar spine 11/27/2020:  There is mild dextroscoliosis of  the lumbar spine.  There is a few mm of anterolisthesis of L5 on S1. The vertebral body heights are well maintained, with no fracture.  A few multilevel Schmorl's nodes are noted.  No marrow signal abnormality suspicious for an infiltrative process.     The conus medullaris terminates at approximately the L1-L2 disk space.  The adjacent soft tissue structures show no significant abnormalities.  There is disc desiccation noted throughout the lumbar spine with mild-to-moderate multilevel disc space narrowing.     L1-L2: Mild diffuse disc bulge resulting in no significant central or neural foraminal canal narrowing.     L2-L3: Mild diffuse disc bulge resulting in no significant central canal narrowing.  There is mild narrowing of the inferior recess of the right neural foraminal canal.     L3-L4: Mild diffuse disc bulge resulting in no significant central or neural foraminal canal narrowing.     L4-L5:  Mild diffuse disc bulge and moderate bilateral facet arthropathy resulting in no significant central canal narrowing.  There is some narrowing of either lateral recess.  The left neural foraminal canal is severely narrowed.  The right neural foraminal canal is at least moderately narrowed.     L5-S1:  Grade 1 spondylolisthesis resulting in no significant central canal narrowing.  Prominent epidural fat surrounding the thecal sac at this level.  The left neural foraminal canal is minimally narrowed.  The right neural foraminal canal is at least moderately narrowed.     X-ray lumbar spine 11/17/2020:  Mild dextroscoliosis.  Vertebral body heights maintained without definite spondylolisthesis.  Multilevel disc height loss noted most prevalent L3-4.  Multilevel osteophyte findings present, largest at the right aspect of the L1-2 level.  Mid and lower lumbar spine facet arthropathy noted.  No acute osseous or soft tissue abnormality.             REVIEW OF SYSTEMS:    GENERAL:  No weight loss, malaise or fevers.  HEENT:   No  recent changes in vision or hearing  NECK:  Negative for lumps, no difficulty with swallowing.  RESPIRATORY:  Negative for cough, wheezing or shortness of breath, patient denies any recent URI.  CARDIOVASCULAR:  Negative for chest pain, leg swelling or palpitations.  GI:  Negative for abdominal discomfort, blood in stools or black stools or change in bowel habits.  MUSCULOSKELETAL:  See HPI.  SKIN:  Negative for lesions, rash, and itching.  PSYCH:  No mood disorder or recent psychosocial stressors.  Patients sleep is not disturbed secondary to pain.  HEMATOLOGY/LYMPHOLOGY:  Negative for prolonged bleeding, bruising easily or swollen nodes.  Patient is not currently taking any anti-coagulants  NEURO:   No history of headaches, syncope, paralysis, seizures or tremors.  All other reviewed and negative other than HPI.      OBJECTIVE:    Physical Exam:   There were no vitals filed for this visit.  There is no height or weight on file to calculate BMI.   (reviewed on 6/6/2023)     GENERAL: Well appearing, in no acute distress, alert and oriented x3.  Morbidly obese  PSYCH:  Mood and affect appropriate.  SKIN: Skin color, texture, turgor normal, no rashes or lesions.  HEAD/FACE:  Normocephalic, atraumatic.    PULM: No evidence of respiratory difficulty, symmetric chest rise.  GI:  Soft and non-tender.  BACK: SLR is equivocal to radicular pain.  Moderate TTP over the facet joints of the lumbar spine and lumbar paraspinals.  Limited ROM secondary to body habitus and pain reproduction.  EXTREMITIES:No deformities, edema, or skin discoloration.    MUSCULOSKELETAL:  Hip and knee provocative maneuvers are unremarkable.  There is minimal pain with palpation over the sacroiliac joints bilaterally.  BUE & BLE strength is normal and symmetric.  No atrophy or tone abnormalities are noted.  NEURO: BUE & BLE coordination and muscle stretch reflexes are physiologic and symmetric.  Plantar response are downgoing. No clonus.  No loss of  sensation is noted.  GAIT:  Slow, antalgic.         ASSESSMENT: 58 y.o. year old male with chronic lower back and leg pain, consistent with     No diagnosis found.        PLAN:   1. Interventional:   - Schedule repeat Bilateral L4/5 TF QING. Patient is not taking prescription blood thinners or ASA.    - S/p Bilateral L4/5 TF QING on 3/16/23 with 100% pain relief x 2 weeks, but pain started to return after that, now approaching back to baseline 4 weeks post.    2. Pharmacologic:   - Start Robaxin (methocarbamol) 500mg BID PRN muscle spasms (or can take 1/2 tablet).  he understands this could cause drowsiness.    - Consider trying Lyrica. Previously stopped gabapentin (Neurontin) 300mg TID - no longer taking due to SE of itching, brain fog.  - Continue with Suboxone from outside provider.   - Anticoagulation use: None.  - LA  reviewed and appropriate.       3. Rehabilitative: Encouraged regular exercise. Continue exercises and activities as tolerated.     4. Diagnostic: None for now.    5. Follow up: 4 weeks post-injection - will send online ticket scheduling on [employer] - in clinic (per pt request)     - This condition does not require this patient to take time off of work, and the primary goal of our Pain Management services is to improve the patient's functional capacity.   - I discussed the risks, benefits, and alternatives to potential treatment options. All questions and concerns were fully addressed today in clinic.         Sherly Esteves PA-C  Interventional Pain Management - [facility] [place]    Disclaimer:  This note was prepared using voice recognition system and is likely to have sound alike errors that may have been overlooked even after proof reading.  Please call me with any questions.

## 2023-06-13 ENCOUNTER — PATIENT MESSAGE (OUTPATIENT)
Dept: FAMILY MEDICINE | Facility: CLINIC | Age: 58
End: 2023-06-13
Payer: MEDICARE

## 2023-06-13 DIAGNOSIS — R09.81 CHRONIC NASAL CONGESTION: ICD-10-CM

## 2023-06-13 RX ORDER — FLUTICASONE PROPIONATE 50 MCG
1 SPRAY, SUSPENSION (ML) NASAL DAILY
Qty: 16 G | Refills: 3 | Status: SHIPPED | OUTPATIENT
Start: 2023-06-13 | End: 2023-07-10 | Stop reason: SDUPTHER

## 2023-06-13 NOTE — TELEPHONE ENCOUNTER
No care due was identified.  [facility] Embedded Care Due Messages. Reference number: [id number].   6/13/2023 12:58:42 AM CDT

## 2023-07-05 ENCOUNTER — OFFICE VISIT (OUTPATIENT)
Dept: PAIN MEDICINE | Facility: CLINIC | Age: 58
End: 2023-07-05
Payer: MEDICARE

## 2023-07-05 ENCOUNTER — PATIENT MESSAGE (OUTPATIENT)
Dept: NEUROSURGERY | Facility: CLINIC | Age: 58
End: 2023-07-05
Payer: MEDICARE

## 2023-07-05 VITALS
HEIGHT: 71 IN | HEART RATE: 83 BPM | RESPIRATION RATE: 18 BRPM | DIASTOLIC BLOOD PRESSURE: 64 MMHG | SYSTOLIC BLOOD PRESSURE: 132 MMHG | WEIGHT: 315 LBS | BODY MASS INDEX: 44.1 KG/M2

## 2023-07-05 DIAGNOSIS — E66.01 MORBID OBESITY WITH BMI OF 50.0-59.9, ADULT: ICD-10-CM

## 2023-07-05 DIAGNOSIS — M54.16 LUMBAR RADICULOPATHY: Primary | ICD-10-CM

## 2023-07-05 DIAGNOSIS — M48.061 SPINAL STENOSIS OF LUMBAR REGION WITHOUT NEUROGENIC CLAUDICATION: ICD-10-CM

## 2023-07-05 DIAGNOSIS — M51.36 DDD (DEGENERATIVE DISC DISEASE), LUMBAR: ICD-10-CM

## 2023-07-05 PROCEDURE — 99214 OFFICE O/P EST MOD 30 MIN: CPT | Mod: PBBFAC | Performed by: PHYSICAL MEDICINE & REHABILITATION

## 2023-07-05 PROCEDURE — 99214 PR OFFICE/OUTPT VISIT, EST, LEVL IV, 30-39 MIN: ICD-10-PCS | Mod: S$PBB,,, | Performed by: PHYSICAL MEDICINE & REHABILITATION

## 2023-07-05 PROCEDURE — 99999 PR PBB SHADOW E&M-EST. PATIENT-LVL IV: ICD-10-PCS | Mod: PBBFAC,,, | Performed by: PHYSICAL MEDICINE & REHABILITATION

## 2023-07-05 PROCEDURE — 99214 OFFICE O/P EST MOD 30 MIN: CPT | Mod: S$PBB,,, | Performed by: PHYSICAL MEDICINE & REHABILITATION

## 2023-07-05 PROCEDURE — 99999 PR PBB SHADOW E&M-EST. PATIENT-LVL IV: CPT | Mod: PBBFAC,,, | Performed by: PHYSICAL MEDICINE & REHABILITATION

## 2023-07-05 RX ORDER — GABAPENTIN 300 MG/1
CAPSULE ORAL
Qty: 90 CAPSULE | Refills: 11 | Status: SHIPPED | OUTPATIENT
Start: 2023-07-05

## 2023-07-05 NOTE — PROGRESS NOTES
Established Patient Chronic Pain Note (Follow up visit)    Chief Complaint:   Chief Complaint   Patient presents with    Low-back Pain     Patient has pain in lower back pain.  Pain scale 4/10, more with movement   lumbosacral area and radiates to the bilateral hips occasionally      SUBJECTIVE:    Interval History (7/5/2023):  Patient presents today for follow-up visit.  he underwent Bilateral L4/5 TF QING on 5/2/23.  The patient reports that he is/was unchanged following the procedure.  he reports limited pain relief.    Patient reports pain as  4/10  today.  Unchanged he locates the pain to the same area and is of the same type and quality.    Interval History (4/28/2023): Robert Munoz presents today for follow-up visit.  he underwent Bilateral L4/5 TF QING on 3/16/23.  The patient reports that he is/was better following the procedure.  he reports 100% pain relief.  The changes lasted 2 weeks, but pain started to return after that, now approaching back to baseline.  Also c/o muscle tightness.  Patient reports pain as 4/10 today but Increases with activity.    Interval History (2/21/2023):  Robert Munoz presents today for follow-up visit.  Patient was last seen on 1/4/2023 (6 weeks ago). At that visit, the plan was to schedule injection, but it wasn't completed. Patient reports pain as 3/10 today.  Pain significantly increases with any activity including bending over to do laundry or dishes.  Pain increases to 9/10.  That he was unable to complete the injection previously because of uncontrolled blood pressure and wheezing.  Since then, he saw primary care and Cardiology, and his wheezing and shortness of breath has improved with Medrol Dosepak.    Interval HPI (1/4/2023):  Robert Munoz is a 57 y.o. male who presents to the clinic for a follow-up appointment for lower back pain. Since the last visit, Robert Munoz states the pain has been persistant. Current pain intensity is 8/10.  He continues locates the pain  to the lumbosacral region with radiation into the extremities bilaterally, mostly at night.  He has tried many different conservative treatments including medications, physical therapy and undressed without much resolution of his pain/symptoms.  Patient denies night fever/night sweats, urinary incontinence, bowel incontinence, significant weight loss, significant motor weakness, and loss of sensations.    Initial HPI 08/19/2021:  Robert Munoz is a 56 y.o. male who presents to the clinic for the evaluation of lower back pain.  He was referred by his primary care provider for further evaluation management of this pain.  He has a past medical history of hypertension, morbid obesity, and multiple other medical comorbidities as listed in his chart.  The pain started 20+ ago following an injury he sustained while working on a roof that caved in and collapsed and symptoms have been unchanged. The pain is located in the lumbosacral area and radiates to the bilateral hips occasionally.  The pain is described as Aching and is rated as 6/10. The pain is rated with a score of  5/10 on the BEST day and a score of 10/10 on the WORST day.  Symptoms interfere with daily activity. The pain is exacerbated by movement.  The pain is mitigated by medications and rest. The patient reports spending 2-4 hours per day reclining. The patient reports 5-7 hours of uninterrupted sleep per night.     Pain Disability Index Review:   No flowsheet data found.       Non-Pharmacologic Treatments:  Physical Therapy/Home Exercise: yes  Ice/Heat:yes  TENS: no  Acupuncture: no  Massage: no  Chiropractic: no    Other: no        Pain Medications:  - Opioids: Suboxone  - Adjuvant Medications: Denies  - Anti-Coagulants: None       Pain Procedures:   - Bilateral L4/5 TF QING on 3/16/23 with 100% pain relief x 2 weeks, but pain started to return after that, now approaching back to baseline 4 weeks post     -Bilateral L4-5 TFESI on 05/02/2023, limited relief         Imaging (Reviewed on 7/5/2023):     MRI lumbar spine 11/27/2020:  There is mild dextroscoliosis of the lumbar spine.  There is a few mm of anterolisthesis of L5 on S1. The vertebral body heights are well maintained, with no fracture.  A few multilevel Schmorl's nodes are noted.  No marrow signal abnormality suspicious for an infiltrative process.     The conus medullaris terminates at approximately the L1-L2 disk space.  The adjacent soft tissue structures show no significant abnormalities.  There is disc desiccation noted throughout the lumbar spine with mild-to-moderate multilevel disc space narrowing.     L1-L2: Mild diffuse disc bulge resulting in no significant central or neural foraminal canal narrowing.     L2-L3: Mild diffuse disc bulge resulting in no significant central canal narrowing.  There is mild narrowing of the inferior recess of the right neural foraminal canal.     L3-L4: Mild diffuse disc bulge resulting in no significant central or neural foraminal canal narrowing.     L4-L5:  Mild diffuse disc bulge and moderate bilateral facet arthropathy resulting in no significant central canal narrowing.  There is some narrowing of either lateral recess.  The left neural foraminal canal is severely narrowed.  The right neural foraminal canal is at least moderately narrowed.     L5-S1:  Grade 1 spondylolisthesis resulting in no significant central canal narrowing.  Prominent epidural fat surrounding the thecal sac at this level.  The left neural foraminal canal is minimally narrowed.  The right neural foraminal canal is at least moderately narrowed.     X-ray lumbar spine 11/17/2020:  Mild dextroscoliosis.  Vertebral body heights maintained without definite spondylolisthesis.  Multilevel disc height loss noted most prevalent L3-4.  Multilevel osteophyte findings present, largest at the right aspect of the L1-2 level.  Mid and lower lumbar spine facet arthropathy noted.  No acute osseous or soft tissue  "abnormality.             REVIEW OF SYSTEMS:    GENERAL:  No weight loss, malaise or fevers.  HEENT:   No recent changes in vision or hearing  NECK:  Negative for lumps, no difficulty with swallowing.  RESPIRATORY:  Negative for cough, wheezing or shortness of breath, patient denies any recent URI.  CARDIOVASCULAR:  Negative for chest pain, leg swelling or palpitations.  GI:  Negative for abdominal discomfort, blood in stools or black stools or change in bowel habits.  MUSCULOSKELETAL:  See HPI.  SKIN:  Negative for lesions, rash, and itching.  PSYCH:  No mood disorder or recent psychosocial stressors.  Patients sleep is not disturbed secondary to pain.  HEMATOLOGY/LYMPHOLOGY:  Negative for prolonged bleeding, bruising easily or swollen nodes.  Patient is not currently taking any anti-coagulants  NEURO:   No history of headaches, syncope, paralysis, seizures or tremors.  All other reviewed and negative other than HPI.      OBJECTIVE:    Physical Exam:   Vitals:    07/05/23 1327   BP: 132/64   Pulse: 83   Resp: 18   Weight: (!) 174.2 kg (383 lb 14.9 oz)   Height: 5' 11" (1.803 m)   PainSc:   4     Body mass index is 53.55 kg/m².   (reviewed on 7/5/2023)     GENERAL: Well appearing, in no acute distress, alert and oriented x3.  Morbidly obese  PSYCH:  Mood and affect appropriate.  SKIN: Skin color, texture, turgor normal, no rashes or lesions.  HEAD/FACE:  Normocephalic, atraumatic.    PULM: No evidence of respiratory difficulty, symmetric chest rise.  GI:  Soft and non-tender.  BACK: SLR is equivocal to radicular pain.  Moderate TTP over the facet joints of the lumbar spine and lumbar paraspinals.  Limited ROM secondary to body habitus and pain reproduction.  EXTREMITIES:No deformities, edema, or skin discoloration.    MUSCULOSKELETAL:  Hip and knee provocative maneuvers are unremarkable.  There is minimal pain with palpation over the sacroiliac joints bilaterally.  BUE & BLE strength is normal and symmetric.  No " atrophy or tone abnormalities are noted.  NEURO: BUE & BLE coordination and muscle stretch reflexes are physiologic and symmetric.  Plantar response are downgoing. No clonus.  No loss of sensation is noted.  GAIT:  Slow, antalgic.         ASSESSMENT: 58 y.o. year old male with chronic lower back and leg pain, consistent with     1. Lumbar radiculopathy  Ambulatory referral/consult to Neurosurgery      2. DDD (degenerative disc disease), lumbar  Ambulatory referral/consult to Neurosurgery      3. Morbid obesity with BMI of 50.0-59.9, adult        4. Spinal stenosis of lumbar region without neurogenic claudication                PLAN:   - Interventional:   - none at this time, consider via disc if he is not a candidate for lumbar decompression surgery at the L4-5 level  - S/p Bilateral L4/5 TF QING on 3/16/23 with 100% pain relief x 2 weeks, s/p bilateral L4/5 TFESI on 05/02/2023 with limited relief    - Pharmacologic:   - continue Robaxin (methocarbamol) 500mg BID PRN muscle spasms (or can take 1/2 tablet).  he understands this could cause drowsiness.    - retry gabapentin at 300 mg titration to t.i.d. dosing.  - Continue with Suboxone from outside provider.   - Anticoagulation use: None.  - LA  reviewed and appropriate.       - Rehabilitative: Encouraged regular exercise. Continue exercises and activities as tolerated.     - Diagnostic: None for now.    -consult: Neurosurgery    - Follow up:  3 months or as needed    - This condition does not require this patient to take time off of work, and the primary goal of our Pain Management services is to improve the patient's functional capacity.     - I discussed the risks, benefits, and alternatives to potential treatment options. All questions and concerns were fully addressed today in clinic.       Dony Bean MD  Interventional Pain Medicine  [facility] [place]      Disclaimer:  This note was prepared using voice recognition system and is likely to have  sound alike errors that may have been overlooked even after proof reading.  Please call me with any questions.

## 2023-07-21 ENCOUNTER — OFFICE VISIT (OUTPATIENT)
Dept: FAMILY MEDICINE | Facility: CLINIC | Age: 58
End: 2023-07-21
Payer: MEDICARE

## 2023-07-21 VITALS
TEMPERATURE: 99 F | BODY MASS INDEX: 44.1 KG/M2 | HEART RATE: 89 BPM | OXYGEN SATURATION: 95 % | WEIGHT: 315 LBS | HEIGHT: 71 IN | DIASTOLIC BLOOD PRESSURE: 60 MMHG | SYSTOLIC BLOOD PRESSURE: 132 MMHG

## 2023-07-21 DIAGNOSIS — L72.9 SKIN CYST: ICD-10-CM

## 2023-07-21 DIAGNOSIS — R79.9 ABNORMAL FINDING OF BLOOD CHEMISTRY, UNSPECIFIED: ICD-10-CM

## 2023-07-21 DIAGNOSIS — M54.16 LUMBAR RADICULOPATHY: ICD-10-CM

## 2023-07-21 DIAGNOSIS — I10 PRIMARY HYPERTENSION: Primary | ICD-10-CM

## 2023-07-21 DIAGNOSIS — G47.30 SLEEP APNEA, UNSPECIFIED TYPE: ICD-10-CM

## 2023-07-21 DIAGNOSIS — E66.01 MORBID OBESITY WITH BMI OF 50.0-59.9, ADULT: ICD-10-CM

## 2023-07-21 PROCEDURE — 99999 PR PBB SHADOW E&M-EST. PATIENT-LVL IV: CPT | Mod: PBBFAC,,, | Performed by: FAMILY MEDICINE

## 2023-07-21 PROCEDURE — 99214 OFFICE O/P EST MOD 30 MIN: CPT | Mod: PBBFAC,PO | Performed by: FAMILY MEDICINE

## 2023-07-21 PROCEDURE — 99214 OFFICE O/P EST MOD 30 MIN: CPT | Mod: S$PBB,,, | Performed by: FAMILY MEDICINE

## 2023-07-21 PROCEDURE — 99999 PR PBB SHADOW E&M-EST. PATIENT-LVL IV: ICD-10-PCS | Mod: PBBFAC,,, | Performed by: FAMILY MEDICINE

## 2023-07-21 PROCEDURE — 99214 PR OFFICE/OUTPT VISIT, EST, LEVL IV, 30-39 MIN: ICD-10-PCS | Mod: S$PBB,,, | Performed by: FAMILY MEDICINE

## 2023-07-21 NOTE — PROGRESS NOTES
Subjective:       Patient ID: Robert Munoz is a 58 y.o. male.    Chief Complaint: Follow-up      HPI Comments:       Current Outpatient Medications:     albuterol (PROVENTIL/VENTOLIN HFA) 90 mcg/actuation inhaler, Inhale 2 puffs into the lungs every 4 (four) hours as needed for Wheezing., Disp: 18 g, Rfl: 2    clobetasol 0.05% (TEMOVATE) 0.05 % Oint, Apply topically 2 (two) times daily., Disp: 30 g, Rfl: 1    ferrous gluconate (FERGON) 324 MG tablet, Take 1 tablet (324 mg total) by mouth 2 (two) times daily with meals., Disp: 180 tablet, Rfl: 5    fluticasone propionate (FLONASE) 50 mcg/actuation nasal spray, 1 spray (50 mcg total) by Each Nostril route once daily., Disp: 16 g, Rfl: 3    furosemide (LASIX) 40 MG tablet, Take 1 tablet (40 mg total) by mouth daily as needed (for swelling). Take for 3 days, then as needed for swelling, Disp: 30 tablet, Rfl: 11    gabapentin (NEURONTIN) 300 MG capsule, 1 cap qhs X 3 days then 1 cap BID X 3 days then 1 cap TID and continue. Stay at the most comfortable dose., Disp: 90 capsule, Rfl: 11    methocarbamoL (ROBAXIN) 500 MG Tab, Take 1 tablet (500 mg total) by mouth 2 (two) times daily as needed (muscle spasms). May cause drowsiness., Disp: 60 tablet, Rfl: 0    nebivoloL (BYSTOLIC) 20 mg Tab, Take 20 mg by mouth once daily., Disp: 30 tablet, Rfl: 6    SUBOXONE 12-3 mg Film, Place 0.5 Film under the tongue 3 (three) times daily., Disp: , Rfl:     tamsulosin (FLOMAX) 0.4 mg Cap, Take 1 capsule (0.4 mg total) by mouth once daily., Disp: 30 capsule, Rfl: 11    valsartan-hydrochlorothiazide (DIOVAN-HCT) 320-25 mg per tablet, Take 1 tablet by mouth once daily., Disp: 90 tablet, Rfl: 3    levocetirizine (XYZAL) 5 MG tablet, Take 1 tablet (5 mg total) by mouth every evening., Disp: 30 tablet, Rfl: 11  No current facility-administered medications for this visit.    Facility-Administered Medications Ordered in Other Visits:     ondansetron injection 4 mg, 4 mg, Intravenous, Once PRN,  "Dony Bean MD      He is here to establish care.  Was upset with his previous provider for putting him on a medication that caused his legs to swell (nifedipine).  Saw cardiology, got off the medication, and is doing much better.  Still has significant lower extremity edema with accompanying skin changes, but he feels much happier with the current state.      On Suboxone..  Dr. Mccormack.  Going to be going to [place] to see neuro surgery as his pain management treatments here have seemed to hit a did and.  Does not want to take the gabapentin that was prescribed.      Has a diagnosis of HODA.  Not using CPAP.  Says his machine has been sitting at home but no whenever explained to him.  Will get this corrected I hope.      Has a cyst on the back of his head would like to see Dermatology.              Review of Systems   Constitutional:  Negative for activity change, appetite change and fever.   HENT:  Negative for sore throat.    Respiratory:  Negative for cough and shortness of breath.    Cardiovascular:  Positive for leg swelling. Negative for chest pain.   Gastrointestinal:  Negative for abdominal pain, diarrhea and nausea.   Genitourinary:  Negative for difficulty urinating.   Musculoskeletal:  Negative for arthralgias and myalgias.   Neurological:  Negative for dizziness and headaches.     Objective:      Vitals:    07/21/23 1635   BP: 132/60   Pulse: 89   Temp: 98.6 °F (37 °C)   TempSrc: Tympanic   SpO2: 95%   Weight: (!) 147.2 kg (324 lb 8.3 oz)   Height: 5' 11" (1.803 m)     Physical Exam  Vitals and nursing note reviewed.   Constitutional:       General: He is not in acute distress.     Appearance: He is well-developed. He is not diaphoretic.   HENT:      Head: Normocephalic.        Comments: Intradermal cyst.  5 mm     Mouth/Throat:      Pharynx: No oropharyngeal exudate.   Neck:      Thyroid: No thyromegaly.   Cardiovascular:      Rate and Rhythm: Normal rate and regular rhythm.      Heart " sounds: Normal heart sounds. No murmur heard.  Pulmonary:      Effort: Pulmonary effort is normal.      Breath sounds: Normal breath sounds. No wheezing or rales.   Abdominal:      General: There is no distension.      Palpations: Abdomen is soft.   Musculoskeletal:      Cervical back: Neck supple.      Right lower leg: Edema present.      Left lower leg: Edema present.   Lymphadenopathy:      Cervical: No cervical adenopathy.   Skin:     General: Skin is warm and dry.   Neurological:      Mental Status: He is alert and oriented to person, place, and time.   Psychiatric:         Behavior: Behavior normal.         Thought Content: Thought content normal.         Judgment: Judgment normal.       Assessment:       1. Primary hypertension    2. Morbid obesity with BMI of 50.0-59.9, adult    3. Sleep apnea, unspecified type    4. Lumbar radiculopathy    5. Skin cyst    6. Abnormal finding of blood chemistry, unspecified        Plan:   Primary hypertension  Comments:  Controlled.  Follow-up 6 months  Orders:  -     Basic Metabolic Panel; Future; Expected date: 07/21/2023  -     CBC Auto Differential; Future; Expected date: 07/21/2023  -     Hemoglobin A1C; Future; Expected date: 07/21/2023    Morbid obesity with BMI of 50.0-59.9, adult  Comments:  He is obviously struggling with weight loss.  A1c today    Sleep apnea, unspecified type  Comments:  Sleep consult  to help with CPAP  Orders:  -     Ambulatory referral/consult to Sleep Disorders; Future; Expected date: 07/28/2023    Lumbar radiculopathy  Comments:  Neurosurgery to evaluate next in [place]    Skin cyst  Comments:  Dermatology consult  Orders:  -     Ambulatory referral/consult to Dermatology; Future; Expected date: 07/28/2023    Abnormal finding of blood chemistry, unspecified  -     Hemoglobin A1C; Future; Expected date: 07/21/2023

## 2023-08-25 RX ORDER — NEBIVOLOL 20 MG/1
1 TABLET ORAL
Qty: 90 TABLET | Refills: 2 | Status: SHIPPED | OUTPATIENT
Start: 2023-08-25

## 2023-08-25 NOTE — TELEPHONE ENCOUNTER
No care due was identified.  [facility] Embedded Care Due Messages. Reference number: [id number].   8/25/2023 2:02:45 PM CDT

## 2023-08-25 NOTE — TELEPHONE ENCOUNTER
Refill Routing Note   Medication(s) are not appropriate for processing by [facility] for the following reason(s):      No active prescription written by provider    [facility] action(s):  Defer Care Due:  None identified            Appointments  past 12m or future 3m with PCP    Date Provider   Last Visit   5/25/2023 Alisia Guardado MD   Next Visit   Visit date not found Alisia Guardado MD   ED visits in past 90 days: 0        Note composed:2:06 PM 08/25/2023

## 2023-09-11 DIAGNOSIS — I10 HYPERTENSION NOT AT GOAL: ICD-10-CM

## 2023-09-11 DIAGNOSIS — M79.89 LEG SWELLING: ICD-10-CM

## 2023-09-11 DIAGNOSIS — R06.09 DOE (DYSPNEA ON EXERTION): Primary | ICD-10-CM

## 2023-09-13 DIAGNOSIS — R09.81 CHRONIC NASAL CONGESTION: ICD-10-CM

## 2023-09-13 RX ORDER — FLUTICASONE PROPIONATE 50 MCG
1 SPRAY, SUSPENSION (ML) NASAL DAILY
Qty: 16 G | Refills: 3 | Status: SHIPPED | OUTPATIENT
Start: 2023-09-13 | End: 2023-11-17 | Stop reason: SDUPTHER

## 2023-09-13 NOTE — TELEPHONE ENCOUNTER
No care due was identified.  [facility] Embedded Care Due Messages. Reference number: [id number].   9/13/2023 5:43:44 AM CDT

## 2023-09-18 ENCOUNTER — TELEPHONE (OUTPATIENT)
Dept: NEUROSURGERY | Facility: CLINIC | Age: 58
End: 2023-09-18
Payer: MEDICARE

## 2023-09-18 NOTE — TELEPHONE ENCOUNTER
Reached out to patient about NRSX referral from Dr. Bean. It was originally handled by MELANIE Palmer and patient was a no show to appt. Called patient today for scheduling, no answer, left message.  RD

## 2023-09-20 DIAGNOSIS — M79.89 LEG SWELLING: ICD-10-CM

## 2023-09-20 DIAGNOSIS — R06.09 DOE (DYSPNEA ON EXERTION): ICD-10-CM

## 2023-09-20 DIAGNOSIS — I10 HYPERTENSION NOT AT GOAL: Primary | ICD-10-CM

## 2023-10-17 ENCOUNTER — TELEPHONE (OUTPATIENT)
Dept: PULMONOLOGY | Facility: CLINIC | Age: 58
End: 2023-10-17
Payer: MEDICARE

## 2023-11-16 DIAGNOSIS — I10 HYPERTENSION NOT AT GOAL: Primary | ICD-10-CM

## 2023-11-16 DIAGNOSIS — R06.09 DOE (DYSPNEA ON EXERTION): ICD-10-CM

## 2023-11-17 DIAGNOSIS — R09.81 CHRONIC NASAL CONGESTION: ICD-10-CM

## 2023-11-18 RX ORDER — FLUTICASONE PROPIONATE 50 MCG
1 SPRAY, SUSPENSION (ML) NASAL DAILY
Qty: 16 G | Refills: 0 | Status: SHIPPED | OUTPATIENT
Start: 2023-11-18 | End: 2024-03-18 | Stop reason: SDUPTHER

## 2023-11-18 NOTE — TELEPHONE ENCOUNTER
No care due was identified.  [facility] Embedded Care Due Messages. Reference number: [id number].   11/17/2023 7:02:49 PM CST

## 2023-11-21 DIAGNOSIS — I10 HYPERTENSION NOT AT GOAL: Primary | ICD-10-CM

## 2023-11-22 ENCOUNTER — HOSPITAL ENCOUNTER (OUTPATIENT)
Dept: CARDIOLOGY | Facility: HOSPITAL | Age: 58
Discharge: HOME | End: 2023-11-22
Attending: STUDENT IN AN ORGANIZED HEALTH CARE EDUCATION/TRAINING PROGRAM
Payer: MEDICARE

## 2023-11-22 ENCOUNTER — OFFICE VISIT (OUTPATIENT)
Dept: CARDIOLOGY | Facility: CLINIC | Age: 58
End: 2023-11-22
Payer: MEDICARE

## 2023-11-22 VITALS
RESPIRATION RATE: 16 BRPM | SYSTOLIC BLOOD PRESSURE: 117 MMHG | BODY MASS INDEX: 44.1 KG/M2 | HEART RATE: 93 BPM | OXYGEN SATURATION: 97 % | DIASTOLIC BLOOD PRESSURE: 71 MMHG | WEIGHT: 315 LBS | HEIGHT: 71 IN

## 2023-11-22 DIAGNOSIS — I87.2 VENOUS STASIS DERMATITIS, UNSPECIFIED LATERALITY: ICD-10-CM

## 2023-11-22 DIAGNOSIS — M54.10 RADICULOPATHY, UNSPECIFIED SPINAL REGION: ICD-10-CM

## 2023-11-22 DIAGNOSIS — R06.09 DOE (DYSPNEA ON EXERTION): ICD-10-CM

## 2023-11-22 DIAGNOSIS — I10 HYPERTENSION NOT AT GOAL: ICD-10-CM

## 2023-11-22 DIAGNOSIS — Z72.0 TOBACCO ABUSE: ICD-10-CM

## 2023-11-22 DIAGNOSIS — M79.89 LEG SWELLING: ICD-10-CM

## 2023-11-22 DIAGNOSIS — Z72.0 CHEWS TOBACCO REGULARLY: ICD-10-CM

## 2023-11-22 DIAGNOSIS — I10 HYPERTENSION NOT AT GOAL: Primary | ICD-10-CM

## 2023-11-22 DIAGNOSIS — I10 HYPERTENSION, UNSPECIFIED TYPE: ICD-10-CM

## 2023-11-22 DIAGNOSIS — E66.01 MORBID OBESITY WITH BMI OF 50.0-59.9, ADULT: ICD-10-CM

## 2023-11-22 PROCEDURE — 99999 PR PBB SHADOW E&M-EST. PATIENT-LVL IV: CPT | Mod: PBBFAC,,, | Performed by: STUDENT IN AN ORGANIZED HEALTH CARE EDUCATION/TRAINING PROGRAM

## 2023-11-22 PROCEDURE — 99214 OFFICE O/P EST MOD 30 MIN: CPT | Mod: PBBFAC | Performed by: STUDENT IN AN ORGANIZED HEALTH CARE EDUCATION/TRAINING PROGRAM

## 2023-11-22 PROCEDURE — 99214 PR OFFICE/OUTPT VISIT, EST, LEVL IV, 30-39 MIN: ICD-10-PCS | Mod: S$PBB,,, | Performed by: STUDENT IN AN ORGANIZED HEALTH CARE EDUCATION/TRAINING PROGRAM

## 2023-11-22 PROCEDURE — 93010 EKG 12-LEAD: ICD-10-PCS | Mod: ,,, | Performed by: INTERNAL MEDICINE

## 2023-11-22 PROCEDURE — 99214 OFFICE O/P EST MOD 30 MIN: CPT | Mod: S$PBB,,, | Performed by: STUDENT IN AN ORGANIZED HEALTH CARE EDUCATION/TRAINING PROGRAM

## 2023-11-22 PROCEDURE — 93010 ELECTROCARDIOGRAM REPORT: CPT | Mod: ,,, | Performed by: INTERNAL MEDICINE

## 2023-11-22 PROCEDURE — 93005 ELECTROCARDIOGRAM TRACING: CPT

## 2023-11-22 PROCEDURE — 99999 PR PBB SHADOW E&M-EST. PATIENT-LVL IV: ICD-10-PCS | Mod: PBBFAC,,, | Performed by: STUDENT IN AN ORGANIZED HEALTH CARE EDUCATION/TRAINING PROGRAM

## 2023-11-22 NOTE — PROGRESS NOTES
Section of Cardiology                  Cardiac Clinic Note    Chief Complaint/Reason for consultation: establish care       HPI:   Robert Munoz is a 58 y.o. male with h/o MALIA, tobacco ab use, HTN, obesity, chronic back pain who was rferred to cardiology clinic by PCP Dr. Guardado for evaluation.     2/20/23  Comes in to establish care  Knows he needs to lose weight  Does not exercise much   Gets a little SOB, but doesn't walk much due to back pain  If walks a long distance, gets SOB  Was placed on inhalers for possible asthma   Reports LE swelling in his feet, takes nifedipine   Drinks tea, trying to get reduce f sugar intake   Trying to drink more water   Since 10/22 has gained 17 lbs  Has significant swelling in legs, thought due to burning from detergent recently     Denies chest pain, syncope, palpitations, PND, orthopnea     Family history: dad- ETOH, heart attacks     Reports dipping tobacco, denies ETOH abuse (stopped long time ago)  Denies CVA, diabetes       4/24/23  Leg swelling significantly improved  Still having feet swelling   Sleeps on one pillow   Has back pain, got 2 shots in his back, has helped his pain   SOB, improved  Watching his salt intake   Trying to quit dipping tobacco   No chest, syncope, dizziness, palpitations       11/22/23  Doing well  Got a new car  LE swelling improved  Wears compression stockings  SOB has improved   BP stable today  Weight fluctuates, lost 5 lbs since 5/23- eating more salads, no fried foods   Watches salt intake   Takes lasix prn        EKG 11/22/23 NSR, incomplete RBBB  EKG 2/20/23 ST, no acute ST - T wave changes    ECHO  2/23  The left ventricle is normal in size with concentric remodeling and normal systolic function.  Normal left ventricular diastolic function.  The estimated PA systolic pressure is 24 mmHg.  Normal right ventricular size with normal right ventricular systolic function.  Intermediate central venous pressure (8 mmHg).  The  estimated ejection fraction is 55%.    STRESS TEST    [facility]      ROS: All 10 systems reviewed. Please refer to the HPI for pertinent positives. All other systems negative.     Past Medical History  Past Medical History:   Diagnosis Date    Asthma     Hypertension     Sleep apnea        Surgical History  Past Surgical History:   Procedure Laterality Date    COLONOSCOPY N/A 10/13/2021    Procedure: COLONOSCOPY;  Surgeon: Eula Dominguez MD;  Location[facility];  Service: Endoscopy;  Laterality: N/A;    ESOPHAGOGASTRODUODENOSCOPY N/A 10/27/2022    Procedure: EGD (ESOPHAGOGASTRODUODENOSCOPY);  Surgeon: Juliette Sawant MD;  Location: [facility];  Service: Endoscopy;  Laterality: N/A;    SELECTIVE INJECTION OF ANESTHETIC AGENT AROUND LUMBAR SPINAL NERVE ROOT BY TRANSFORAMINAL APPROACH Bilateral 3/16/2023    Procedure: Bilateral L4/5 TF QING;  Surgeon: Dony Bean MD;  Location: [facility];  Service: Pain Management;  Laterality: Bilateral;    SELECTIVE INJECTION OF ANESTHETIC AGENT AROUND LUMBAR SPINAL NERVE ROOT BY TRANSFORAMINAL APPROACH Bilateral 5/2/2023    Procedure: Bilateral L4/5 TF QING;  Surgeon: Dony Bean MD;  Location: [facility];  Service: Pain Management;  Laterality: Bilateral;          Allergies:   Review of patient's allergies indicates:  No Known Allergies    Social History:  Social History     Socioeconomic History    Marital status:    Tobacco Use    Smoking status: Never    Smokeless tobacco: Current     Types: Snuff    Tobacco comments:     1 can daily    Substance and Sexual Activity    Alcohol use: No    Drug use: No    Sexual activity: Yes     Social Determinants of Health     Financial Resource Strain: Medium Risk (11/17/2023)    Overall Financial Resource Strain (CARDIA)     Difficulty of Paying Living Expenses: Somewhat hard   Food Insecurity: No Food Insecurity (11/17/2023)    Hunger Vital Sign     Worried About Running Out of Food in the Last Year: Never true      Ran Out of Food in the Last Year: Never true   Transportation Needs: Unmet Transportation Needs (11/17/2023)    PRAPARE - Transportation     Lack of Transportation (Medical): Yes     Lack of Transportation (Non-Medical): Yes   Physical Activity: Insufficiently Active (11/17/2023)    Exercise Vital Sign     Days of Exercise per Week: 2 days     Minutes of Exercise per Session: 10 min   Stress: No Stress Concern Present (11/17/2023)    [place] [employer] - Occupational Stress Questionnaire     Feeling of Stress : Not at all   Social Connections: Unknown (11/17/2023)    Social Connection and Isolation Panel [NHANES]     Frequency of Communication with Friends and Family: More than three times a week     Frequency of Social Gatherings with Friends and Family: Twice a week     Active Member of Clubs or Organizations: No     Attends Club or Organization Meetings: Never     Marital Status: Living with partner   Housing Stability: Unknown (11/17/2023)    Housing Stability Vital Sign     Unable to Pay for Housing in the Last Year: No     Unstable Housing in the Last Year: No       Family History:  family history includes Anuerysm in his mother; Diabetes in his mother; Heart disease in his father.    Home Medications:  Current Outpatient Medications on File Prior to Visit   Medication Sig Dispense Refill    albuterol (PROVENTIL/VENTOLIN HFA) 90 mcg/actuation inhaler Inhale 2 puffs into the lungs every 4 (four) hours as needed for Wheezing. 18 g 2    fluticasone propionate (FLONASE) 50 mcg/actuation nasal spray 1 spray (50 mcg total) by Each Nostril route once daily. 16 g 0    furosemide (LASIX) 40 MG tablet Take 1 tablet (40 mg total) by mouth daily as needed (for swelling). Take for 3 days, then as needed for swelling 30 tablet 11    nebivoloL (BYSTOLIC) 20 mg Tab TAKE 1 TABLET BY MOUTH ONCE DAILY 90 tablet 2    SUBOXONE 12-3 mg Film Place 0.5 Film under the tongue 3 (three) times daily.       "valsartan-hydrochlorothiazide (DIOVAN-HCT) 320-25 mg per tablet Take 1 tablet by mouth once daily. 90 tablet 3    clobetasol 0.05% (TEMOVATE) 0.05 % Oint Apply topically 2 (two) times daily. 30 g 1    ferrous gluconate (FERGON) 324 MG tablet Take 1 tablet (324 mg total) by mouth 2 (two) times daily with meals. 180 tablet 5    gabapentin (NEURONTIN) 300 MG capsule 1 cap qhs X 3 days then 1 cap BID X 3 days then 1 cap TID and continue. Stay at the most comfortable dose. 90 capsule 11    levocetirizine (XYZAL) 5 MG tablet Take 1 tablet (5 mg total) by mouth every evening. 30 tablet 11    methocarbamoL (ROBAXIN) 500 MG Tab Take 1 tablet (500 mg total) by mouth 2 (two) times daily as needed (muscle spasms). May cause drowsiness. 60 tablet 0    tamsulosin (FLOMAX) 0.4 mg Cap Take 1 capsule (0.4 mg total) by mouth once daily. 30 capsule 11     Current Facility-Administered Medications on File Prior to Visit   Medication Dose Route Frequency Provider Last Rate Last Admin    ondansetron injection 4 mg  4 mg Intravenous Once PRN Dony Bean MD           Physical exam:  /71   Pulse 93   Resp 16   Ht 5' 11" (1.803 m)   Wt (!) 172.9 kg (381 lb 2.8 oz)   SpO2 97%   BMI 53.16 kg/m²         General: Pt is a 58 y.o. year old male who is AAOx3, in NAD, is pleasant, well nourished, looks stated age  HEENT: PERRL, EOMI, Oral mucosa pink & moist  CVS  No abnormal cardiac pulsations noted on inspection. JVP not raised. The apical impulse is normal on palpation, and is located in the left 5th intercostal space in the mid - clavicular line. No palpable thrills or abnormal pulsations noted. RR, S1 - S2 heard, no murmurs, rubs or gallops appreciated.   PUL : CTA B/L.   ABD : BS +, soft. No tenderness elicited   LE : Venous dermastasis. 1+ edema  Distal Pulses palpable B/L         LABS:    Chemistry:   Lab Results   Component Value Date     04/24/2023    K 4.6 04/24/2023     04/24/2023    CO2 26 04/24/2023    " "BUN 26 (H) 04/24/2023    CREATININE 1.2 04/24/2023    CALCIUM 9.2 04/24/2023     Cardiac Markers: No results found for: "CKTOTAL", "CKMB", "CKMBINDEX", "TROPONINI"  Cardiac Markers (Last 3): No results found for: "CKTOTAL", "CKMB", "CKMBINDEX", "TROPONINI"  CBC:   Lab Results   Component Value Date    WBC 13.60 (H) 11/03/2022    HGB 12.8 (L) 11/03/2022    HCT 41.0 11/03/2022    MCV 92 11/03/2022     11/03/2022     Lipids:   Lab Results   Component Value Date    CHOL 119 (L) 06/03/2022    TRIG 93 06/03/2022    HDL 29 (L) 06/03/2022     Coagulation: No results found for: "PT", "INR", "APTT"        Assessment    1. Hypertension not at goal    2. VIDAL (dyspnea on exertion)    3. Leg swelling    4. Venous stasis dermatitis, unspecified laterality    5. Chews tobacco regularly    6. Hypertension, unspecified type    7. Morbid obesity with BMI of 50.0-59.9, adult    8. Radiculopathy, unspecified spinal region          Plan:    VIDAL/LE swelling - improving   Continue bystolic 20 mg daily, Diovan-hctz  Continue compression stockings  Obtain BNP and CMP  Lasix p.r.n. for swelling    Tobacco abuse  Dipping cessation encouraged    Chronic back pain  Receiving prn spinal injections     HTN  Stable  Continue diovan-hctz, bystolic  Will enroll in digital medicine    Obesity, BMI 50-54.9  Low salt, low fat diet  Exercise as tolerated, at least 30 min daily        This note was prepared using voice recognition system and is likely to have sound alike errors that may have been overlooked even after proofreading.     I have reviewed all pertinent chart information.  Plans and recommendations have been formulated under my direct supervision. All questions answered and patient voiced understanding.   If symptoms persist go to the ED.    RTC in 6 months         Matty Hassan MD  Cardiology            "

## 2024-02-27 DIAGNOSIS — Z00.00 ENCOUNTER FOR MEDICARE ANNUAL WELLNESS EXAM: ICD-10-CM

## 2024-03-11 RX ORDER — VALSARTAN AND HYDROCHLOROTHIAZIDE 320; 25 MG/1; MG/1
1 TABLET, FILM COATED ORAL DAILY
Qty: 90 TABLET | Refills: 1 | Status: SHIPPED | OUTPATIENT
Start: 2024-03-11 | End: 2025-03-11

## 2024-03-18 DIAGNOSIS — R09.81 CHRONIC NASAL CONGESTION: ICD-10-CM

## 2024-03-18 RX ORDER — FLUTICASONE PROPIONATE 50 MCG
1 SPRAY, SUSPENSION (ML) NASAL DAILY
Qty: 16 G | Refills: 0 | Status: SHIPPED | OUTPATIENT
Start: 2024-03-18 | End: 2024-04-26 | Stop reason: SDUPTHER

## 2024-03-18 NOTE — TELEPHONE ENCOUNTER
No care due was identified.  [facility] Embedded Care Due Messages. Reference number: [id number].   3/18/2024 7:04:13 AM CDT

## 2024-04-26 DIAGNOSIS — R09.81 CHRONIC NASAL CONGESTION: ICD-10-CM

## 2024-04-26 NOTE — TELEPHONE ENCOUNTER
No care due was identified.  [facility] Embedded Care Due Messages. Reference number: [id number].   4/26/2024 3:15:41 PM CDT

## 2024-04-29 RX ORDER — FLUTICASONE PROPIONATE 50 MCG
1 SPRAY, SUSPENSION (ML) NASAL DAILY
Qty: 16 G | Refills: 3 | Status: SHIPPED | OUTPATIENT
Start: 2024-04-29 | End: 2024-05-29 | Stop reason: SDUPTHER

## 2024-05-07 ENCOUNTER — LAB VISIT (OUTPATIENT)
Dept: LAB | Facility: HOSPITAL | Age: 59
End: 2024-05-07
Attending: FAMILY MEDICINE
Payer: MEDICARE

## 2024-05-07 ENCOUNTER — OFFICE VISIT (OUTPATIENT)
Dept: FAMILY MEDICINE | Facility: CLINIC | Age: 59
End: 2024-05-07
Payer: MEDICARE

## 2024-05-07 VITALS
HEART RATE: 84 BPM | WEIGHT: 315 LBS | HEIGHT: 71 IN | DIASTOLIC BLOOD PRESSURE: 60 MMHG | TEMPERATURE: 99 F | BODY MASS INDEX: 44.1 KG/M2 | OXYGEN SATURATION: 96 % | SYSTOLIC BLOOD PRESSURE: 118 MMHG

## 2024-05-07 DIAGNOSIS — E66.01 MORBID OBESITY WITH BMI OF 50.0-59.9, ADULT: ICD-10-CM

## 2024-05-07 DIAGNOSIS — Z12.5 ENCOUNTER FOR SCREENING FOR MALIGNANT NEOPLASM OF PROSTATE: ICD-10-CM

## 2024-05-07 DIAGNOSIS — R79.9 ABNORMAL FINDING OF BLOOD CHEMISTRY, UNSPECIFIED: ICD-10-CM

## 2024-05-07 DIAGNOSIS — M54.16 LUMBAR RADICULOPATHY: ICD-10-CM

## 2024-05-07 DIAGNOSIS — I10 PRIMARY HYPERTENSION: ICD-10-CM

## 2024-05-07 DIAGNOSIS — G47.30 SLEEP APNEA, UNSPECIFIED TYPE: ICD-10-CM

## 2024-05-07 DIAGNOSIS — I10 PRIMARY HYPERTENSION: Primary | ICD-10-CM

## 2024-05-07 LAB
ALBUMIN SERPL BCP-MCNC: 3.9 G/DL (ref 3.5–5.2)
ALP SERPL-CCNC: 96 U/L (ref 55–135)
ALT SERPL W/O P-5'-P-CCNC: 18 U/L (ref 10–44)
ANION GAP SERPL CALC-SCNC: 11 MMOL/L (ref 8–16)
AST SERPL-CCNC: 14 U/L (ref 10–40)
BASOPHILS # BLD AUTO: 0.08 K/UL (ref 0–0.2)
BASOPHILS NFR BLD: 0.6 % (ref 0–1.9)
BILIRUB SERPL-MCNC: 0.3 MG/DL (ref 0.1–1)
BUN SERPL-MCNC: 32 MG/DL (ref 6–20)
CALCIUM SERPL-MCNC: 9.6 MG/DL (ref 8.7–10.5)
CHLORIDE SERPL-SCNC: 103 MMOL/L (ref 95–110)
CO2 SERPL-SCNC: 24 MMOL/L (ref 23–29)
COMPLEXED PSA SERPL-MCNC: 1.3 NG/ML (ref 0–4)
CREAT SERPL-MCNC: 1.7 MG/DL (ref 0.5–1.4)
DIFFERENTIAL METHOD BLD: ABNORMAL
EOSINOPHIL # BLD AUTO: 0.3 K/UL (ref 0–0.5)
EOSINOPHIL NFR BLD: 2 % (ref 0–8)
ERYTHROCYTE [DISTWIDTH] IN BLOOD BY AUTOMATED COUNT: 13.6 % (ref 11.5–14.5)
EST. GFR  (NO RACE VARIABLE): 45.6 ML/MIN/1.73 M^2
ESTIMATED AVG GLUCOSE: 120 MG/DL (ref 68–131)
GLUCOSE SERPL-MCNC: 90 MG/DL (ref 70–110)
HBA1C MFR BLD: 5.8 % (ref 4–5.6)
HCT VFR BLD AUTO: 41 % (ref 40–54)
HGB BLD-MCNC: 13 G/DL (ref 14–18)
IMM GRANULOCYTES # BLD AUTO: 0.05 K/UL (ref 0–0.04)
IMM GRANULOCYTES NFR BLD AUTO: 0.4 % (ref 0–0.5)
LYMPHOCYTES # BLD AUTO: 2.5 K/UL (ref 1–4.8)
LYMPHOCYTES NFR BLD: 17.9 % (ref 18–48)
MCH RBC QN AUTO: 28.4 PG (ref 27–31)
MCHC RBC AUTO-ENTMCNC: 31.7 G/DL (ref 32–36)
MCV RBC AUTO: 90 FL (ref 82–98)
MONOCYTES # BLD AUTO: 0.9 K/UL (ref 0.3–1)
MONOCYTES NFR BLD: 6.4 % (ref 4–15)
NEUTROPHILS # BLD AUTO: 10.3 K/UL (ref 1.8–7.7)
NEUTROPHILS NFR BLD: 72.7 % (ref 38–73)
NRBC BLD-RTO: 0 /100 WBC
PLATELET # BLD AUTO: 257 K/UL (ref 150–450)
PMV BLD AUTO: 11.7 FL (ref 9.2–12.9)
POTASSIUM SERPL-SCNC: 4 MMOL/L (ref 3.5–5.1)
PROT SERPL-MCNC: 8 G/DL (ref 6–8.4)
RBC # BLD AUTO: 4.57 M/UL (ref 4.6–6.2)
SODIUM SERPL-SCNC: 138 MMOL/L (ref 136–145)
WBC # BLD AUTO: 14.16 K/UL (ref 3.9–12.7)

## 2024-05-07 PROCEDURE — 84153 ASSAY OF PSA TOTAL: CPT | Performed by: FAMILY MEDICINE

## 2024-05-07 PROCEDURE — 99214 OFFICE O/P EST MOD 30 MIN: CPT | Mod: S$PBB,,, | Performed by: FAMILY MEDICINE

## 2024-05-07 PROCEDURE — 83036 HEMOGLOBIN GLYCOSYLATED A1C: CPT | Performed by: FAMILY MEDICINE

## 2024-05-07 PROCEDURE — 36415 COLL VENOUS BLD VENIPUNCTURE: CPT | Mod: PO | Performed by: FAMILY MEDICINE

## 2024-05-07 PROCEDURE — 85025 COMPLETE CBC W/AUTO DIFF WBC: CPT | Performed by: FAMILY MEDICINE

## 2024-05-07 PROCEDURE — 99999 PR PBB SHADOW E&M-EST. PATIENT-LVL III: CPT | Mod: PBBFAC,,, | Performed by: FAMILY MEDICINE

## 2024-05-07 PROCEDURE — 80053 COMPREHEN METABOLIC PANEL: CPT | Performed by: FAMILY MEDICINE

## 2024-05-07 PROCEDURE — 99213 OFFICE O/P EST LOW 20 MIN: CPT | Mod: PBBFAC,PO | Performed by: FAMILY MEDICINE

## 2024-05-07 NOTE — PROGRESS NOTES
Subjective:       Patient ID: Robert Munoz is a 59 y.o. male.    Chief Complaint: Follow-up and Foot Swelling      HPI Comments:       Current Outpatient Medications:     fluticasone propionate (FLONASE) 50 mcg/actuation nasal spray, 1 spray (50 mcg total) by Each Nostril route once daily., Disp: 16 g, Rfl: 3    nebivoloL (BYSTOLIC) 20 mg Tab, TAKE 1 TABLET BY MOUTH ONCE DAILY, Disp: 90 tablet, Rfl: 2    SUBOXONE 12-3 mg Film, Place 0.5 Film under the tongue 3 (three) times daily., Disp: , Rfl:     valsartan-hydrochlorothiazide (DIOVAN-HCT) 320-25 mg per tablet, TAKE 1 TABLET BY MOUTH ONCE DAILY., Disp: 90 tablet, Rfl: 1    albuterol (PROVENTIL/VENTOLIN HFA) 90 mcg/actuation inhaler, Inhale 2 puffs into the lungs every 4 (four) hours as needed for Wheezing., Disp: 18 g, Rfl: 2    furosemide (LASIX) 40 MG tablet, Take 1 tablet (40 mg total) by mouth daily as needed (for swelling). Take for 3 days, then as needed for swelling, Disp: 30 tablet, Rfl: 11  No current facility-administered medications for this visit.    Facility-Administered Medications Ordered in Other Visits:     ondansetron injection 4 mg, 4 mg, Intravenous, Once PRN, Dony Bean MD      This is our initial follow-up visit.  He establish care with me about 9 months ago.    He is lost 22 lb during the interval.  He is losing weight in order to have the podiatrist be able to work on his foot problems.    He also missed his blood work that I ordered last time.    He missed his vacation, his neurosurgery appointment, his dermatology appointments as well.  He would like to reschedule some of these and move everything to [facility]      Review of Systems   Constitutional:  Negative for activity change, appetite change and fever.   HENT:  Negative for sore throat.    Respiratory:  Negative for cough and shortness of breath.    Cardiovascular:  Positive for leg swelling. Negative for chest pain.   Gastrointestinal:  Negative for abdominal pain,  "diarrhea and nausea.   Genitourinary:  Negative for difficulty urinating.   Musculoskeletal:  Positive for back pain. Negative for arthralgias and myalgias.   Neurological:  Negative for dizziness and headaches.       Objective:      Vitals:    05/07/24 1054   BP: 118/60   Pulse: 84   Temp: 98.5 °F (36.9 °C)   TempSrc: Oral   SpO2: 96%   Weight: (!) 163.9 kg (361 lb 5.3 oz)   Height: 5' 11" (1.803 m)   PainSc:   6   PainLoc: Foot     Physical Exam  Vitals and nursing note reviewed.   Constitutional:       General: He is not in acute distress.     Appearance: He is well-developed. He is not diaphoretic.   HENT:      Head: Normocephalic.   Neck:      Thyroid: No thyromegaly.   Cardiovascular:      Rate and Rhythm: Normal rate and regular rhythm.      Heart sounds: Normal heart sounds. No murmur heard.  Pulmonary:      Effort: Pulmonary effort is normal.      Breath sounds: Normal breath sounds. No wheezing or rales.   Abdominal:      General: There is no distension.      Palpations: Abdomen is soft.   Musculoskeletal:      Cervical back: Neck supple.   Lymphadenopathy:      Cervical: No cervical adenopathy.   Skin:     General: Skin is warm and dry.   Neurological:      Mental Status: He is alert and oriented to person, place, and time.   Psychiatric:         Mood and Affect: Mood normal.         Behavior: Behavior normal.         Thought Content: Thought content normal.         Judgment: Judgment normal.         Assessment:       1. Primary hypertension    2. Morbid obesity with BMI of 50.0-59.9, adult    3. Sleep apnea, unspecified type    4. Abnormal finding of blood chemistry, unspecified    5. Encounter for screening for malignant neoplasm of prostate    6. Lumbar radiculopathy        Plan:   Primary hypertension  Comments:  Controlled.  Follow-up 6 months  Orders:  -     CBC Auto Differential; Future; Expected date: 05/07/2024  -     Comprehensive Metabolic Panel; Future; Expected date: 05/07/2024  -     " Hemoglobin A1C; Future; Expected date: 05/07/2024  -     PSA, Screening; Future; Expected date: 05/07/2024    Morbid obesity with BMI of 50.0-59.9, adult  Comments:  22 lb weight loss in the last 9 months.    Sleep apnea, unspecified type  Comments:  Rescheduled sleep evaluation    Abnormal finding of blood chemistry, unspecified  -     Hemoglobin A1C; Future; Expected date: 05/07/2024    Encounter for screening for malignant neoplasm of prostate  Comments:  PSA today  Orders:  -     PSA, Screening; Future; Expected date: 05/07/2024    Lumbar radiculopathy  Comments:  Follow-up neuro surgery

## 2024-05-19 DIAGNOSIS — R79.89 ELEVATED SERUM CREATININE: Primary | ICD-10-CM

## 2024-05-21 DIAGNOSIS — I10 HYPERTENSION NOT AT GOAL: Primary | ICD-10-CM

## 2024-05-21 DIAGNOSIS — M79.89 LEG SWELLING: ICD-10-CM

## 2024-05-29 DIAGNOSIS — R09.81 CHRONIC NASAL CONGESTION: ICD-10-CM

## 2024-05-29 RX ORDER — FLUTICASONE PROPIONATE 50 MCG
1 SPRAY, SUSPENSION (ML) NASAL DAILY
Qty: 48 G | Refills: 3 | Status: SHIPPED | OUTPATIENT
Start: 2024-05-29

## 2024-05-29 NOTE — TELEPHONE ENCOUNTER
Refill Decision Note   Robert Munoz  is requesting a refill authorization.  Brief Assessment and Rationale for Refill:  Approve     Medication Therapy Plan:         Comments:     Note composed:3:31 PM 05/29/2024

## 2024-05-29 NOTE — TELEPHONE ENCOUNTER
No care due was identified.  [facility] Embedded Care Due Messages. Reference number: [id number].   5/29/2024 1:50:19 PM CDT

## 2024-05-29 NOTE — TELEPHONE ENCOUNTER
No care due was identified.  Health [facility] Embedded Care Due Messages. Reference number: [id number].   5/29/2024 1:49:47 PM CDT

## 2024-05-31 RX ORDER — ALBUTEROL SULFATE 90 UG/1
2 AEROSOL, METERED RESPIRATORY (INHALATION) EVERY 4 HOURS PRN
Qty: 18 G | Refills: 2 | Status: SHIPPED | OUTPATIENT
Start: 2024-05-31 | End: 2025-05-31

## 2024-06-19 DIAGNOSIS — R09.81 CHRONIC NASAL CONGESTION: ICD-10-CM

## 2024-06-20 NOTE — TELEPHONE ENCOUNTER
No care due was identified.  [facility] Embedded Care Due Messages. Reference number: [id number].   6/19/2024 7:26:26 PM CDT

## 2024-06-21 RX ORDER — ALBUTEROL SULFATE 90 UG/1
2 AEROSOL, METERED RESPIRATORY (INHALATION) EVERY 4 HOURS PRN
Qty: 18 G | Refills: 2 | Status: SHIPPED | OUTPATIENT
Start: 2024-06-21 | End: 2025-06-21

## 2024-06-21 RX ORDER — FLUTICASONE PROPIONATE 50 MCG
1 SPRAY, SUSPENSION (ML) NASAL DAILY
Qty: 48 G | Refills: 3 | Status: SHIPPED | OUTPATIENT
Start: 2024-06-21

## 2024-06-27 NOTE — TELEPHONE ENCOUNTER
No care due was identified.  [facility] Embedded Care Due Messages. Reference number: [id number].   6/26/2024 8:19:51 PM CDT

## 2024-06-28 RX ORDER — NEBIVOLOL 20 MG/1
1 TABLET ORAL DAILY
Qty: 90 TABLET | Refills: 2 | Status: SHIPPED | OUTPATIENT
Start: 2024-06-28

## 2024-09-23 DIAGNOSIS — R09.81 CHRONIC NASAL CONGESTION: ICD-10-CM

## 2024-09-23 RX ORDER — FLUTICASONE PROPIONATE 50 MCG
1 SPRAY, SUSPENSION (ML) NASAL DAILY
Qty: 48 G | Refills: 2 | Status: SHIPPED | OUTPATIENT
Start: 2024-09-23

## 2024-09-23 NOTE — TELEPHONE ENCOUNTER
Refill Decision Note   Robert Munoz  is requesting a refill authorization.  Brief Assessment and Rationale for Refill:  Approve     Medication Therapy Plan:         Comments:     Note composed:4:07 PM 09/23/2024

## 2024-09-23 NOTE — TELEPHONE ENCOUNTER
No care due was identified.  [facility] Embedded Care Due Messages. Reference number: [id number].   9/23/2024 8:22:03 AM CDT

## 2024-09-30 ENCOUNTER — HOSPITAL ENCOUNTER (OUTPATIENT)
Dept: CARDIOLOGY | Facility: HOSPITAL | Age: 59
Discharge: HOME | End: 2024-09-30
Attending: STUDENT IN AN ORGANIZED HEALTH CARE EDUCATION/TRAINING PROGRAM
Payer: MEDICARE

## 2024-09-30 ENCOUNTER — OFFICE VISIT (OUTPATIENT)
Dept: CARDIOLOGY | Facility: CLINIC | Age: 59
End: 2024-09-30
Payer: MEDICARE

## 2024-09-30 VITALS
HEIGHT: 71 IN | DIASTOLIC BLOOD PRESSURE: 70 MMHG | BODY MASS INDEX: 44.1 KG/M2 | WEIGHT: 315 LBS | HEART RATE: 72 BPM | SYSTOLIC BLOOD PRESSURE: 132 MMHG

## 2024-09-30 DIAGNOSIS — I10 HYPERTENSION NOT AT GOAL: Primary | ICD-10-CM

## 2024-09-30 DIAGNOSIS — M79.89 LEG SWELLING: ICD-10-CM

## 2024-09-30 DIAGNOSIS — I10 HYPERTENSION, UNSPECIFIED TYPE: ICD-10-CM

## 2024-09-30 DIAGNOSIS — I10 HYPERTENSION NOT AT GOAL: ICD-10-CM

## 2024-09-30 DIAGNOSIS — M54.10 RADICULOPATHY, UNSPECIFIED SPINAL REGION: ICD-10-CM

## 2024-09-30 DIAGNOSIS — I87.2 VENOUS STASIS DERMATITIS, UNSPECIFIED LATERALITY: ICD-10-CM

## 2024-09-30 DIAGNOSIS — Z72.0 TOBACCO ABUSE: ICD-10-CM

## 2024-09-30 DIAGNOSIS — R06.02 SOB (SHORTNESS OF BREATH): ICD-10-CM

## 2024-09-30 DIAGNOSIS — R06.09 DOE (DYSPNEA ON EXERTION): ICD-10-CM

## 2024-09-30 DIAGNOSIS — E66.01 MORBID OBESITY WITH BMI OF 50.0-59.9, ADULT: ICD-10-CM

## 2024-09-30 LAB
OHS QRS DURATION: 102 MS
OHS QTC CALCULATION: 414 MS

## 2024-09-30 PROCEDURE — 93005 ELECTROCARDIOGRAM TRACING: CPT | Mod: HCNC

## 2024-09-30 PROCEDURE — 99214 OFFICE O/P EST MOD 30 MIN: CPT | Mod: HCNC,S$GLB,, | Performed by: STUDENT IN AN ORGANIZED HEALTH CARE EDUCATION/TRAINING PROGRAM

## 2024-09-30 PROCEDURE — 1159F MED LIST DOCD IN RCRD: CPT | Mod: HCNC,CPTII,S$GLB, | Performed by: STUDENT IN AN ORGANIZED HEALTH CARE EDUCATION/TRAINING PROGRAM

## 2024-09-30 PROCEDURE — 3075F SYST BP GE 130 - 139MM HG: CPT | Mod: HCNC,CPTII,S$GLB, | Performed by: STUDENT IN AN ORGANIZED HEALTH CARE EDUCATION/TRAINING PROGRAM

## 2024-09-30 PROCEDURE — 3044F HG A1C LEVEL LT 7.0%: CPT | Mod: HCNC,CPTII,S$GLB, | Performed by: STUDENT IN AN ORGANIZED HEALTH CARE EDUCATION/TRAINING PROGRAM

## 2024-09-30 PROCEDURE — 3008F BODY MASS INDEX DOCD: CPT | Mod: HCNC,CPTII,S$GLB, | Performed by: STUDENT IN AN ORGANIZED HEALTH CARE EDUCATION/TRAINING PROGRAM

## 2024-09-30 PROCEDURE — 99999 PR PBB SHADOW E&M-EST. PATIENT-LVL III: CPT | Mod: PBBFAC,HCNC,, | Performed by: STUDENT IN AN ORGANIZED HEALTH CARE EDUCATION/TRAINING PROGRAM

## 2024-09-30 PROCEDURE — 93010 ELECTROCARDIOGRAM REPORT: CPT | Mod: HCNC,,, | Performed by: INTERNAL MEDICINE

## 2024-09-30 PROCEDURE — 3078F DIAST BP <80 MM HG: CPT | Mod: HCNC,CPTII,S$GLB, | Performed by: STUDENT IN AN ORGANIZED HEALTH CARE EDUCATION/TRAINING PROGRAM

## 2024-09-30 RX ORDER — NEBIVOLOL 20 MG/1
1 TABLET ORAL DAILY
Qty: 90 TABLET | Refills: 1 | Status: SHIPPED | OUTPATIENT
Start: 2024-09-30

## 2024-09-30 RX ORDER — VALSARTAN AND HYDROCHLOROTHIAZIDE 320; 25 MG/1; MG/1
1 TABLET, FILM COATED ORAL DAILY
Qty: 90 TABLET | Refills: 1 | Status: SHIPPED | OUTPATIENT
Start: 2024-09-30 | End: 2025-09-30

## 2024-09-30 NOTE — PROGRESS NOTES
Section of Cardiology                  Cardiac Clinic Note    Chief Complaint/Reason for consultation: establish care       HPI:   Robert Munoz is a 59 y.o. male with h/o MALIA, tobacco ab use, HTN, obesity, chronic back pain who was rferred to cardiology clinic by PCP Dr. Guardado for evaluation.     2/20/23  Comes in to establish care  Knows he needs to lose weight  Does not exercise much   Gets a little SOB, but doesn't walk much due to back pain  If walks a long distance, gets SOB  Was placed on inhalers for possible asthma   Reports LE swelling in his feet, takes nifedipine   Drinks tea, trying to get reduce f sugar intake   Trying to drink more water   Since 10/22 has gained 17 lbs  Has significant swelling in legs, thought due to burning from detergent recently     Denies chest pain, syncope, palpitations, PND, orthopnea     Family history: dad- ETOH, heart attacks     Reports dipping tobacco, denies ETOH abuse (stopped long time ago)  Denies CVA, diabetes       4/24/23  Leg swelling significantly improved  Still having feet swelling   Sleeps on one pillow   Has back pain, got 2 shots in his back, has helped his pain   SOB, improved  Watching his salt intake   Trying to quit dipping tobacco   No chest, syncope, dizziness, palpitations       11/22/23  Doing well  Got a new car  LE swelling improved  Wears compression stockings  SOB has improved   BP stable today  Weight fluctuates, lost 5 lbs since 5/23- eating more salads, no fried foods   Watches salt intake   Takes lasix prn      9/30/24  Doing well  Having back pain  Having sinus issues- possible sinus infection   BP stable  LE swelling stable   Trying to quit dipping  Losing almost 20 lbs since last visit         EKG 9/30/24  NSR, 1st deg AVB   EKG 11/22/23 NSR, incomplete RBBB  EKG 2/20/23 ST, no acute ST - T wave changes    ECHO  2/23  The left ventricle is normal in size with concentric remodeling and normal systolic function.  Normal left  ventricular diastolic function.  The estimated PA systolic pressure is 24 mmHg.  Normal right ventricular size with normal right ventricular systolic function.  Intermediate central venous pressure (8 mmHg).  The estimated ejection fraction is 55%.    STRESS TEST    [facility]      ROS: All 10 systems reviewed. Please refer to the HPI for pertinent positives. All other systems negative.     Past Medical History  Past Medical History:   Diagnosis Date    Asthma     Hypertension     Sleep apnea        Surgical History  Past Surgical History:   Procedure Laterality Date    COLONOSCOPY N/A 10/13/2021    Procedure: COLONOSCOPY;  Surgeon: Eula Dominguez MD;  Location: [facility];  Service: Endoscopy;  Laterality: N/A;    ESOPHAGOGASTRODUODENOSCOPY N/A 10/27/2022    Procedure: EGD (ESOPHAGOGASTRODUODENOSCOPY);  Surgeon: Juliette Sawant MD;  Location: [facility];  Service: Endoscopy;  Laterality: N/A;    SELECTIVE INJECTION OF ANESTHETIC AGENT AROUND LUMBAR SPINAL NERVE ROOT BY TRANSFORAMINAL APPROACH Bilateral 3/16/2023    Procedure: Bilateral L4/5 TF QING;  Surgeon: Dony Bean MD;  Location: [facility];  Service: Pain Management;  Laterality: Bilateral;    SELECTIVE INJECTION OF ANESTHETIC AGENT AROUND LUMBAR SPINAL NERVE ROOT BY TRANSFORAMINAL APPROACH Bilateral 5/2/2023    Procedure: Bilateral L4/5 TF QING;  Surgeon: Dony Bean MD;  Location: [facility];  Service: Pain Management;  Laterality: Bilateral;          Allergies:   Review of patient's allergies indicates:  No Known Allergies    Social History:  Social History     Socioeconomic History    Marital status:    Tobacco Use    Smoking status: Never    Smokeless tobacco: Current     Types: Snuff    Tobacco comments:     1 can daily    Substance and Sexual Activity    Alcohol use: No    Drug use: No    Sexual activity: Yes     Social Drivers of Health     Financial Resource Strain: Medium Risk (11/17/2023)    Overall Financial Resource Strain  (CARDIA)     Difficulty of Paying Living Expenses: Somewhat hard   Food Insecurity: No Food Insecurity (11/17/2023)    Hunger Vital Sign     Worried About Running Out of Food in the Last Year: Never true     Ran Out of Food in the Last Year: Never true   Transportation Needs: Unmet Transportation Needs (11/17/2023)    PRAPARE - Transportation     Lack of Transportation (Medical): Yes     Lack of Transportation (Non-Medical): Yes   Physical Activity: Insufficiently Active (11/17/2023)    Exercise Vital Sign     Days of Exercise per Week: 2 days     Minutes of Exercise per Session: 10 min   Stress: No Stress Concern Present (11/17/2023)    [employer] [place] [employer] - Occupational Stress Questionnaire     Feeling of Stress : Not at all   Housing Stability: Unknown (11/17/2023)    Housing Stability Vital Sign     Unable to Pay for Housing in the Last Year: No     Unstable Housing in the Last Year: No       Family History:  family history includes Anuerysm in his mother; Diabetes in his mother; Heart disease in his father.    Home Medications:  Current Outpatient Medications on File Prior to Visit   Medication Sig Dispense Refill    albuterol (PROVENTIL/VENTOLIN HFA) 90 mcg/actuation inhaler Inhale 2 puffs into the lungs every 4 (four) hours as needed for Wheezing. 18 g 2    fluticasone propionate (FLONASE) 50 mcg/actuation nasal spray 1 spray (50 mcg total) by Each Nostril route once daily. 48 g 2    nebivoloL (BYSTOLIC) 20 mg Tab Take 1 tablet by mouth Daily. 90 tablet 2    SUBOXONE 12-3 mg Film Place 0.5 Film under the tongue 3 (three) times daily.      valsartan-hydrochlorothiazide (DIOVAN-HCT) 320-25 mg per tablet TAKE 1 TABLET BY MOUTH ONCE DAILY. 90 tablet 1    furosemide (LASIX) 40 MG tablet Take 1 tablet (40 mg total) by mouth daily as needed (for swelling). Take for 3 days, then as needed for swelling 30 tablet 11     Current Facility-Administered Medications on File Prior to Visit   Medication  "Dose Route Frequency Provider Last Rate Last Admin    ondansetron injection 4 mg  4 mg Intravenous Once PRN Dony Bean MD           Physical exam:  /70 (BP Location: Right arm, Patient Position: Sitting)   Pulse 72   Ht 5' 11" (1.803 m)   Wt (!) 164.9 kg (363 lb 10.4 oz)   BMI 50.72 kg/m²         General: Pt is a 59 y.o. year old male who is AAOx3, in NAD, is pleasant, well nourished, looks stated age  HEENT: PERRL, EOMI, Oral mucosa pink & moist  CVS  No abnormal cardiac pulsations noted on inspection. JVP not raised. The apical impulse is normal on palpation, and is located in the left 5th intercostal space in the mid - clavicular line. No palpable thrills or abnormal pulsations noted. RR, S1 - S2 heard, no murmurs, rubs or gallops appreciated.   PUL : CTA B/L.   ABD : BS +, soft. No tenderness elicited   LE : Venous dermastasis. 1+ edema  Distal Pulses palpable B/L         LABS:    Chemistry:   Lab Results   Component Value Date     05/07/2024    K 4.0 05/07/2024     05/07/2024    CO2 24 05/07/2024    BUN 32 (H) 05/07/2024    CREATININE 1.7 (H) 05/07/2024    CALCIUM 9.6 05/07/2024     Cardiac Markers: No results found for: "CKTOTAL", "CKMB", "CKMBINDEX", "TROPONINI"  Cardiac Markers (Last 3): No results found for: "CKTOTAL", "CKMB", "CKMBINDEX", "TROPONINI"  CBC:   Lab Results   Component Value Date    WBC 14.16 (H) 05/07/2024    HGB 13.0 (L) 05/07/2024    HCT 41.0 05/07/2024    MCV 90 05/07/2024     05/07/2024     Lipids:   Lab Results   Component Value Date    CHOL 119 (L) 06/03/2022    TRIG 93 06/03/2022    HDL 29 (L) 06/03/2022     Coagulation: No results found for: "PT", "INR", "APTT"    Assessment    1. Hypertension not at goal    2. Leg swelling    3. VIDAL (dyspnea on exertion)    4. Venous stasis dermatitis, unspecified laterality    5. Hypertension, unspecified type    6. Morbid obesity with BMI of 50.0-59.9, adult    7. Radiculopathy, unspecified spinal region    8. " Tobacco abuse    9. SOB (shortness of breath)            Plan:    VIDAL/LE swelling - improving   Continue bystolic 20 mg daily, Diovan-hctz  Continue compression stockings  Lasix p.r.n. for swelling    Tobacco abuse  Dipping cessation encouraged    Chronic back pain  Receiving prn spinal injections     HTN  Stable- digital medicine no longer working   Continue diovan-hctz, bystolic    Obesity, BMI 50-54.9  Low salt, low fat diet  Exercise as tolerated, at least 30 min daily        This note was prepared using voice recognition system and is likely to have sound alike errors that may have been overlooked even after proofreading.     I have reviewed all pertinent chart information.  Plans and recommendations have been formulated under my direct supervision. All questions answered and patient voiced understanding.   If symptoms persist go to the ED.    RTC in 6 months         Matty Hassan MD  Cardiology

## 2024-10-01 PROBLEM — F11.20 OPIOID USE DISORDER, SEVERE, ON MAINTENANCE THERAPY, DEPENDENCE: Status: ACTIVE | Noted: 2024-10-01

## 2024-10-02 ENCOUNTER — OFFICE VISIT (OUTPATIENT)
Dept: FAMILY MEDICINE | Facility: CLINIC | Age: 59
End: 2024-10-02
Payer: MEDICARE

## 2024-10-02 ENCOUNTER — LAB VISIT (OUTPATIENT)
Dept: LAB | Facility: HOSPITAL | Age: 59
End: 2024-10-02
Attending: FAMILY MEDICINE
Payer: MEDICARE

## 2024-10-02 ENCOUNTER — OFFICE VISIT (OUTPATIENT)
Dept: OTOLARYNGOLOGY | Facility: CLINIC | Age: 59
End: 2024-10-02
Payer: MEDICARE

## 2024-10-02 ENCOUNTER — OFFICE VISIT (OUTPATIENT)
Dept: DERMATOLOGY | Facility: CLINIC | Age: 59
End: 2024-10-02
Payer: MEDICARE

## 2024-10-02 VITALS — BODY MASS INDEX: 50 KG/M2 | HEIGHT: 71 IN

## 2024-10-02 VITALS
OXYGEN SATURATION: 94 % | HEIGHT: 71 IN | BODY MASS INDEX: 44.1 KG/M2 | DIASTOLIC BLOOD PRESSURE: 72 MMHG | HEART RATE: 88 BPM | WEIGHT: 315 LBS | SYSTOLIC BLOOD PRESSURE: 134 MMHG

## 2024-10-02 VITALS — WEIGHT: 315 LBS | BODY MASS INDEX: 44.1 KG/M2 | HEIGHT: 71 IN

## 2024-10-02 DIAGNOSIS — J31.0 RHINITIS MEDICAMENTOSA: Primary | ICD-10-CM

## 2024-10-02 DIAGNOSIS — R79.9 ABNORMAL FINDING OF BLOOD CHEMISTRY, UNSPECIFIED: ICD-10-CM

## 2024-10-02 DIAGNOSIS — G47.30 SLEEP APNEA, UNSPECIFIED TYPE: ICD-10-CM

## 2024-10-02 DIAGNOSIS — F11.20 OPIOID USE DISORDER, SEVERE, ON MAINTENANCE THERAPY, DEPENDENCE: Primary | ICD-10-CM

## 2024-10-02 DIAGNOSIS — I10 PRIMARY HYPERTENSION: ICD-10-CM

## 2024-10-02 DIAGNOSIS — R09.81 CHRONIC NASAL CONGESTION: ICD-10-CM

## 2024-10-02 DIAGNOSIS — E66.01 MORBID OBESITY WITH BMI OF 50.0-59.9, ADULT: ICD-10-CM

## 2024-10-02 DIAGNOSIS — R79.89 ELEVATED SERUM CREATININE: ICD-10-CM

## 2024-10-02 DIAGNOSIS — L72.9 SKIN CYST: ICD-10-CM

## 2024-10-02 DIAGNOSIS — T14.8XXA ABRASION: Primary | ICD-10-CM

## 2024-10-02 DIAGNOSIS — R22.9 SUBCUTANEOUS NODULE: ICD-10-CM

## 2024-10-02 DIAGNOSIS — T48.5X5A RHINITIS MEDICAMENTOSA: Primary | ICD-10-CM

## 2024-10-02 DIAGNOSIS — R73.03 PREDIABETES: ICD-10-CM

## 2024-10-02 LAB
ALBUMIN SERPL BCP-MCNC: 4 G/DL (ref 3.5–5.2)
ANION GAP SERPL CALC-SCNC: 10 MMOL/L (ref 8–16)
ANION GAP SERPL CALC-SCNC: 10 MMOL/L (ref 8–16)
BUN SERPL-MCNC: 19 MG/DL (ref 6–20)
BUN SERPL-MCNC: 19 MG/DL (ref 6–20)
CALCIUM SERPL-MCNC: 9.9 MG/DL (ref 8.7–10.5)
CALCIUM SERPL-MCNC: 9.9 MG/DL (ref 8.7–10.5)
CHLORIDE SERPL-SCNC: 103 MMOL/L (ref 95–110)
CHLORIDE SERPL-SCNC: 103 MMOL/L (ref 95–110)
CO2 SERPL-SCNC: 30 MMOL/L (ref 23–29)
CO2 SERPL-SCNC: 30 MMOL/L (ref 23–29)
CREAT SERPL-MCNC: 1.1 MG/DL (ref 0.5–1.4)
CREAT SERPL-MCNC: 1.1 MG/DL (ref 0.5–1.4)
EST. GFR  (NO RACE VARIABLE): >60 ML/MIN/1.73 M^2
EST. GFR  (NO RACE VARIABLE): >60 ML/MIN/1.73 M^2
ESTIMATED AVG GLUCOSE: 111 MG/DL (ref 68–131)
GLUCOSE SERPL-MCNC: 120 MG/DL (ref 70–110)
GLUCOSE SERPL-MCNC: 120 MG/DL (ref 70–110)
HBA1C MFR BLD: 5.5 % (ref 4–5.6)
PHOSPHATE SERPL-MCNC: 3.9 MG/DL (ref 2.7–4.5)
POTASSIUM SERPL-SCNC: 4.6 MMOL/L (ref 3.5–5.1)
POTASSIUM SERPL-SCNC: 4.6 MMOL/L (ref 3.5–5.1)
SODIUM SERPL-SCNC: 143 MMOL/L (ref 136–145)
SODIUM SERPL-SCNC: 143 MMOL/L (ref 136–145)

## 2024-10-02 PROCEDURE — 3008F BODY MASS INDEX DOCD: CPT | Mod: HCNC,CPTII,S$GLB, | Performed by: STUDENT IN AN ORGANIZED HEALTH CARE EDUCATION/TRAINING PROGRAM

## 2024-10-02 PROCEDURE — 1160F RVW MEDS BY RX/DR IN RCRD: CPT | Mod: HCNC,CPTII,S$GLB, | Performed by: STUDENT IN AN ORGANIZED HEALTH CARE EDUCATION/TRAINING PROGRAM

## 2024-10-02 PROCEDURE — 1159F MED LIST DOCD IN RCRD: CPT | Mod: HCNC,CPTII,S$GLB, | Performed by: STUDENT IN AN ORGANIZED HEALTH CARE EDUCATION/TRAINING PROGRAM

## 2024-10-02 PROCEDURE — G2211 COMPLEX E/M VISIT ADD ON: HCPCS | Mod: HCNC,S$GLB,, | Performed by: FAMILY MEDICINE

## 2024-10-02 PROCEDURE — 99999 PR PBB SHADOW E&M-EST. PATIENT-LVL III: CPT | Mod: PBBFAC,HCNC,, | Performed by: OTOLARYNGOLOGY

## 2024-10-02 PROCEDURE — 3044F HG A1C LEVEL LT 7.0%: CPT | Mod: HCNC,CPTII,S$GLB, | Performed by: FAMILY MEDICINE

## 2024-10-02 PROCEDURE — 99204 OFFICE O/P NEW MOD 45 MIN: CPT | Mod: HCNC,S$GLB,, | Performed by: STUDENT IN AN ORGANIZED HEALTH CARE EDUCATION/TRAINING PROGRAM

## 2024-10-02 PROCEDURE — 3078F DIAST BP <80 MM HG: CPT | Mod: HCNC,CPTII,S$GLB, | Performed by: FAMILY MEDICINE

## 2024-10-02 PROCEDURE — 3075F SYST BP GE 130 - 139MM HG: CPT | Mod: HCNC,CPTII,S$GLB, | Performed by: FAMILY MEDICINE

## 2024-10-02 PROCEDURE — 99999 PR PBB SHADOW E&M-EST. PATIENT-LVL III: CPT | Mod: PBBFAC,HCNC,, | Performed by: STUDENT IN AN ORGANIZED HEALTH CARE EDUCATION/TRAINING PROGRAM

## 2024-10-02 PROCEDURE — 3008F BODY MASS INDEX DOCD: CPT | Mod: HCNC,CPTII,S$GLB, | Performed by: OTOLARYNGOLOGY

## 2024-10-02 PROCEDURE — 80069 RENAL FUNCTION PANEL: CPT | Mod: HCNC | Performed by: FAMILY MEDICINE

## 2024-10-02 PROCEDURE — G2211 COMPLEX E/M VISIT ADD ON: HCPCS | Mod: HCNC,S$GLB,, | Performed by: STUDENT IN AN ORGANIZED HEALTH CARE EDUCATION/TRAINING PROGRAM

## 2024-10-02 PROCEDURE — 99999 PR PBB SHADOW E&M-EST. PATIENT-LVL III: CPT | Mod: PBBFAC,HCNC,, | Performed by: FAMILY MEDICINE

## 2024-10-02 PROCEDURE — 3044F HG A1C LEVEL LT 7.0%: CPT | Mod: HCNC,CPTII,S$GLB, | Performed by: STUDENT IN AN ORGANIZED HEALTH CARE EDUCATION/TRAINING PROGRAM

## 2024-10-02 PROCEDURE — 3044F HG A1C LEVEL LT 7.0%: CPT | Mod: HCNC,CPTII,S$GLB, | Performed by: OTOLARYNGOLOGY

## 2024-10-02 PROCEDURE — 36415 COLL VENOUS BLD VENIPUNCTURE: CPT | Mod: HCNC,PO | Performed by: FAMILY MEDICINE

## 2024-10-02 PROCEDURE — 83036 HEMOGLOBIN GLYCOSYLATED A1C: CPT | Mod: HCNC | Performed by: FAMILY MEDICINE

## 2024-10-02 PROCEDURE — 1159F MED LIST DOCD IN RCRD: CPT | Mod: HCNC,CPTII,S$GLB, | Performed by: OTOLARYNGOLOGY

## 2024-10-02 PROCEDURE — 99214 OFFICE O/P EST MOD 30 MIN: CPT | Mod: HCNC,S$GLB,, | Performed by: OTOLARYNGOLOGY

## 2024-10-02 PROCEDURE — 99214 OFFICE O/P EST MOD 30 MIN: CPT | Mod: HCNC,S$GLB,, | Performed by: FAMILY MEDICINE

## 2024-10-02 PROCEDURE — 3008F BODY MASS INDEX DOCD: CPT | Mod: HCNC,CPTII,S$GLB, | Performed by: FAMILY MEDICINE

## 2024-10-02 RX ORDER — METHYLPREDNISOLONE 4 MG/1
TABLET ORAL
Qty: 1 EACH | Refills: 0 | Status: SHIPPED | OUTPATIENT
Start: 2024-10-02

## 2024-10-02 RX ORDER — MUPIROCIN 20 MG/G
OINTMENT TOPICAL DAILY
Qty: 30 G | Refills: 1 | Status: SHIPPED | OUTPATIENT
Start: 2024-10-02

## 2024-10-02 RX ORDER — FLUTICASONE PROPIONATE 50 MCG
1 SPRAY, SUSPENSION (ML) NASAL DAILY
Qty: 48 G | Refills: 2 | Status: SHIPPED | OUTPATIENT
Start: 2024-10-02

## 2024-10-02 NOTE — PROGRESS NOTES
Subjective:       Patient ID: Robert Munoz is a 59 y.o. male.    Chief Complaint: Sinus Problem      HPI Comments:       Current Outpatient Medications:     albuterol (PROVENTIL/VENTOLIN HFA) 90 mcg/actuation inhaler, Inhale 2 puffs into the lungs every 4 (four) hours as needed for Wheezing., Disp: 18 g, Rfl: 2    fluticasone propionate (FLONASE) 50 mcg/actuation nasal spray, 1 spray (50 mcg total) by Each Nostril route once daily., Disp: 48 g, Rfl: 2    furosemide (LASIX) 40 MG tablet, Take 1 tablet (40 mg total) by mouth daily as needed (for swelling). Take for 3 days, then as needed for swelling, Disp: 30 tablet, Rfl: 11    nebivoloL (BYSTOLIC) 20 mg Tab, Take 1 tablet (20 mg total) by mouth Daily., Disp: 90 tablet, Rfl: 1    SUBOXONE 12-3 mg Film, Place 0.5 Film under the tongue 3 (three) times daily., Disp: , Rfl:     valsartan-hydrochlorothiazide (DIOVAN-HCT) 320-25 mg per tablet, Take 1 tablet by mouth once daily., Disp: 90 tablet, Rfl: 1  No current facility-administered medications for this visit.    Facility-Administered Medications Ordered in Other Visits:     ondansetron injection 4 mg, 4 mg, Intravenous, Once PRN, Dony Bean MD      5 month follow-up.  Having a lot of trouble with his sinuses and nose.  Still using decongestant spray too much.  Has been weaned off it once before.  Will send to ENT     He did not receive are message last time about his elevated creatinine at 1.7.  Says he does not drink enough water during the non summer months.  Will check it again today    No CPAP use.      Still on Suboxone.      Weight is stable    Labs last time creatinine 1.7, A1c 5.8, PSA 1.3    Sinus Problem  Associated symptoms include chills and coughing. Pertinent negatives include no ear pain, headaches, shortness of breath or sore throat.   Cough  This is a recurrent problem. The problem has been waxing and waning. The problem occurs every few hours. The cough is Productive of sputum. Associated  "symptoms include chills, nasal congestion, postnasal drip, rhinorrhea and wheezing. Pertinent negatives include no chest pain, ear congestion, ear pain, fever, headaches, heartburn, hemoptysis, myalgias, rash, sore throat, shortness of breath, sweats or weight loss. The symptoms are aggravated by dust and pollens. He has tried OTC cough suppressant, a beta-agonist inhaler, body position changes, cool air and oral steroids for the symptoms. The treatment provided moderate relief. His past medical history is significant for environmental allergies. There is no history of asthma, bronchiectasis, bronchitis, COPD, emphysema or pneumonia.     Review of Systems   Constitutional:  Positive for chills. Negative for fever and weight loss.   HENT:  Positive for postnasal drip and rhinorrhea. Negative for ear pain and sore throat.    Respiratory:  Positive for cough and wheezing. Negative for hemoptysis and shortness of breath.    Cardiovascular:  Negative for chest pain.   Gastrointestinal:  Negative for heartburn.   Musculoskeletal:  Negative for myalgias.   Skin:  Negative for rash.   Allergic/Immunologic: Positive for environmental allergies.   Neurological:  Negative for headaches.       Objective:      Vitals:    10/02/24 0948   BP: 134/72   Pulse: 88   SpO2: (!) 94%   Weight: (!) 162.6 kg (358 lb 7.5 oz)   Height: 5' 11" (1.803 m)   PainSc: 0-No pain     Physical Exam    Assessment:       1. Opioid use disorder, severe, on maintenance therapy, dependence    2. Morbid obesity with BMI of 50.0-59.9, adult    3. Primary hypertension    4. Sleep apnea, unspecified type    5. Elevated serum creatinine    6. Skin cyst    7. Chronic nasal congestion    8. Abnormal finding of blood chemistry, unspecified    9. Prediabetes        Plan:   Opioid use disorder, severe, on maintenance therapy, dependence  Comments:  On Suboxone    Morbid obesity with BMI of 50.0-59.9, adult  Comments:  No change in weight  Orders:  -     Hemoglobin " A1C; Future; Expected date: 10/02/2024    Primary hypertension  Comments:  Controlled.  Follow-up 6 months  Orders:  -     Basic Metabolic Panel; Future; Expected date: 10/02/2024    Sleep apnea, unspecified type  Comments:  No CPAP use    Elevated serum creatinine  Comments:  BMP today    Skin cyst  Comments:  Posterior neck.  Dermatology consult  Orders:  -     Ambulatory referral/consult to Dermatology; Future; Expected date: 10/09/2024    Chronic nasal congestion  Comments:  ENT consult.  Excessive use of over-the-counter topical decongestants  Orders:  -     Ambulatory referral/consult to ENT; Future; Expected date: 10/09/2024    Abnormal finding of blood chemistry, unspecified  -     Hemoglobin A1C; Future; Expected date: 10/02/2024    Prediabetes  Comments:  A1c today

## 2024-10-02 NOTE — PROGRESS NOTES
Subjective:       Patient ID:  Robert Munoz is a 59 y.o. male who presents for   Chief Complaint   Patient presents with    Cyst     History of Present Illness: The patient presents with chief complaint of growing cyst.  Location: back of the neck  Duration: year, but has become larger with time  Signs/Symptoms: growing round nodule under the skin, starting to become painful   Prior treatments: none    Patient also with edema on the lower legs, which has been better managed recently. But now with a healing cut on the left lower leg.     Cyst        Review of Systems   Constitutional:  Negative for fever and chills.   Skin:  Negative for itching, rash and dry skin.        Objective:    Physical Exam   Constitutional: He appears well-developed and well-nourished. No distress.   Neurological: He is alert and oriented to person, place, and time. He is not disoriented.   Psychiatric: He has a normal mood and affect.   Skin:   Areas Examined (abnormalities noted in diagram):   Head / Face Inspection Performed  Neck Inspection Performed  RUE Inspected  LUE Inspection Performed  RLE Inspected  LLE Inspection Performed                   Diagram Legend     Erythematous scaling macule/papule c/w actinic keratosis       Vascular papule c/w angioma      Pigmented verrucoid papule/plaque c/w seborrheic keratosis      Yellow umbilicated papule c/w sebaceous hyperplasia      Irregularly shaped tan macule c/w lentigo     1-2 mm smooth white papules consistent with Milia      Movable subcutaneous cyst with punctum c/w epidermal inclusion cyst      Subcutaneous movable cyst c/w pilar cyst      Firm pink to brown papule c/w dermatofibroma      Pedunculated fleshy papule(s) c/w skin tag(s)      Evenly pigmented macule c/w junctional nevus     Mildly variegated pigmented, slightly irregular-bordered macule c/w mildly atypical nevus      Flesh colored to evenly pigmented papule c/w intradermal nevus       Pink pearly papule/plaque c/w  basal cell carcinoma      Erythematous hyperkeratotic cursted plaque c/w SCC      Surgical scar with no sign of skin cancer recurrence      Open and closed comedones      Inflammatory papules and pustules      Verrucoid papule consistent consistent with wart     Erythematous eczematous patches and plaques     Dystrophic onycholytic nail with subungual debris c/w onychomycosis     Umbilicated papule    Erythematous-base heme-crusted tan verrucoid plaque consistent with inflamed seborrheic keratosis     Erythematous Silvery Scaling Plaque c/w Psoriasis     See annotation      Assessment / Plan:        Abrasion  -     mupirocin (BACTROBAN) 2 % ointment; Apply topically once daily.  Dispense: 30 g; Refill: 1  -     Wound care discussed.     Subcutaneous nodule  Reassurance given to patient.   Discussed treatment options - excision vs observation. Cysts may recur with excision. Given growth and location on the back of the neck, will schedule for removal.            Follow up in about 3 months (around 1/2/2025).

## 2024-10-02 NOTE — PATIENT INSTRUCTIONS
I had a long discussion with the patient regarding the diagnosis of rhinitis medicamentosa.  We discussed the physiologic response of the nasal mucosa to the OTC nasal sprays, and that rhinitis medicamentosa is a condition of rebound nasal congestion as a result of extended use of topical decongestants that constrict blood vessels in the lining of the nose.  The patient expressed understand, and is ready to try and stop the use of topical nasal decongestants.  We discussed different options including the use of oral steroids to prevent rebound nasal congestion as well as serially diluting the current bottle of nasal decongestant with saline over the next two weeks to gradually wean from the medicine.      Instructions on weaning off of nasal decongestant:    Week 1    Take your current bottle of topical nasal decongestant and remove the top   If it is full, pour out half and add saline until full  Continue to use nasal spray as needed    Week 2    Take the same bottle of topical nasal decongestant spray and repeat instructions for Week 1    *Repeat this process each week for 4 weeks and then discontinue nasal decongestant spray completely  *Use flonase and other prescription nasal sprays as directed by your physician  *Make sure that you are using a nasal spray bottle with a pump (pump spray mist) and not one that you have to squeeze the bottle itself

## 2024-10-24 ENCOUNTER — PROCEDURE VISIT (OUTPATIENT)
Dept: DERMATOLOGY | Facility: CLINIC | Age: 59
End: 2024-10-24
Payer: MEDICARE

## 2024-10-24 VITALS — BODY MASS INDEX: 44.1 KG/M2 | WEIGHT: 315 LBS | HEIGHT: 71 IN

## 2024-10-24 DIAGNOSIS — R22.9 SUBCUTANEOUS NODULE: Primary | ICD-10-CM

## 2024-10-24 NOTE — PATIENT INSTRUCTIONS
Post- Operative Wound Care    Your doctor has performed local skin surgery today.  Vaseline ointment and a pressure bandage were placed after the surgery.  It is very important that you keep this bandage in place for 24 hours.  This will decrease the risk of post - operative infection and bleeding.  After 24 hours, you may remove the band aid and wash the area with warm soap and water and apply Vaseline ointment.  Many patients prefer to use Neosporin or Bacitracin ointment.  This is acceptable; however know that you can develop an allergy to this medication even if you have used it safely for years.  It is important to keep the area moist.  Letting it dry out and get air slows healing time, will worsen the scar, and make it more difficult to remove the stitches.  Band aid is optional after first 24 hours.    It is best NOT to use hydrogen peroxide or antibacterial soap to wash the operative site.       If you notice increasing redness, tenderness, pain, or yellow drainage at the biopsy or surgical site, please notify your doctor.  These are signs of an infection.    If your biopsy/surgical site is bleeding, apply firm pressure for 15 minutes straight.  Repeat for another 15 minutes, if it is still bleeding.   If the surgical site continues to bleed, then please contact your doctor.      For [facility] users:   You will receive your pathology results in [facility] as soon as they are available. Please be assured that your physician/provider will review your results and contact you should additional treatment be required. This is one more way [employer] [facility] you [facility].

## 2024-10-24 NOTE — PROGRESS NOTES
PROCEDURE: Elliptical excision with intermediate layered repair    ANESTHETIC: 6 cc 1% Lidocaine with Epinephrine 1:100,000    SURGICAL PREP: Betadine and EtOH    SURGEON: Conrado Lindquist MD    ASSISTANTS:   Marcia Miranda MA    PREOPERATIVE DIAGNOSIS: Marcia Miranda MA    POSTOPERATIVE DIAGNOSIS: Subcutaneous nodule    PATHOLOGIC DIAGNOSIS: Pending    LOCATION: posterior neck    INITIAL LESION SIZE: 2 cm    EXCISED DIAMETER: 2 cm    PREPARATION:  The diagnosis, procedure, alternatives, benefits and risks, including but not limited to: drug reactions, pain, scar or cosmetic defect, local sensation disturbances, and/or recurrence of present condition were explained to the patient. The patient elected to proceed.    PROCEDURE:  The area of the posterior neck was prepped, draped, and anesthetized in the usual sterile fashion. Lesional tissue was carefully marked prior to administration of the anesthesia. An elliptical excision was drawn around the lesion. A fusiform elliptical excision was done with a #15 blade carried down completely through the dermis into the subcutaneous tissues. Then, with a combination of blunt and sharp dissection, the lesion was removed.  The specimen was submitted for histologic evaluation. The operative site was widely undermined in the subcutaneous tissue plane. Blood loss was minimal. The wound was then approximated in a layered fashion with subcutaneous and intradermal 3-0 Vicryl sutures. The wound was then superficially closed with interrupted 3-0 Ethilon sutures.    The patient tolerated the procedure well.    The area was cleaned and dressed appropriately and the patient was given wound care instructions, as well as an appointment for follow-up evaluation.    LENGTH OF REPAIR: 2 cm      Follow up in about 1 year (around 10/24/2025).

## 2024-11-01 ENCOUNTER — CLINICAL SUPPORT (OUTPATIENT)
Dept: DERMATOLOGY | Facility: CLINIC | Age: 59
End: 2024-11-01
Payer: MEDICARE

## 2024-11-01 DIAGNOSIS — Z48.02 VISIT FOR SUTURE REMOVAL: Primary | ICD-10-CM

## 2024-11-01 PROCEDURE — 99999 PR PBB SHADOW E&M-EST. PATIENT-LVL I: CPT | Mod: PBBFAC,HCNC,,

## 2024-11-19 ENCOUNTER — OFFICE VISIT (OUTPATIENT)
Dept: FAMILY MEDICINE | Facility: CLINIC | Age: 59
End: 2024-11-19
Payer: MEDICARE

## 2024-11-19 VITALS
BODY MASS INDEX: 44.1 KG/M2 | HEART RATE: 72 BPM | DIASTOLIC BLOOD PRESSURE: 70 MMHG | TEMPERATURE: 98 F | OXYGEN SATURATION: 97 % | HEIGHT: 71 IN | SYSTOLIC BLOOD PRESSURE: 136 MMHG | WEIGHT: 315 LBS

## 2024-11-19 DIAGNOSIS — G47.30 SLEEP APNEA, UNSPECIFIED TYPE: ICD-10-CM

## 2024-11-19 DIAGNOSIS — Z23 NEED FOR VACCINATION: ICD-10-CM

## 2024-11-19 DIAGNOSIS — M79.671 HEEL PAIN, BILATERAL: ICD-10-CM

## 2024-11-19 DIAGNOSIS — I10 PRIMARY HYPERTENSION: ICD-10-CM

## 2024-11-19 DIAGNOSIS — M79.672 HEEL PAIN, BILATERAL: ICD-10-CM

## 2024-11-19 DIAGNOSIS — R73.03 PREDIABETES: ICD-10-CM

## 2024-11-19 DIAGNOSIS — E66.01 MORBID OBESITY WITH BMI OF 50.0-59.9, ADULT: Primary | ICD-10-CM

## 2024-11-19 PROCEDURE — 1159F MED LIST DOCD IN RCRD: CPT | Mod: HCNC,CPTII,S$GLB, | Performed by: FAMILY MEDICINE

## 2024-11-19 PROCEDURE — 90656 IIV3 VACC NO PRSV 0.5 ML IM: CPT | Mod: HCNC,S$GLB,, | Performed by: FAMILY MEDICINE

## 2024-11-19 PROCEDURE — 3075F SYST BP GE 130 - 139MM HG: CPT | Mod: HCNC,CPTII,S$GLB, | Performed by: FAMILY MEDICINE

## 2024-11-19 PROCEDURE — 3044F HG A1C LEVEL LT 7.0%: CPT | Mod: HCNC,CPTII,S$GLB, | Performed by: FAMILY MEDICINE

## 2024-11-19 PROCEDURE — 3008F BODY MASS INDEX DOCD: CPT | Mod: HCNC,CPTII,S$GLB, | Performed by: FAMILY MEDICINE

## 2024-11-19 PROCEDURE — 99999 PR PBB SHADOW E&M-EST. PATIENT-LVL IV: CPT | Mod: PBBFAC,HCNC,, | Performed by: FAMILY MEDICINE

## 2024-11-19 PROCEDURE — 3078F DIAST BP <80 MM HG: CPT | Mod: HCNC,CPTII,S$GLB, | Performed by: FAMILY MEDICINE

## 2024-11-19 PROCEDURE — G0008 ADMIN INFLUENZA VIRUS VAC: HCPCS | Mod: HCNC,S$GLB,, | Performed by: FAMILY MEDICINE

## 2024-11-19 PROCEDURE — G2211 COMPLEX E/M VISIT ADD ON: HCPCS | Mod: HCNC,S$GLB,, | Performed by: FAMILY MEDICINE

## 2024-11-19 PROCEDURE — 99214 OFFICE O/P EST MOD 30 MIN: CPT | Mod: HCNC,S$GLB,, | Performed by: FAMILY MEDICINE

## 2024-11-19 NOTE — PROGRESS NOTES
Subjective:       Patient ID: Robert Munoz is a 59 y.o. male.    Chief Complaint: Influenza, Medication Refill, and Annual Exam      HPI Comments:       Current Outpatient Medications:     albuterol (PROVENTIL/VENTOLIN HFA) 90 mcg/actuation inhaler, Inhale 2 puffs into the lungs every 4 (four) hours as needed for Wheezing., Disp: 18 g, Rfl: 2    fluticasone propionate (FLONASE) 50 mcg/actuation nasal spray, 1 spray (50 mcg total) by Each Nostril route once daily., Disp: 48 g, Rfl: 2    methylPREDNISolone (MEDROL DOSEPACK) 4 mg tablet, use as directed, Disp: 1 each, Rfl: 0    mupirocin (BACTROBAN) 2 % ointment, Apply topically once daily., Disp: 30 g, Rfl: 1    nebivoloL (BYSTOLIC) 20 mg Tab, Take 1 tablet (20 mg total) by mouth Daily., Disp: 90 tablet, Rfl: 1    SUBOXONE 12-3 mg Film, Place 0.5 Film under the tongue 3 (three) times daily., Disp: , Rfl:     valsartan-hydrochlorothiazide (DIOVAN-HCT) 320-25 mg per tablet, Take 1 tablet by mouth once daily., Disp: 90 tablet, Rfl: 1    furosemide (LASIX) 40 MG tablet, Take 1 tablet (40 mg total) by mouth daily as needed (for swelling). Take for 3 days, then as needed for swelling, Disp: 30 tablet, Rfl: 11  No current facility-administered medications for this visit.    Facility-Administered Medications Ordered in Other Visits:     ondansetron injection 4 mg, 4 mg, Intravenous, Once PRN, Dony Bean MD      Six week follow-up.  Has pain in both heels again.  In the past when he saw Podiatry there was concerned about the swelling he was having in his legs.  This seems have improved after a visit with Cardiology.  Has a hard time walking.      On Suboxone.  Doing very well.  Lots of energy.    Repeat in his creatinine last time and was improved to 1.1    Has a large mass removed by Dermatology which was benign    Influenza  Pertinent negatives include no arthralgias, chest pain, headaches, joint swelling, neck pain, vomiting or weakness.   Medication  "Refill  Pertinent negatives include no arthralgias, chest pain, headaches, joint swelling, neck pain, vomiting or weakness.     Review of Systems   Constitutional:  Negative for activity change and unexpected weight change.   HENT:  Negative for hearing loss, rhinorrhea and trouble swallowing.    Eyes:  Negative for discharge and visual disturbance.   Respiratory:  Negative for chest tightness and wheezing.    Cardiovascular:  Negative for chest pain and palpitations.   Gastrointestinal:  Negative for blood in stool, constipation, diarrhea and vomiting.   Endocrine: Negative for polydipsia and polyuria.   Genitourinary:  Negative for difficulty urinating, hematuria and urgency.   Musculoskeletal:  Negative for arthralgias, joint swelling and neck pain.   Neurological:  Negative for weakness and headaches.   Psychiatric/Behavioral:  Negative for confusion and dysphoric mood.        Objective:      Vitals:    11/19/24 0946   BP: 136/70   Pulse: 72   Temp: 97.9 °F (36.6 °C)   TempSrc: Tympanic   SpO2: 97%   Weight: (!) 160.6 kg (354 lb 2.7 oz)   Height: 5' 11" (1.803 m)   PainSc: 0-No pain     Physical Exam  Vitals and nursing note reviewed.   Constitutional:       General: He is not in acute distress.     Appearance: He is well-developed. He is obese. He is not diaphoretic.   HENT:      Head: Normocephalic.      Mouth/Throat:      Pharynx: No oropharyngeal exudate.   Neck:      Thyroid: No thyromegaly.   Cardiovascular:      Rate and Rhythm: Normal rate and regular rhythm.      Heart sounds: Normal heart sounds. No murmur heard.  Pulmonary:      Effort: Pulmonary effort is normal.      Breath sounds: Normal breath sounds. No wheezing or rales.   Abdominal:      General: There is no distension.      Palpations: Abdomen is soft.   Musculoskeletal:      Cervical back: Neck supple.      Right lower leg: No edema.      Left lower leg: No edema.   Lymphadenopathy:      Cervical: No cervical adenopathy.   Skin:     General: " Skin is warm and dry.   Neurological:      Mental Status: He is alert and oriented to person, place, and time.   Psychiatric:         Mood and Affect: Mood normal.         Behavior: Behavior normal.         Thought Content: Thought content normal.         Judgment: Judgment normal.         Assessment:       1. Morbid obesity with BMI of 50.0-59.9, adult    2. Primary hypertension    3. Sleep apnea, unspecified type    4. Prediabetes    5. Heel pain, bilateral        Plan:   Morbid obesity with BMI of 50.0-59.9, adult  Comments:  Weight unchanged    Primary hypertension  Comments:  Controlled.  Follow-up six-month    Sleep apnea, unspecified type  Comments:  No CPAP use    Prediabetes  Comments:  A1c 5.5    Heel pain, bilateral  Comments:  Podiatry follow-up  Orders:  -     Ambulatory referral/consult to Podiatry; Future; Expected date: 11/26/2024

## 2025-01-14 DIAGNOSIS — Z00.00 ENCOUNTER FOR MEDICARE ANNUAL WELLNESS EXAM: ICD-10-CM

## 2025-01-21 ENCOUNTER — PATIENT MESSAGE (OUTPATIENT)
Dept: PODIATRY | Facility: CLINIC | Age: 60
End: 2025-01-21
Payer: MEDICARE

## 2025-03-26 PROBLEM — R73.03 PREDIABETES: Status: ACTIVE | Noted: 2025-03-26

## 2025-03-26 PROBLEM — Z12.11 ENCOUNTER FOR SCREENING COLONOSCOPY: Status: RESOLVED | Noted: 2021-10-12 | Resolved: 2025-03-26

## 2025-03-26 PROBLEM — Z01.811 ENCOUNTER FOR PRE-OPERATIVE RESPIRATORY CLEARANCE: Status: RESOLVED | Noted: 2023-03-09 | Resolved: 2025-03-26

## 2025-03-26 PROBLEM — I70.0 AORTIC ATHEROSCLEROSIS: Status: ACTIVE | Noted: 2025-03-26

## 2025-03-31 ENCOUNTER — OFFICE VISIT (OUTPATIENT)
Dept: FAMILY MEDICINE | Facility: CLINIC | Age: 60
End: 2025-03-31
Payer: MEDICARE

## 2025-03-31 VITALS
HEART RATE: 81 BPM | HEIGHT: 71 IN | WEIGHT: 315 LBS | TEMPERATURE: 98 F | RESPIRATION RATE: 16 BRPM | BODY MASS INDEX: 44.1 KG/M2 | DIASTOLIC BLOOD PRESSURE: 70 MMHG | SYSTOLIC BLOOD PRESSURE: 120 MMHG | OXYGEN SATURATION: 94 %

## 2025-03-31 DIAGNOSIS — E66.01 MORBID OBESITY WITH BMI OF 50.0-59.9, ADULT: ICD-10-CM

## 2025-03-31 DIAGNOSIS — J30.1 SEASONAL ALLERGIC RHINITIS DUE TO POLLEN: Primary | ICD-10-CM

## 2025-03-31 DIAGNOSIS — R73.03 PREDIABETES: ICD-10-CM

## 2025-03-31 DIAGNOSIS — F11.20 OPIOID USE DISORDER, SEVERE, ON MAINTENANCE THERAPY, DEPENDENCE: ICD-10-CM

## 2025-03-31 DIAGNOSIS — I10 PRIMARY HYPERTENSION: ICD-10-CM

## 2025-03-31 PROCEDURE — 3078F DIAST BP <80 MM HG: CPT | Mod: HCNC,CPTII,S$GLB, | Performed by: FAMILY MEDICINE

## 2025-03-31 PROCEDURE — 3074F SYST BP LT 130 MM HG: CPT | Mod: HCNC,CPTII,S$GLB, | Performed by: FAMILY MEDICINE

## 2025-03-31 PROCEDURE — G2211 COMPLEX E/M VISIT ADD ON: HCPCS | Mod: HCNC,S$GLB,, | Performed by: FAMILY MEDICINE

## 2025-03-31 PROCEDURE — 99214 OFFICE O/P EST MOD 30 MIN: CPT | Mod: HCNC,S$GLB,, | Performed by: FAMILY MEDICINE

## 2025-03-31 PROCEDURE — 99999 PR PBB SHADOW E&M-EST. PATIENT-LVL III: CPT | Mod: PBBFAC,HCNC,, | Performed by: FAMILY MEDICINE

## 2025-03-31 PROCEDURE — 1159F MED LIST DOCD IN RCRD: CPT | Mod: HCNC,CPTII,S$GLB, | Performed by: FAMILY MEDICINE

## 2025-03-31 PROCEDURE — 3008F BODY MASS INDEX DOCD: CPT | Mod: HCNC,CPTII,S$GLB, | Performed by: FAMILY MEDICINE

## 2025-03-31 RX ORDER — FLUTICASONE PROPIONATE 50 MCG
2 SPRAY, SUSPENSION (ML) NASAL DAILY
Qty: 48 G | Refills: 2 | Status: SHIPPED | OUTPATIENT
Start: 2025-03-31

## 2025-03-31 RX ORDER — CELECOXIB 200 MG/1
200 CAPSULE ORAL 2 TIMES DAILY
COMMUNITY
Start: 2025-03-24

## 2025-03-31 RX ORDER — LEVOCETIRIZINE DIHYDROCHLORIDE 5 MG/1
5 TABLET, FILM COATED ORAL NIGHTLY
Qty: 30 TABLET | Refills: 1 | Status: SHIPPED | OUTPATIENT
Start: 2025-03-31 | End: 2026-03-31

## 2025-03-31 RX ORDER — AZELASTINE 1 MG/ML
1 SPRAY, METERED NASAL 2 TIMES DAILY
Qty: 30 ML | Refills: 0 | Status: SHIPPED | OUTPATIENT
Start: 2025-03-31 | End: 2026-03-31

## 2025-03-31 RX ORDER — METHYLPREDNISOLONE 4 MG/1
TABLET ORAL
Qty: 1 EACH | Refills: 0 | Status: SHIPPED | OUTPATIENT
Start: 2025-03-31

## 2025-03-31 NOTE — PROGRESS NOTES
"Subjective:       Patient ID: Robert Munoz is a 59 y.o. male.    Chief Complaint: Sinus Problem      HPI Comments:     Current Medications[1]      Last seen by me 4 months ago.      Complains of 1-2 weeks who allergic rhinitis symptoms.  Sneezing, congestion.  Started using over-the-counter topical decongestants again.  Knows that he should stop this right away.  Using Flonase but out of Astelin spray    On Chantix from his Suboxone doctor.  Trying to get off the dip      Review of Systems   Constitutional:  Negative for activity change and unexpected weight change.   HENT:  Positive for congestion, rhinorrhea and sneezing. Negative for hearing loss and trouble swallowing.    Eyes:  Negative for discharge and visual disturbance.   Respiratory:  Positive for wheezing. Negative for chest tightness.    Cardiovascular:  Negative for chest pain and palpitations.   Gastrointestinal:  Negative for blood in stool, constipation, diarrhea and vomiting.   Endocrine: Negative for polydipsia and polyuria.   Genitourinary:  Negative for difficulty urinating, hematuria and urgency.   Musculoskeletal:  Positive for arthralgias. Negative for joint swelling.   Neurological:  Negative for weakness.   Psychiatric/Behavioral:  Negative for confusion and dysphoric mood.        Objective:      Vitals:    03/31/25 1257   BP: 120/70   Pulse: 81   Resp: 16   Temp: 97.7 °F (36.5 °C)   TempSrc: Tympanic   SpO2: (!) 94%   Weight: (!) 163.3 kg (360 lb 1.9 oz)   Height: 5' 11" (1.803 m)   PainSc:   3   PainLoc: Back     Physical Exam  Vitals and nursing note reviewed.   Constitutional:       General: He is not in acute distress.     Appearance: He is well-developed. He is not diaphoretic.   HENT:      Head: Normocephalic.      Nose: Mucosal edema and rhinorrhea present.      Mouth/Throat:      Pharynx: Postnasal drip present. No oropharyngeal exudate or posterior oropharyngeal erythema.   Neck:      Thyroid: No thyromegaly.   Cardiovascular:     "  Rate and Rhythm: Normal rate and regular rhythm.      Heart sounds: Normal heart sounds. No murmur heard.  Pulmonary:      Effort: Pulmonary effort is normal.      Breath sounds: Normal breath sounds. No wheezing or rales.   Abdominal:      General: There is no distension.      Palpations: Abdomen is soft.   Musculoskeletal:      Cervical back: Neck supple.   Lymphadenopathy:      Cervical: No cervical adenopathy.   Skin:     General: Skin is warm and dry.   Neurological:      Mental Status: He is alert and oriented to person, place, and time.   Psychiatric:         Behavior: Behavior normal.         Thought Content: Thought content normal.         Judgment: Judgment normal.         Assessment:       1. Seasonal allergic rhinitis due to pollen    2. Morbid obesity with BMI of 50.0-59.9, adult    3. Opioid use disorder, severe, on maintenance therapy, dependence    4. Primary hypertension    5. Prediabetes        Plan:   Seasonal allergic rhinitis due to pollen  Comments:  Stop nasal decongestant sprays!  Medrol Dosepak, continue Flonase and Astelin.  Xyzal daily  Orders:  -     fluticasone propionate (FLONASE) 50 mcg/actuation nasal spray; 2 sprays (100 mcg total) by Each Nostril route once daily.  Dispense: 48 g; Refill: 2  -     azelastine (ASTELIN) 137 mcg (0.1 %) nasal spray; 1 spray (137 mcg total) by Nasal route 2 (two) times daily.  Dispense: 30 mL; Refill: 0    Morbid obesity with BMI of 50.0-59.9, adult  Comments:  Weight up a few lb    Opioid use disorder, severe, on maintenance therapy, dependence  Comments:  On Suboxone    Primary hypertension  Comments:  Controlled.  Continue Bystolic    Prediabetes  Comments:  A1c 5.5.  Follow-up 2-3 months    Other orders  -     levocetirizine (XYZAL) 5 MG tablet; Take 1 tablet (5 mg total) by mouth every evening.  Dispense: 30 tablet; Refill: 1  -     methylPREDNISolone (MEDROL DOSEPACK) 4 mg tablet; use as directed  Dispense: 1 each; Refill: 0                 [1]    Current Outpatient Medications:     albuterol (PROVENTIL/VENTOLIN HFA) 90 mcg/actuation inhaler, Inhale 2 puffs into the lungs every 4 (four) hours as needed for Wheezing., Disp: 18 g, Rfl: 2    celecoxib (CELEBREX) 200 MG capsule, Take 200 mg by mouth 2 (two) times daily., Disp: , Rfl:     methylPREDNISolone (MEDROL DOSEPACK) 4 mg tablet, use as directed, Disp: 1 each, Rfl: 0    mupirocin (BACTROBAN) 2 % ointment, Apply topically once daily., Disp: 30 g, Rfl: 1    nebivoloL (BYSTOLIC) 20 mg Tab, Take 1 tablet (20 mg total) by mouth Daily., Disp: 90 tablet, Rfl: 1    SUBOXONE 12-3 mg Film, Place 0.5 Film under the tongue 3 (three) times daily., Disp: , Rfl:     valsartan-hydrochlorothiazide (DIOVAN-HCT) 320-25 mg per tablet, Take 1 tablet by mouth once daily., Disp: 90 tablet, Rfl: 1    azelastine (ASTELIN) 137 mcg (0.1 %) nasal spray, 1 spray (137 mcg total) by Nasal route 2 (two) times daily., Disp: 30 mL, Rfl: 0    fluticasone propionate (FLONASE) 50 mcg/actuation nasal spray, 2 sprays (100 mcg total) by Each Nostril route once daily., Disp: 48 g, Rfl: 2    furosemide (LASIX) 40 MG tablet, Take 1 tablet (40 mg total) by mouth daily as needed (for swelling). Take for 3 days, then as needed for swelling, Disp: 30 tablet, Rfl: 11    levocetirizine (XYZAL) 5 MG tablet, Take 1 tablet (5 mg total) by mouth every evening., Disp: 30 tablet, Rfl: 1    methylPREDNISolone (MEDROL DOSEPACK) 4 mg tablet, use as directed, Disp: 1 each, Rfl: 0  No current facility-administered medications for this visit.    Facility-Administered Medications Ordered in Other Visits:     ondansetron injection 4 mg, 4 mg, Intravenous, Once PRN, Dony Bean MD

## 2025-04-01 RX ORDER — VALSARTAN AND HYDROCHLOROTHIAZIDE 320; 25 MG/1; MG/1
1 TABLET, FILM COATED ORAL DAILY
Qty: 90 TABLET | Refills: 3 | Status: SHIPPED | OUTPATIENT
Start: 2025-04-01 | End: 2026-04-01

## 2025-08-03 DIAGNOSIS — J30.1 SEASONAL ALLERGIC RHINITIS DUE TO POLLEN: ICD-10-CM

## 2025-08-03 NOTE — TELEPHONE ENCOUNTER
No care due was identified.  [facility] Embedded Care Due Messages. Reference number: [id number].   8/03/2025 8:52:52 AM CDT

## 2025-08-04 RX ORDER — VALSARTAN AND HYDROCHLOROTHIAZIDE 320; 25 MG/1; MG/1
1 TABLET, FILM COATED ORAL DAILY
Qty: 90 TABLET | Refills: 3 | Status: SHIPPED | OUTPATIENT
Start: 2025-08-04 | End: 2025-08-06 | Stop reason: SDUPTHER

## 2025-08-04 RX ORDER — FLUTICASONE PROPIONATE 50 MCG
2 SPRAY, SUSPENSION (ML) NASAL DAILY
Qty: 48 G | Refills: 2 | Status: SHIPPED | OUTPATIENT
Start: 2025-08-04

## 2025-08-04 NOTE — TELEPHONE ENCOUNTER
Refill Decision Note   Robert Munoz  is requesting a refill authorization.  Brief Assessment and Rationale for Refill:  Approve     Medication Therapy Plan:        Comments:     Note composed:3:14 PM 08/04/2025

## 2025-08-05 ENCOUNTER — PATIENT MESSAGE (OUTPATIENT)
Dept: FAMILY MEDICINE | Facility: CLINIC | Age: 60
End: 2025-08-05
Payer: MEDICARE

## 2025-08-06 ENCOUNTER — LAB VISIT (OUTPATIENT)
Dept: LAB | Facility: HOSPITAL | Age: 60
End: 2025-08-06
Attending: FAMILY MEDICINE
Payer: MEDICARE

## 2025-08-06 ENCOUNTER — OFFICE VISIT (OUTPATIENT)
Dept: FAMILY MEDICINE | Facility: CLINIC | Age: 60
End: 2025-08-06
Payer: MEDICARE

## 2025-08-06 VITALS
BODY MASS INDEX: 44.1 KG/M2 | OXYGEN SATURATION: 98 % | HEART RATE: 81 BPM | WEIGHT: 315 LBS | DIASTOLIC BLOOD PRESSURE: 70 MMHG | TEMPERATURE: 98 F | SYSTOLIC BLOOD PRESSURE: 130 MMHG | HEIGHT: 71 IN

## 2025-08-06 DIAGNOSIS — G47.30 SLEEP APNEA, UNSPECIFIED TYPE: ICD-10-CM

## 2025-08-06 DIAGNOSIS — Z72.0 TOBACCO ABUSE: ICD-10-CM

## 2025-08-06 DIAGNOSIS — E66.01 MORBID OBESITY WITH BMI OF 50.0-59.9, ADULT: ICD-10-CM

## 2025-08-06 DIAGNOSIS — F11.20 OPIOID USE DISORDER, SEVERE, ON MAINTENANCE THERAPY, DEPENDENCE: ICD-10-CM

## 2025-08-06 DIAGNOSIS — Z12.5 ENCOUNTER FOR SCREENING FOR MALIGNANT NEOPLASM OF PROSTATE: ICD-10-CM

## 2025-08-06 DIAGNOSIS — R73.03 PREDIABETES: ICD-10-CM

## 2025-08-06 DIAGNOSIS — Z00.00 ANNUAL PHYSICAL EXAM: Primary | ICD-10-CM

## 2025-08-06 DIAGNOSIS — I10 PRIMARY HYPERTENSION: ICD-10-CM

## 2025-08-06 DIAGNOSIS — Z00.00 ANNUAL PHYSICAL EXAM: ICD-10-CM

## 2025-08-06 LAB
ALBUMIN SERPL BCP-MCNC: 3.9 G/DL (ref 3.5–5.2)
ALP SERPL-CCNC: 91 UNIT/L (ref 40–150)
ALT SERPL W/O P-5'-P-CCNC: 24 UNIT/L (ref 0–55)
ANION GAP (OHS): 10 MMOL/L (ref 8–16)
AST SERPL-CCNC: 29 UNIT/L (ref 0–50)
BILIRUB SERPL-MCNC: 0.4 MG/DL (ref 0.1–1)
BUN SERPL-MCNC: 26 MG/DL (ref 8–23)
CALCIUM SERPL-MCNC: 9.1 MG/DL (ref 8.7–10.5)
CHLORIDE SERPL-SCNC: 102 MMOL/L (ref 95–110)
CHOLEST SERPL-MCNC: 124 MG/DL (ref 120–199)
CHOLEST/HDLC SERPL: 3.9 {RATIO} (ref 2–5)
CO2 SERPL-SCNC: 24 MMOL/L (ref 23–29)
CREAT SERPL-MCNC: 1.1 MG/DL (ref 0.5–1.4)
EAG (OHS): 114 MG/DL (ref 68–131)
GFR SERPLBLD CREATININE-BSD FMLA CKD-EPI: >60 ML/MIN/1.73/M2
GLUCOSE SERPL-MCNC: 79 MG/DL (ref 70–110)
HBA1C MFR BLD: 5.6 % (ref 4–5.6)
HDLC SERPL-MCNC: 32 MG/DL (ref 40–75)
HDLC SERPL: 25.8 % (ref 20–50)
LDLC SERPL CALC-MCNC: 78.8 MG/DL (ref 63–159)
NONHDLC SERPL-MCNC: 92 MG/DL
POTASSIUM SERPL-SCNC: 4.1 MMOL/L (ref 3.5–5.1)
PROT SERPL-MCNC: 7.6 GM/DL (ref 6–8.4)
PSA SERPL-MCNC: 1.4 NG/ML
SODIUM SERPL-SCNC: 136 MMOL/L (ref 136–145)
TRIGL SERPL-MCNC: 66 MG/DL (ref 30–150)

## 2025-08-06 PROCEDURE — 99999 PR PBB SHADOW E&M-EST. PATIENT-LVL IV: CPT | Mod: PBBFAC,,, | Performed by: FAMILY MEDICINE

## 2025-08-06 PROCEDURE — 3008F BODY MASS INDEX DOCD: CPT | Mod: CPTII,S$GLB,, | Performed by: FAMILY MEDICINE

## 2025-08-06 PROCEDURE — 83036 HEMOGLOBIN GLYCOSYLATED A1C: CPT

## 2025-08-06 PROCEDURE — 3078F DIAST BP <80 MM HG: CPT | Mod: CPTII,S$GLB,, | Performed by: FAMILY MEDICINE

## 2025-08-06 PROCEDURE — 84153 ASSAY OF PSA TOTAL: CPT

## 2025-08-06 PROCEDURE — 36415 COLL VENOUS BLD VENIPUNCTURE: CPT | Mod: PO

## 2025-08-06 PROCEDURE — 80061 LIPID PANEL: CPT

## 2025-08-06 PROCEDURE — 80053 COMPREHEN METABOLIC PANEL: CPT

## 2025-08-06 PROCEDURE — 1159F MED LIST DOCD IN RCRD: CPT | Mod: CPTII,S$GLB,, | Performed by: FAMILY MEDICINE

## 2025-08-06 PROCEDURE — 3075F SYST BP GE 130 - 139MM HG: CPT | Mod: CPTII,S$GLB,, | Performed by: FAMILY MEDICINE

## 2025-08-06 PROCEDURE — 99396 PREV VISIT EST AGE 40-64: CPT | Mod: S$GLB,,, | Performed by: FAMILY MEDICINE

## 2025-08-06 RX ORDER — VALSARTAN AND HYDROCHLOROTHIAZIDE 320; 25 MG/1; MG/1
1 TABLET, FILM COATED ORAL DAILY
Qty: 90 TABLET | Refills: 3 | Status: SHIPPED | OUTPATIENT
Start: 2025-08-06 | End: 2026-08-06

## 2025-08-06 RX ORDER — LEVOCETIRIZINE DIHYDROCHLORIDE 5 MG/1
5 TABLET, FILM COATED ORAL NIGHTLY
Qty: 30 TABLET | Refills: 1 | Status: SHIPPED | OUTPATIENT
Start: 2025-08-06 | End: 2026-08-06

## 2025-08-06 RX ORDER — NEBIVOLOL 20 MG/1
1 TABLET ORAL DAILY
Qty: 90 TABLET | Refills: 1 | Status: SHIPPED | OUTPATIENT
Start: 2025-08-06

## 2025-08-06 NOTE — PROGRESS NOTES
"Subjective:       Patient ID: Robert Munoz is a 60 y.o. male.    Chief Complaint: Annual Exam and Medication Refill (NEEDS REFILL ON METOPROLOL 20 MG)      HPI Comments:     Current Medications[1]      Last seen by me 4 months ago    Has lost 9 lb since last visit.  Says his done so through being active.      Still having a lot of low back pain.  On Suboxone    Does not uses CPAP anymore.      Has been trying to use Chantix off and on to help get off the dip.  He has made some progress but not quit yet    Since cardiology changed his medications, his swelling in his legs has gone down he is much happier.    Review of Systems   Constitutional:  Negative for activity change, appetite change and fever.   HENT:  Negative for sore throat.    Respiratory:  Negative for cough and shortness of breath.    Cardiovascular:  Negative for chest pain.   Gastrointestinal:  Negative for abdominal pain, diarrhea and nausea.   Genitourinary:  Negative for difficulty urinating.   Musculoskeletal:  Negative for arthralgias and myalgias.   Neurological:  Negative for dizziness and headaches.       Objective:      Vitals:    08/06/25 1340   BP: 130/70   Pulse: 81   Temp: 97.9 °F (36.6 °C)   TempSrc: Tympanic   SpO2: 98%   Weight: (!) 159.2 kg (351 lb 1.3 oz)   Height: 5' 11" (1.803 m)   PainSc:   4   PainLoc: Back     Physical Exam  Vitals and nursing note reviewed.   Constitutional:       General: He is not in acute distress.     Appearance: He is well-developed. He is not diaphoretic.   HENT:      Head: Normocephalic.   Neck:      Thyroid: No thyromegaly.   Cardiovascular:      Rate and Rhythm: Normal rate and regular rhythm.      Heart sounds: Normal heart sounds. No murmur heard.  Pulmonary:      Effort: Pulmonary effort is normal.      Breath sounds: Normal breath sounds. No wheezing or rales.   Abdominal:      General: There is no distension.      Palpations: Abdomen is soft.   Musculoskeletal:      Cervical back: Neck supple. "   Lymphadenopathy:      Cervical: No cervical adenopathy.   Skin:     General: Skin is warm and dry.   Neurological:      Mental Status: He is alert and oriented to person, place, and time.   Psychiatric:         Mood and Affect: Mood normal.         Behavior: Behavior normal.         Thought Content: Thought content normal.         Judgment: Judgment normal.         Assessment:       1. Annual physical exam    2. Opioid use disorder, severe, on maintenance therapy, dependence    3. Primary hypertension    4. Morbid obesity with BMI of 50.0-59.9, adult    5. Prediabetes    6. Sleep apnea, unspecified type    7. Tobacco abuse    8. Encounter for screening for malignant neoplasm of prostate        Plan:   Annual physical exam  Comments:  Next colonoscopy 2031  Orders:  -     Comprehensive Metabolic Panel; Future; Expected date: 08/06/2025  -     Lipid Panel; Future; Expected date: 08/06/2025  -     PSA, Screening; Future; Expected date: 08/06/2025    Opioid use disorder, severe, on maintenance therapy, dependence  Comments:  On Suboxone    Primary hypertension  Comments:  Controlled.  Follow-up 6 months    Morbid obesity with BMI of 50.0-59.9, adult  Comments:  Weight down 9 lb    Prediabetes  Comments:  A1c today  Orders:  -     Hemoglobin A1C; Future; Expected date: 08/06/2025    Sleep apnea, unspecified type  Comments:  Does not use CPAP    Tobacco abuse  Comments:  Dip.  Cutting back which Chantix    Encounter for screening for malignant neoplasm of prostate  Comments:  PSA today  Orders:  -     PSA, Screening; Future; Expected date: 08/06/2025    Other orders  -     levocetirizine (XYZAL) 5 MG tablet; Take 1 tablet (5 mg total) by mouth every evening.  Dispense: 30 tablet; Refill: 1  -     nebivoloL (BYSTOLIC) 20 mg Tab; Take 1 tablet (20 mg total) by mouth Daily.  Dispense: 90 tablet; Refill: 1  -     valsartan-hydrochlorothiazide (DIOVAN-HCT) 320-25 mg per tablet; Take 1 tablet by mouth once daily.  Dispense:  90 tablet; Refill: 3                 [1]   Current Outpatient Medications:     azelastine (ASTELIN) 137 mcg (0.1 %) nasal spray, 1 spray (137 mcg total) by Nasal route 2 (two) times daily., Disp: 30 mL, Rfl: 0    fluticasone propionate (FLONASE) 50 mcg/actuation nasal spray, 2 sprays (100 mcg total) by Each Nostril route once daily., Disp: 48 g, Rfl: 2    mupirocin (BACTROBAN) 2 % ointment, Apply topically once daily., Disp: 30 g, Rfl: 1    SUBOXONE 12-3 mg Film, Place 0.5 Film under the tongue 3 (three) times daily., Disp: , Rfl:     albuterol (PROVENTIL/VENTOLIN HFA) 90 mcg/actuation inhaler, Inhale 2 puffs into the lungs every 4 (four) hours as needed for Wheezing., Disp: 18 g, Rfl: 2    celecoxib (CELEBREX) 200 MG capsule, Take 200 mg by mouth 2 (two) times daily., Disp: , Rfl:     furosemide (LASIX) 40 MG tablet, Take 1 tablet (40 mg total) by mouth daily as needed (for swelling). Take for 3 days, then as needed for swelling, Disp: 30 tablet, Rfl: 11    levocetirizine (XYZAL) 5 MG tablet, Take 1 tablet (5 mg total) by mouth every evening., Disp: 30 tablet, Rfl: 1    methylPREDNISolone (MEDROL DOSEPACK) 4 mg tablet, use as directed (Patient not taking: Reported on 8/6/2025), Disp: 1 each, Rfl: 0    methylPREDNISolone (MEDROL DOSEPACK) 4 mg tablet, use as directed (Patient not taking: Reported on 8/6/2025), Disp: 1 each, Rfl: 0    nebivoloL (BYSTOLIC) 20 mg Tab, Take 1 tablet (20 mg total) by mouth Daily., Disp: 90 tablet, Rfl: 1    valsartan-hydrochlorothiazide (DIOVAN-HCT) 320-25 mg per tablet, Take 1 tablet by mouth once daily., Disp: 90 tablet, Rfl: 3  No current facility-administered medications for this visit.    Facility-Administered Medications Ordered in Other Visits:     ondansetron injection 4 mg, 4 mg, Intravenous, Once PRN, Dony Bean, MD